# Patient Record
Sex: FEMALE | Race: BLACK OR AFRICAN AMERICAN | Employment: OTHER | URBAN - METROPOLITAN AREA
[De-identification: names, ages, dates, MRNs, and addresses within clinical notes are randomized per-mention and may not be internally consistent; named-entity substitution may affect disease eponyms.]

---

## 2017-06-30 ENCOUNTER — APPOINTMENT (OUTPATIENT)
Dept: CT IMAGING | Age: 82
DRG: 064 | End: 2017-06-30
Attending: STUDENT IN AN ORGANIZED HEALTH CARE EDUCATION/TRAINING PROGRAM
Payer: MEDICARE

## 2017-06-30 ENCOUNTER — APPOINTMENT (OUTPATIENT)
Dept: GENERAL RADIOLOGY | Age: 82
DRG: 064 | End: 2017-06-30
Attending: STUDENT IN AN ORGANIZED HEALTH CARE EDUCATION/TRAINING PROGRAM
Payer: MEDICARE

## 2017-06-30 ENCOUNTER — APPOINTMENT (OUTPATIENT)
Dept: MRI IMAGING | Age: 82
DRG: 064 | End: 2017-06-30
Attending: INTERNAL MEDICINE
Payer: MEDICARE

## 2017-06-30 ENCOUNTER — HOSPITAL ENCOUNTER (INPATIENT)
Age: 82
LOS: 11 days | Discharge: NURSING, REHAB OR LONG-TERM CARE | DRG: 064 | End: 2017-07-11
Attending: STUDENT IN AN ORGANIZED HEALTH CARE EDUCATION/TRAINING PROGRAM | Admitting: INTERNAL MEDICINE
Payer: MEDICARE

## 2017-06-30 DIAGNOSIS — R29.898 RIGHT LEG WEAKNESS: ICD-10-CM

## 2017-06-30 DIAGNOSIS — I63.312 CEREBROVASCULAR ACCIDENT (CVA) DUE TO THROMBOSIS OF LEFT MIDDLE CEREBRAL ARTERY (HCC): ICD-10-CM

## 2017-06-30 DIAGNOSIS — I63.9 CEREBROVASCULAR ACCIDENT (CVA), UNSPECIFIED MECHANISM (HCC): Primary | ICD-10-CM

## 2017-06-30 LAB
ALBUMIN SERPL BCP-MCNC: 3.6 G/DL (ref 3.5–5)
ALBUMIN/GLOB SERPL: 1 {RATIO} (ref 1.1–2.2)
ALP SERPL-CCNC: 87 U/L (ref 45–117)
ALT SERPL-CCNC: 62 U/L (ref 12–78)
ANION GAP BLD CALC-SCNC: 8 MMOL/L (ref 5–15)
APPEARANCE UR: CLEAR
APTT PPP: 25.5 SEC (ref 22.1–32.5)
AST SERPL W P-5'-P-CCNC: 76 U/L (ref 15–37)
ATRIAL RATE: 293 BPM
BACTERIA URNS QL MICRO: NEGATIVE /HPF
BASOPHILS # BLD AUTO: 0 K/UL (ref 0–0.1)
BASOPHILS # BLD: 0 % (ref 0–1)
BILIRUB SERPL-MCNC: 0.7 MG/DL (ref 0.2–1)
BILIRUB UR QL: NEGATIVE
BNP SERPL-MCNC: 236 PG/ML (ref 0–100)
BUN SERPL-MCNC: 11 MG/DL (ref 6–20)
BUN/CREAT SERPL: 9 (ref 12–20)
CALCIUM SERPL-MCNC: 9.1 MG/DL (ref 8.5–10.1)
CALCULATED R AXIS, ECG10: 73 DEGREES
CALCULATED T AXIS, ECG11: -57 DEGREES
CHLORIDE SERPL-SCNC: 108 MMOL/L (ref 97–108)
CO2 SERPL-SCNC: 29 MMOL/L (ref 21–32)
COLOR UR: ABNORMAL
CREAT SERPL-MCNC: 1.16 MG/DL (ref 0.55–1.02)
DIAGNOSIS, 93000: NORMAL
DIFFERENTIAL METHOD BLD: ABNORMAL
EOSINOPHIL # BLD: 1.5 K/UL (ref 0–0.4)
EOSINOPHIL NFR BLD: 2 % (ref 0–7)
EPITH CASTS URNS QL MICRO: ABNORMAL /LPF
ERYTHROCYTE [DISTWIDTH] IN BLOOD BY AUTOMATED COUNT: 15.4 % (ref 11.5–14.5)
GLOBULIN SER CALC-MCNC: 3.6 G/DL (ref 2–4)
GLUCOSE BLD STRIP.AUTO-MCNC: 92 MG/DL (ref 65–100)
GLUCOSE SERPL-MCNC: 92 MG/DL (ref 65–100)
GLUCOSE UR STRIP.AUTO-MCNC: NEGATIVE MG/DL
HCT VFR BLD AUTO: 35.7 % (ref 35–47)
HGB BLD-MCNC: 11.4 G/DL (ref 11.5–16)
HGB UR QL STRIP: ABNORMAL
HYALINE CASTS URNS QL MICRO: ABNORMAL /LPF (ref 0–5)
INR BLD: 0.9 (ref 0.9–1.2)
INR PPP: 1.1 (ref 0.9–1.1)
KETONES UR QL STRIP.AUTO: NEGATIVE MG/DL
LACTATE SERPL-SCNC: 0.8 MMOL/L (ref 0.4–2)
LEUKOCYTE ESTERASE UR QL STRIP.AUTO: NEGATIVE
LYMPHOCYTES # BLD AUTO: 94 % (ref 12–49)
LYMPHOCYTES # BLD: 70.7 K/UL (ref 0.8–3.5)
MCH RBC QN AUTO: 29.2 PG (ref 26–34)
MCHC RBC AUTO-ENTMCNC: 31.9 G/DL (ref 30–36.5)
MCV RBC AUTO: 91.5 FL (ref 80–99)
MONOCYTES # BLD: 0.8 K/UL (ref 0–1)
MONOCYTES NFR BLD AUTO: 1 % (ref 5–13)
NEUTS SEG # BLD: 2.3 K/UL (ref 1.8–8)
NEUTS SEG NFR BLD AUTO: 3 % (ref 32–75)
NITRITE UR QL STRIP.AUTO: NEGATIVE
PATH REV BLD -IMP: ABNORMAL
PH UR STRIP: 7.5 [PH] (ref 5–8)
PLATELET # BLD AUTO: 174 K/UL (ref 150–400)
PLATELET COMMENTS,PCOM: ABNORMAL
POTASSIUM SERPL-SCNC: 3.1 MMOL/L (ref 3.5–5.1)
PROT SERPL-MCNC: 7.2 G/DL (ref 6.4–8.2)
PROT UR STRIP-MCNC: NEGATIVE MG/DL
PROTHROMBIN TIME: 10.9 SEC (ref 9–11.1)
Q-T INTERVAL, ECG07: 328 MS
QRS DURATION, ECG06: 86 MS
QTC CALCULATION (BEZET), ECG08: 489 MS
RBC # BLD AUTO: 3.9 M/UL (ref 3.8–5.2)
RBC #/AREA URNS HPF: ABNORMAL /HPF (ref 0–5)
RBC MORPH BLD: ABNORMAL
SERVICE CMNT-IMP: NORMAL
SODIUM SERPL-SCNC: 145 MMOL/L (ref 136–145)
SP GR UR REFRACTOMETRY: 1.01 (ref 1–1.03)
THERAPEUTIC RANGE,PTTT: NORMAL SECS (ref 58–77)
TROPONIN I SERPL-MCNC: <0.04 NG/ML
TSH SERPL DL<=0.05 MIU/L-ACNC: 1.35 UIU/ML (ref 0.36–3.74)
UROBILINOGEN UR QL STRIP.AUTO: 0.2 EU/DL (ref 0.2–1)
VENTRICULAR RATE, ECG03: 134 BPM
WBC # BLD AUTO: 75.3 K/UL (ref 3.6–11)
WBC MORPH BLD: ABNORMAL
WBC URNS QL MICRO: ABNORMAL /HPF (ref 0–4)

## 2017-06-30 PROCEDURE — G8997 SWALLOW GOAL STATUS: HCPCS | Performed by: PHYSICAL MEDICINE & REHABILITATION

## 2017-06-30 PROCEDURE — 74011000258 HC RX REV CODE- 258: Performed by: STUDENT IN AN ORGANIZED HEALTH CARE EDUCATION/TRAINING PROGRAM

## 2017-06-30 PROCEDURE — 74011250636 HC RX REV CODE- 250/636: Performed by: STUDENT IN AN ORGANIZED HEALTH CARE EDUCATION/TRAINING PROGRAM

## 2017-06-30 PROCEDURE — G8998 SWALLOW D/C STATUS: HCPCS | Performed by: PHYSICAL MEDICINE & REHABILITATION

## 2017-06-30 PROCEDURE — 70450 CT HEAD/BRAIN W/O DYE: CPT

## 2017-06-30 PROCEDURE — 74011636320 HC RX REV CODE- 636/320: Performed by: STUDENT IN AN ORGANIZED HEALTH CARE EDUCATION/TRAINING PROGRAM

## 2017-06-30 PROCEDURE — 85610 PROTHROMBIN TIME: CPT | Performed by: STUDENT IN AN ORGANIZED HEALTH CARE EDUCATION/TRAINING PROGRAM

## 2017-06-30 PROCEDURE — 84484 ASSAY OF TROPONIN QUANT: CPT | Performed by: STUDENT IN AN ORGANIZED HEALTH CARE EDUCATION/TRAINING PROGRAM

## 2017-06-30 PROCEDURE — 70551 MRI BRAIN STEM W/O DYE: CPT

## 2017-06-30 PROCEDURE — 93306 TTE W/DOPPLER COMPLETE: CPT

## 2017-06-30 PROCEDURE — 70496 CT ANGIOGRAPHY HEAD: CPT

## 2017-06-30 PROCEDURE — 74011250637 HC RX REV CODE- 250/637: Performed by: INTERNAL MEDICINE

## 2017-06-30 PROCEDURE — 85730 THROMBOPLASTIN TIME PARTIAL: CPT | Performed by: STUDENT IN AN ORGANIZED HEALTH CARE EDUCATION/TRAINING PROGRAM

## 2017-06-30 PROCEDURE — 84443 ASSAY THYROID STIM HORMONE: CPT | Performed by: INTERNAL MEDICINE

## 2017-06-30 PROCEDURE — 65660000000 HC RM CCU STEPDOWN

## 2017-06-30 PROCEDURE — 93005 ELECTROCARDIOGRAM TRACING: CPT

## 2017-06-30 PROCEDURE — 77030011943

## 2017-06-30 PROCEDURE — 85025 COMPLETE CBC W/AUTO DIFF WBC: CPT | Performed by: STUDENT IN AN ORGANIZED HEALTH CARE EDUCATION/TRAINING PROGRAM

## 2017-06-30 PROCEDURE — 74011250636 HC RX REV CODE- 250/636: Performed by: INTERNAL MEDICINE

## 2017-06-30 PROCEDURE — 83880 ASSAY OF NATRIURETIC PEPTIDE: CPT | Performed by: STUDENT IN AN ORGANIZED HEALTH CARE EDUCATION/TRAINING PROGRAM

## 2017-06-30 PROCEDURE — G8996 SWALLOW CURRENT STATUS: HCPCS | Performed by: PHYSICAL MEDICINE & REHABILITATION

## 2017-06-30 PROCEDURE — 83605 ASSAY OF LACTIC ACID: CPT | Performed by: STUDENT IN AN ORGANIZED HEALTH CARE EDUCATION/TRAINING PROGRAM

## 2017-06-30 PROCEDURE — 92610 EVALUATE SWALLOWING FUNCTION: CPT | Performed by: PHYSICAL MEDICINE & REHABILITATION

## 2017-06-30 PROCEDURE — 99285 EMERGENCY DEPT VISIT HI MDM: CPT

## 2017-06-30 PROCEDURE — 80053 COMPREHEN METABOLIC PANEL: CPT | Performed by: STUDENT IN AN ORGANIZED HEALTH CARE EDUCATION/TRAINING PROGRAM

## 2017-06-30 PROCEDURE — 82962 GLUCOSE BLOOD TEST: CPT

## 2017-06-30 PROCEDURE — 71010 XR CHEST PORT: CPT

## 2017-06-30 PROCEDURE — 36415 COLL VENOUS BLD VENIPUNCTURE: CPT | Performed by: STUDENT IN AN ORGANIZED HEALTH CARE EDUCATION/TRAINING PROGRAM

## 2017-06-30 PROCEDURE — 74011250637 HC RX REV CODE- 250/637: Performed by: STUDENT IN AN ORGANIZED HEALTH CARE EDUCATION/TRAINING PROGRAM

## 2017-06-30 PROCEDURE — 81001 URINALYSIS AUTO W/SCOPE: CPT | Performed by: STUDENT IN AN ORGANIZED HEALTH CARE EDUCATION/TRAINING PROGRAM

## 2017-06-30 PROCEDURE — 85610 PROTHROMBIN TIME: CPT

## 2017-06-30 RX ORDER — POTASSIUM CHLORIDE 750 MG/1
10 TABLET, FILM COATED, EXTENDED RELEASE ORAL DAILY
COMMUNITY
End: 2017-07-11

## 2017-06-30 RX ORDER — ACETAMINOPHEN 650 MG/1
650 SUPPOSITORY RECTAL
Status: DISCONTINUED | OUTPATIENT
Start: 2017-06-30 | End: 2017-07-11 | Stop reason: HOSPADM

## 2017-06-30 RX ORDER — SODIUM CHLORIDE 0.9 % (FLUSH) 0.9 %
5-10 SYRINGE (ML) INJECTION AS NEEDED
Status: DISCONTINUED | OUTPATIENT
Start: 2017-06-30 | End: 2017-07-11 | Stop reason: HOSPADM

## 2017-06-30 RX ORDER — ATORVASTATIN CALCIUM 20 MG/1
20 TABLET, FILM COATED ORAL DAILY
Status: DISCONTINUED | OUTPATIENT
Start: 2017-06-30 | End: 2017-07-11 | Stop reason: HOSPADM

## 2017-06-30 RX ORDER — DIGOXIN 0.25 MG/ML
250 INJECTION INTRAMUSCULAR; INTRAVENOUS
Status: COMPLETED | OUTPATIENT
Start: 2017-06-30 | End: 2017-06-30

## 2017-06-30 RX ORDER — ASPIRIN 81 MG/1
81 TABLET ORAL DAILY
COMMUNITY
End: 2017-07-11

## 2017-06-30 RX ORDER — SODIUM CHLORIDE 0.9 % (FLUSH) 0.9 %
5-10 SYRINGE (ML) INJECTION EVERY 8 HOURS
Status: DISCONTINUED | OUTPATIENT
Start: 2017-06-30 | End: 2017-07-11 | Stop reason: HOSPADM

## 2017-06-30 RX ORDER — METOPROLOL TARTRATE 50 MG/1
50 TABLET ORAL
Status: COMPLETED | OUTPATIENT
Start: 2017-06-30 | End: 2017-06-30

## 2017-06-30 RX ORDER — CLOPIDOGREL BISULFATE 75 MG/1
75 TABLET ORAL DAILY
Status: DISCONTINUED | OUTPATIENT
Start: 2017-07-01 | End: 2017-07-01

## 2017-06-30 RX ORDER — HEPARIN SODIUM 5000 [USP'U]/ML
5000 INJECTION, SOLUTION INTRAVENOUS; SUBCUTANEOUS EVERY 8 HOURS
Status: DISCONTINUED | OUTPATIENT
Start: 2017-06-30 | End: 2017-07-11 | Stop reason: HOSPADM

## 2017-06-30 RX ORDER — SODIUM CHLORIDE 0.9 % (FLUSH) 0.9 %
10 SYRINGE (ML) INJECTION
Status: COMPLETED | OUTPATIENT
Start: 2017-06-30 | End: 2017-06-30

## 2017-06-30 RX ORDER — HYDRALAZINE HYDROCHLORIDE 10 MG/1
10 TABLET, FILM COATED ORAL 2 TIMES DAILY
Status: DISCONTINUED | OUTPATIENT
Start: 2017-07-01 | End: 2017-07-01

## 2017-06-30 RX ORDER — DIGOXIN 125 MCG
0.12 TABLET ORAL DAILY
Status: DISCONTINUED | OUTPATIENT
Start: 2017-07-01 | End: 2017-07-03

## 2017-06-30 RX ORDER — SODIUM CHLORIDE 9 MG/ML
75 INJECTION, SOLUTION INTRAVENOUS CONTINUOUS
Status: DISPENSED | OUTPATIENT
Start: 2017-06-30 | End: 2017-07-01

## 2017-06-30 RX ORDER — ASPIRIN 325 MG
325 TABLET ORAL ONCE
Status: COMPLETED | OUTPATIENT
Start: 2017-06-30 | End: 2017-06-30

## 2017-06-30 RX ORDER — ASPIRIN 325 MG
325 TABLET ORAL DAILY
Status: DISCONTINUED | OUTPATIENT
Start: 2017-07-01 | End: 2017-06-30

## 2017-06-30 RX ORDER — ACETAMINOPHEN 325 MG/1
650 TABLET ORAL
Status: DISCONTINUED | OUTPATIENT
Start: 2017-06-30 | End: 2017-07-11 | Stop reason: HOSPADM

## 2017-06-30 RX ORDER — SODIUM CHLORIDE 9 MG/ML
500 INJECTION, SOLUTION INTRAVENOUS ONCE
Status: DISCONTINUED | OUTPATIENT
Start: 2017-06-30 | End: 2017-06-30 | Stop reason: SDUPTHER

## 2017-06-30 RX ORDER — METOPROLOL SUCCINATE 50 MG/1
50 TABLET, EXTENDED RELEASE ORAL DAILY
COMMUNITY
End: 2017-10-13

## 2017-06-30 RX ORDER — LORAZEPAM 2 MG/ML
2 INJECTION INTRAMUSCULAR ONCE
Status: COMPLETED | OUTPATIENT
Start: 2017-06-30 | End: 2017-06-30

## 2017-06-30 RX ADMIN — DIGOXIN 250 MCG: 0.25 INJECTION INTRAMUSCULAR; INTRAVENOUS at 22:43

## 2017-06-30 RX ADMIN — Medication 10 ML: at 13:07

## 2017-06-30 RX ADMIN — SODIUM CHLORIDE 100 ML: 900 INJECTION, SOLUTION INTRAVENOUS at 07:16

## 2017-06-30 RX ADMIN — METOPROLOL TARTRATE 50 MG: 50 TABLET ORAL at 08:02

## 2017-06-30 RX ADMIN — IOPAMIDOL 100 ML: 755 INJECTION, SOLUTION INTRAVENOUS at 07:16

## 2017-06-30 RX ADMIN — SODIUM CHLORIDE 500 ML: 900 INJECTION, SOLUTION INTRAVENOUS at 12:31

## 2017-06-30 RX ADMIN — HEPARIN SODIUM 5000 UNITS: 5000 INJECTION, SOLUTION INTRAVENOUS; SUBCUTANEOUS at 12:21

## 2017-06-30 RX ADMIN — HEPARIN SODIUM 5000 UNITS: 5000 INJECTION, SOLUTION INTRAVENOUS; SUBCUTANEOUS at 17:58

## 2017-06-30 RX ADMIN — ATORVASTATIN CALCIUM 20 MG: 20 TABLET, FILM COATED ORAL at 12:21

## 2017-06-30 RX ADMIN — ASPIRIN 325 MG: 325 TABLET ORAL at 08:03

## 2017-06-30 RX ADMIN — LORAZEPAM 2 MG: 2 INJECTION INTRAMUSCULAR; INTRAVENOUS at 20:48

## 2017-06-30 RX ADMIN — Medication 10 ML: at 07:16

## 2017-06-30 RX ADMIN — Medication 10 ML: at 20:48

## 2017-06-30 RX ADMIN — SODIUM CHLORIDE 75 ML/HR: 900 INJECTION, SOLUTION INTRAVENOUS at 18:18

## 2017-06-30 RX ADMIN — SODIUM CHLORIDE 500 ML: 900 INJECTION, SOLUTION INTRAVENOUS at 09:16

## 2017-06-30 RX ADMIN — ACETAMINOPHEN 650 MG: 325 TABLET, FILM COATED ORAL at 18:18

## 2017-06-30 NOTE — PROGRESS NOTES
Occupational Therapy    Order received, chart reviewed. Initially cleared by RN to see pt, she reports though pt HR will spike up to 155 bpm. Medical team is aware and pt is cleared to move as long as that rate is not sustained. Pts BP taken and found to be 150/110 and 162/105. At this time ECHO arrived to complete bedside testing. Spoke with RN who report pt does not have anything on board for BP and will notify medical team. Will defer, follow up as able and appropriate.  Irina Aguilar, OTR/L

## 2017-06-30 NOTE — H&P
[facility] [place] [address]   HISTORY AND PHYSICAL       Name:  Leilani Dejesus   MR#:  [id number]   :  10/20/1932   Account #:  [de-identified]        Date of Adm:  2017       CHIEF COMPLAINT: Right lower extremity weakness. HISTORY OF PRESENT ILLNESS: The patient is an 70-year-old   [facility] [employer] female with past medical history of hypertension,   hypercholesterolemia, chronic lymphocytic leukemia, prior history of   atrial fibrillation, currently in sinus rhythm and not on any   anticoagulation, as well as previous history of stroke, who comes to the   emergency room complaining of right lower extremity weakness x2   days. The patient reports that 2 days ago, she started having some   difficulties with walking because because her right leg felt weak. She   denies any falls. She denies leaning towards that side more than the   other. She denies any upper extremity weakness. She denies any   swelling of the lower extremities. She denies any difficulties with   swallowing or any speech difficulties. The patient's granddaughter and   niece are at the bedside and confirm that there was no obvious facial   droop, drooling, slurred speech or confusion. Patient denies any   recent hospitalization or any recent illness. No fevers. No chest pain. She has been taking all of her medications as prescribed. She used to   have a history of atrial fibrillation and was on Coumadin and followed   that with Dr. Emma Stapleton, but that has normalized to sinus rhythm and   the patient was taken off of her blood thinners about 2 years ago. The patient also complains of some minimal pain in her right lower   abdominal quadrant. She states she has been having some problems   with constipation lately and has had poor appetite, but denies any   change in her bowel habits. There are no other complaints. PAST MEDICAL HISTORY    1. Chronic lymphocytic leukemia.     2. Hypertension. 3. Hypercholesterolemia. 4. Allergic rhinitis. 5. Prior history of atrial fibrillation in 2014. MEDICATIONS ON ADMISSION    1. Metoprolol succinate 50 mg p.o. daily. 2. Aspirin 81 mg p.o. daily. 3. Potassium chloride 10 mEq p.o. daily. 4. Furosemide 40 mg p.o. daily. 5. Atorvastatin 20 mg p.o. daily. ALLERGIES     1. ACE INHIBITORS CAUSES COUGH. 2. AMLODIPINE CAUSES SWELLING. 3. ATENOLOL CAUSES NAUSEA. 4. CHLORTHALIDONE. 5. FELODIPINE. 6. HYZAAR CAUSES HEADACHE. SOCIAL HISTORY: The patient is . She is a former smoker   and used to smoke a 1/4-pack of cigarettes per day for about 50 years. She quit in 2010. She occasionally drinks alcohol, 2-4 drinks per week. She normally lives independently with family care as needed. FAMILY HISTORY: Hypertension in her sister. Daughter with breast   cancer. No family history of coronary artery disease, premature cardiac   death or diabetes. REVIEW OF SYSTEMS   All pertinent findings were noted in the HPI, otherwise a 10-point   review of systems is negative. PHYSICAL EXAMINATION   GENERAL:  The patient is a frail, elderly female in no acute respiratory   distress. VITAL SIGNS: Blood pressure 159/117, pulse 138, temperature 98.2,   respiratory rate 18, O2 saturation 95% on room air. BMI is 21. NEUROLOGIC: The patient is awake, alert and oriented x3,   cooperates with interview and physical examination. She answers all   questions appropriately. She has some trouble remembering the past   history, for instance, she did not know that she had a stroke in the   past, but her niece and granddaughter confirms. Normocephalic and   atraumatic. Extraocular movements are grossly intact. No scleral   icterus. The patient's cranial nerves 2-12 are grossly intact. There is no   facial droop. No drooling. Speech is intact. Throat and oral mucosa are   moist.   NECK: Supple. No JVD or lymphadenopathy.    LUNGS: Clear to auscultation bilaterally anteriorly and posteriorly. No   rales, rhonchi or wheezing. CARDIOVASCULAR: Reveals a regular rhythm and rate. S1, S2. Tachycardia. ABDOMEN: Soft, minimally tender localized in the right lower   quadrant. No rebound tenderness. The abdomen is nondistended. EXTREMITIES: Reveal no peripheral edema. There is a right foot drop   and strength in the right lower extremity is about 2-3/5 and weaker   than the left. Strength is 5/5 in the left lower extremity.  in both   hands is symmetrical and normal.   SKIN: Warm, dry and reveals no rashes. DIAGNOSTIC FINDINGS: CBC shows a white blood cell count of 75.3. Her last white blood cell count back in 2014 was 53.6. Hemoglobin is   11.4, hematocrit of 35.7, platelets 396 with 05% lymphocytes. CHEMISTRY: Sodium 145, potassium 3.1, chloride 108, CO2 29, BUN   11, creatinine 1.16, calcium 9.1. Liver function tests within normal   range. Troponin is less than 0.04. BNP is 236. Urinalysis shows clear yellow urine. No bacteria. INR is 1.1. Chest x-ray shows no acute cardiopulmonary abnormality, mild   cardiomegaly. No consolidation or congestion. CT scan of the head shows no acute intracranial hemorrhage,   progressive microvascular ischemic disease in the periventricular white   matter. Stable chronic posterior right frontal infarct, age indeterminate,   but likely chronic right cerebellar infarct. CTA of head and neck shows left middle cerebral artery bifurcation   atherosclerosis with at least mild stenosis of M2. No hemodynamically   significant stenosis, possible acute left posterior basal ganglia/internal   capsule lacunar infarct, bilateral neck adenopathy. EKG show atrial flutter with variable AV block. Heart rate was 134. ASSESSMENT/PLAN    1. Acute cerebrovascular accident resulting in right lower extremity   weakness. The patient will be admitted to the neurology floor.  I will   continue the aspirin, increase the dose to 325 mg. Continue statin at   current dose, considering patient's age. She is not a candidate for high-  intensity statin. I will request Physical Therapy and Occupational   Therapy consults. Permissive hypertension. Neurology consult for   additional recommendations. 2. Hypertension. At this point, the patient's blood pressure is within   acceptable range in the setting of acute cerebrovascular accident. Continue to monitor closely. 3. Tachycardia. The patient's heart rate is markedly elevated. I have   administered a bolus of 500 mL of normal saline in the emergency   room. Continue to evaluate. The patient will be admitted to telemetry. I   will request Cardiology consult by Dr. Coleen Londono. Of note, the patient   received her a.m. dose of metoprolol this morning in the emergency   room, which transiently dropped her blood pressure and transiently   worsened her neurological deficits. Caution with any rate controlling   medications, therefore, is warranted. 4. Chronic lymphocytic leukemia with a markedly elevated white blood   cell count. I reviewed the chart and this is higher than her baseline. The patient may need a Hematology/Oncology consult. In   the meantime, continue to hydrate the patient with normal saline. 5. Minimal right lower quadrant abdominal pain, most likely due to   constipation. I will place the patient on stool softeners. 6. Deep vein thrombosis prophylaxis with heparin subcutaneously. On behalf of the hospitalist team, thank you for involving us in the care   of this patient.         MD Radha Ayers / TRISTON   D:  06/30/2017   09:43   T:  06/30/2017   18:01   Job #:  [id number]

## 2017-06-30 NOTE — PROGRESS NOTES
Referral received. Reviewed chart and met with pt. Pt states she lives with her niece Rachel De Anda) who works during the day. She also stated she walks to [facility] everyday for lunch, etc. She confirmed her PCP being Dr. Felicita Mckeon and informs me she has an appointment with him on July 17th but could not remember time. With her permission, I contacted listed next of kin Isabella Hardwick [phone]). She confirmed the above information as being correct. Stated pt still drives her car as well. Discussed current situation with need for PT/OT evaluations. With previous level of functioning, it is hopeful she may meet inpt rehab criteria if needed. Reviewed inpt rehab facilities. If recommended, they would like Corey Martins to be consulted. Pt does not have Advanced Medical Directive but Ms. Criselda Iqbal feels if able it would be beneficial for pt to complete. Ms Criselda Iqbal expressed desire to be the designated appointee. Financial situation notview discussed at this interview. CRM to follow and assist with disposition plan. Kirk Qiu LCSW    Care Management Interventions  PCP Verified by CM: Yes (Dr. Javier Vaca)  Mode of Transport at Discharge: Other (see comment)  Transition of Care Consult (CM Consult):  Other (? inpt rehab)  MyChart Signup: No  Discharge Durable Medical Equipment: No  Physical Therapy Consult: Yes  Occupational Therapy Consult: Yes  Speech Therapy Consult: Yes  Current Support Network: Relative's Home (lives with es De Anda))  Confirm Follow Up Transport: Family  Plan discussed with Pt/Family/Caregiver: Yes  Freedom of Choice Offered: Yes  Discharge Location  Discharge Placement: Rehab hospital/unit acute

## 2017-06-30 NOTE — H&P
History & Physical dictated [id number]    Acute CVA  HTN  ? Atrial Flutter vs sinus tachy      Signed By: Kieran Zheng MD     June 30, 2017

## 2017-06-30 NOTE — PROGRESS NOTES
Primary Nurse Vianey Black and Carisa Polanco, DONALD performed a dual skin assessment on this patient No impairment noted  Geremias score is 15

## 2017-06-30 NOTE — ED NOTES
Gave bedside report regarding, SBAR, MAR, and plan of care to [facility]. Transfered care of patient to RN.

## 2017-06-30 NOTE — CONSULTS
NEUROLOGY  6/30/2017     Consulted by: Red Jacobo MD        Patient ID:  Hallie Pedroza  [id number]  80 y.o.  10/20/1932    Cc: Slurred speech and weakness    HPI    Annia Gunn is a 80-year-old woman who lives with her niece who fell in her bathroom early this morning. She was found on the bathroom floor unable to get up and had weakness of the right leg. She also had slurred speech. Symptoms apparently resolved by the time she got to the hospital.  While in the hospital, symptoms returned and worsened. Her last known normal was late last night. Here in the hospital, she tells me she cannot move her right leg. She denies any problem with vision or mentation. She is very tired because she has not slept. I reviewed the head CT and there is evidence to suggest a left MCA infarct. CTA with atherosclerosis more on the left. According to the patient in the medical record she has been taking aspirin 81 mg daily. Review of Systems   Eyes: Negative for double vision. Musculoskeletal: Positive for falls. Neurological: Positive for speech change and focal weakness. Negative for headaches. All other systems reviewed and are negative. Past Medical History:   Diagnosis Date    A-fib [facility]) 10/31/2014    Allergic rhinitis 1/13/2010    CLL (chronic lymphocytic leukemia) ([address]     HTN (hypertension) 1/13/2010    Hypercholesterolemia      No family history on file. Social History     Social History    Marital status:      Spouse name: N/A    Number of children: N/A    Years of education: N/A     Occupational History    Not on file.      Social History Main Topics    Smoking status: Former Smoker     Packs/day: 0.25     Years: 50.00     Types: Cigarettes     Quit date: 4/1/2010    Smokeless tobacco: Not on file    Alcohol use 1.0 - 2.0 oz/week     2 - 4 drink(s) per week      Comment: recently quit Friday gin    Drug use: No    Sexual activity: Not on file     Other Topics Concern    Not on file     Social History Narrative     Current Facility-Administered Medications   Medication Dose Route Frequency    sodium chloride (NS) flush 5-10 mL  5-10 mL IntraVENous Q8H    sodium chloride (NS) flush 5-10 mL  5-10 mL IntraVENous PRN    acetaminophen (TYLENOL) tablet 650 mg  650 mg Oral Q4H PRN    Or    acetaminophen (TYLENOL) solution 650 mg  650 mg Per NG tube Q4H PRN    Or    acetaminophen (TYLENOL) suppository 650 mg  650 mg Rectal Q4H PRN    [START ON 7/1/2017] aspirin (ASPIRIN) tablet 325 mg  325 mg Oral DAILY    heparin (porcine) injection 5,000 Units  5,000 Units SubCUTAneous Q8H    atorvastatin (LIPITOR) tablet 20 mg  20 mg Oral DAILY    0.9% sodium chloride infusion  75 mL/hr IntraVENous CONTINUOUS     Allergies   Allergen Reactions    Ace Inhibitors Cough    Amlodipine Swelling    Atenolol Nausea Only    Chlorthalidone Other (comments)     Urinary incontinence    Felodipine Swelling    Hyzaar [Losartan-Hydrochlorothiazide] Other (comments)     headache       Visit Vitals    /89 (BP 1 Location: Right arm, BP Patient Position: At rest;Supine)    Pulse 88    Temp 98.7 °F (37.1 °C)    Resp 19    Ht 5' 5\" (1.651 m)  Comment: drivers license    Wt 56.9 kg (129 lb 13.6 oz)    SpO2 100%    Breastfeeding No    BMI 21.61 kg/m2     Physical Exam   Constitutional: She appears well-developed. Very thin elderly woman   Eyes: EOM are normal. Pupils are equal, round, and reactive to light. Cardiovascular: Normal rate. Pulmonary/Chest: Effort normal.   Skin: Skin is warm and dry. Psychiatric: Her speech is slurred. Vitals reviewed. Neurologic Exam     Mental Status   Attention: normal.   Speech: slurred   Level of consciousness: alert ,  drowsy    Cranial Nerves     CN III, IV, VI   Pupils are equal, round, and reactive to light.   Extraocular motions are normal.     CN VII   Right facial weakness: central    CN VIII   Hearing: intact    CN XII   Tongue deviation: none       Right ptosis     Motor Exam   Muscle bulk: decreased  Overall muscle tone: normal  Right arm pronator drift: present       Right leg 1/5     Sensory Exam        Grossly intact to touch     Gait, Coordination, and Reflexes     Gait  Gait: (Deferredweakness)           Lab Results  Component Value Date/Time   WBC 75.3 06/30/2017 06:54 AM   HGB 11.4 06/30/2017 06:54 AM   HCT 35.7 06/30/2017 06:54 AM   PLATELET 745 10/91/0766 06:54 AM   MCV 91.5 06/30/2017 06:54 AM     Lab Results  Component Value Date/Time   Glucose 92 06/30/2017 06:54 AM   Glucose (POC) 92 06/30/2017 06:49 AM   LDL, calculated 117.2 10/25/2014 05:04 AM   Creatinine 1.16 06/30/2017 06:54 AM      Lab Results  Component Value Date/Time   Cholesterol, total 177 10/25/2014 05:04 AM   HDL Cholesterol 49 10/25/2014 05:04 AM   LDL, calculated 117.2 10/25/2014 05:04 AM   Triglyceride 54 10/25/2014 05:04 AM   CHOL/HDL Ratio 3.6 10/25/2014 05:04 AM   Lab Results  Component Value Date/Time   ALT (SGPT) 62 06/30/2017 06:54 AM   AST (SGOT) 76 06/30/2017 06:54 AM   Alk. phosphatase 87 06/30/2017 06:54 AM   Bilirubin, direct 0.1 09/30/2009 12:45 PM   Bilirubin, total 0.7 06/30/2017 06:54 AM   Albumin 3.6 06/30/2017 06:54 AM   Protein, total 7.2 06/30/2017 06:54 AM   INR 1.1 06/30/2017 06:54 AM   Prothrombin time 10.9 06/30/2017 06:54 AM   PLATELET 173 58/17/8426 06:54 AM       Lab Results  Component Value Date/Time   TSH 0.49 10/24/2014 08:33 PM                     CT Results (maximum last 3): Results from [place] encounter on 06/30/17   CTA CODE NEURO HEAD AND NECK W CONT   Narrative EXAM:  CTA CODE NEURO HEAD AND NECK W CONT  INDICATION:  R sided weakness, AMS, patient arrived at the emergency department  via emergency medical service with patient found on floor at 0 5:30 AM with  slurred speech and right arm weakness. Right facial weakness.   TECHNIQUE:   Axial spiral acquired CT angiography was performed from the aortic arch up  through the intracranial vessels. This was performed during intravenous bolus  infusion 100 mL of Isovue 370. Post-processing with  MIP reconstructions as well  as 3 D surface shaded reconstructions  from aortic arch to the skull base. CT  dose reduction was achieved through use of a standardized protocol tailored for  this examination and automatic exposure control for dose modulation. COMPARISON: CT head 6/30/17 and 10/25/14  FINDINGS:  Normal origins of the brachiocephalic arteries from the arch. The right vertebral artery is dominant. Artifact at the origin of the right  vertebral artery which does not demonstrate definitive stenosis. The left  vertebral artery is quite small/hypoplastic and there is a possibility of a  stenosis at its origin. The left vertebral artery ends in a small PICA. The right vertebral artery  supplies the basilar artery. The basilar artery is relatively small on a  developmental basis with primarily fetal origins to both posterior cerebral  arteries. The common carotid arteries are relatively unremarkable up to the bifurcations. There is dense atherosclerotic calcification at both carotid bifurcations. This  is not resulted in a hemodynamically significant stenosis. 0% stenosis by NASCET  criteria. The cervical internal carotid arteries are somewhat tortuous and otherwise  unremarkable. Carotid siphons have atherosclerotic calcification without significant stenosis. Mild irregularity of the distal supraclinoid internal carotid arteries without  significant stenosis. Right middle cerebral artery and branches are unremarkable. Left middle cerebral artery has a mild to perhaps moderate stenosis at the  origin of both anterior and posterior branches. Definite intraluminal filling  defect is not seen within the M1 or proximal into branches. Anterior cerebral arteries are relatively symmetric and unremarkable.   CT brain images demonstrate area of old injury/infarction in the right frontal  operculum and an area in the left parasagittal parietal lobe as well as evidence  of chronic small vessel type ischemic change. Possible new small lacunar infarct in the left posterior basal ganglia/internal  capsule since 2014. There are numerous lymph nodes in the neck and clavicular regions. Impression IMPRESSION:  1. Left middle cerebral artery bifurcation atherosclerosis with at least mild  stenosis of M2.  2. No hemodynamically significant stenosis or intraluminal filling defects. 3. Carotid bifurcation atherosclerotic calcification without hemodynamically  significant stenosis. 4. Possible acute left posterior basal ganglia/internal capsule lacunar infarct. 5. Bilateral neck adenopathy. Correlate clinically. 6. Chronic cervical spondylosis. CT CODE NEURO HEAD WO CONTRAST   Narrative EXAM:  CT code neuro head without contrast    INDICATION: Patient found on floor with slurred speech and right arm weakness. COMPARISON: CT head 10/25/2014. TECHNIQUE: Axial noncontrast head CT from foramen magnum to vertex. Coronal and  sagittal reformatted images were obtained. CT dose reduction was achieved  through use of a standardized protocol tailored for this examination and  automatic exposure control for dose modulation. Adaptive statistical iterative  reconstruction (ASIR) was utilized. FINDINGS:  There is diffuse age-related parenchymal volume loss. The ventricles  and sulci are age-appropriate without hydrocephalus. There is no mass effect or  midline shift. There is no intracranial hemorrhage or extra-axial fluid  collection. Areas of low attenuation in the periventricular white matter  represent progressive microvascular ischemic changes. There is a stable chronic  infarct in the posterior right frontal lobe. There is an age indeterminate but  likely chronic infarct in the right cerebellar hemisphere. The basal cisterns  are patent. The osseous structures are intact.  There is a mucus retention cyst in the left  maxillary sinus. The remaining paranasal sinuses and mastoid air cells are  clear. Impression IMPRESSION:   There is no acute intracranial hemorrhage. Progressive microvascular ischemic  disease in the periventricular white matter. Stable chronic posterior right  frontal infarct. Age indeterminate but likely chronic right cerebellar infarct. MRI Results (maximum last 3): No results found for this or any previous visit. VAS/US/Carotid Doppler Results (maximum last 3): Results from [place] encounter on 10/24/14   DUPLEX LOWER EXT VENOUS BILAT    PET Results (maximum last 3): No results found for this or any previous visit. Assessment and Plan        80year-old woman with right hemiparesis affecting mostly the leg. She has some right ptosis as well. She has atherosclerosis on imaging which is likely the source leading to thrombosis. Recommend complete the stroke workup. MRI brain noncontrast study. Do not need MRA. Change aspirin to Plavix 75 mg monotherapy. Check lipid panel to assess need for statin therapy. Echo. Rehab assessment. Any acute change in exam please obtain a stat noncontrast head CT. We will follow peripherally. Please call with any questions. During this evaluation, we also discussed stroke education to include signs and symptoms of stroke and TIA.        Anika Denis DO  NEUROLOGIST  Diplomate ABPN  6/30/2017

## 2017-06-30 NOTE — PROGRESS NOTES
TRANSFER - IN REPORT:    Verbal report received from DONALD Cagle(name) on Genesis Deng  being received from ED(unit) for routine progression of care      Report consisted of patients Situation, Background, Assessment and   Recommendations(SBAR). Information from the following report(s) SBAR, Kardex, ED Summary, Intake/Output, MAR, Recent Results and Cardiac Rhythm AFib/Flutter was reviewed with the receiving nurse. Opportunity for questions and clarification was provided. Assessment completed upon patients arrival to unit and care assumed.

## 2017-06-30 NOTE — ED PROVIDER NOTES
HPI Comments: 80 y.o. female with past medical history significant for chronic lymphocytic leukemia, a-fib, HTN, hypercholesterolemia, and allergic rhinitis who presents via EMS from home accompanied by her niece and granddaughter with chief complaint of slurred speech. Per pt's niece, pt fell between [id number] [phone] and 0630 in the bathroom where the niece found her laying on the floor. Pt's niece attempted to help the pt get up, but pt was unable to move her right leg. Pt was not speaking, except to say \"I slipped,\" which pt's niece states is abnormal. Pt was normal before going to bed last night at 2000. Pt's cardiologist discontinued her heart medications with the exception of Toprol-XL. Pt does not remember the fall, but remembers going to the bathroom. Per EMS, pt's initial symptoms of slurred speech and right-sided weakness had resolved. Pt was A&Ox4 with equal strength and no complaints on arrival. Pt's BP was 170/120, BS 94 and slightly tachycardic en route. Pt specifically denies pain. There are no other acute medical concerns at this time. Full history, physical exam, and ROS unable to be obtained due to:  History somewhat limited due to patient's condition and complaint. Social hx: former tobacco smoker; uses EtOH occasionally; denies illicit drug use  PCP: Rik Shen MD  Cardiology: Jerome Griffin MD    Note written by Olman Bloom, as dictated by Lauryn Del Valle MD 6:48 AM         The history is provided by the patient and a relative. Past Medical History:   Diagnosis Date    A-fib [facility]) 10/31/2014    Allergic rhinitis 1/13/2010    CLL (chronic lymphocytic [facility]) ([facility]     HTN (hypertension) 1/13/2010    Hypercholesterolemia        No past surgical history on file. No family history on file.     Social History     Social History    Marital status:      Spouse name: N/A    Number of children: N/A    Years of education: N/A     Occupational History    Not on file.     Social History Main Topics    Smoking status: Former Smoker     Packs/day: 0.25     Years: 50.00     Types: Cigarettes     Quit date: 4/1/2010    Smokeless tobacco: Not on file    Alcohol use 1.0 - 2.0 oz/week     2 - 4 drink(s) per week      Comment: recently quit Friday gin    Drug use: No    Sexual activity: Not on file     Other Topics Concern    Not on file     Social History Narrative         ALLERGIES: Ace inhibitors; Amlodipine; Atenolol; Chlorthalidone; Felodipine; and Hyzaar [losartan-hydrochlorothiazide]    Review of Systems   Unable to perform ROS: Acuity of condition       There were no vitals filed for this visit. Physical Exam   Constitutional: She is oriented to person, place, and time. She appears well-developed. No distress. HENT:   Head: Normocephalic and atraumatic. Eyes: Conjunctivae and EOM are normal. Pupils are equal, round, and reactive to light. Neck: Normal range of motion. Neck supple. Cardiovascular: Normal heart sounds. A regularly irregular rhythm present. Tachycardia present. No murmur heard. Pulmonary/Chest: Effort normal and breath sounds normal. No respiratory distress. Abdominal: Soft. Bowel sounds are normal. She exhibits no distension. There is no tenderness. There is no rebound. Musculoskeletal: Normal range of motion. She exhibits no edema. Neurological: She is alert and oriented to person, place, and time. No cranial nerve deficit. She exhibits normal muscle tone. Coordination normal.   Nonfocal   Skin: Skin is warm and dry. No rash noted. Psychiatric: She has a normal mood and affect. Her behavior is normal.   Nursing note and vitals reviewed.   Note written by Olman Cook, as dictated by Lamine Up MD 6:48 AM     MDM  Number of Diagnoses or Management Options  Cerebrovascular accident (CVA), unspecified mechanism [facility]): new and requires workup     Amount and/or Complexity of Data Reviewed  Clinical lab tests: ordered and reviewed  Tests in the radiology section of CPT®: ordered and reviewed  Tests in the medicine section of CPT®: ordered and reviewed  Decide to obtain previous medical records or to obtain history from someone other than the patient: yes  Obtain history from someone other than the patient: yes  Review and summarize past medical records: yes  Discuss the patient with other providers: yes  Independent visualization of images, tracings, or specimens: yes    Risk of Complications, Morbidity, and/or Mortality  Presenting problems: high  Diagnostic procedures: high  Management options: high    Critical Care  Total time providing critical care: 30-74 minutes (Total critical care time spend exclusive of procedures:  40 minutes  )    ED Course       Procedures    CONSULT NOTE:  7:02 AM Hamilton Stanley MD spoke with Dr. Anival Somers, Consult for Tele-Neurology. Discussed available diagnostic tests and clinical findings. She is in agreement with care plans as outlined. Dr. Anival Somers recommends ordering CTA of the head. ED EKG interpretation:  Rhythm: atrial flutter and variable AV block. Rate (approx.): 134; Axis: normal; ST/T wave: non-specific changes. Note written by Olman Bergman, as dictated by Hamilton Stanley MD 7:30 AM    8:12 AM  CTA head and neck: Impression:   1. Possible acute left posterior basal ganglia/internal capsule lacunar infarct. 2. Left middle cerebral artery bifurcation atherosclerosis with at least mild stenosis of M2.   3. No hemodynamically significant stenosis or intraluminal filling defects. 4. Carotid bifurcation atherosclerotic calcification without hemodynamically significant stenosis. 5. Bilateral neck adenopathy. Correlate clinically. 6. Chronic cervical spondylosis. Chest x-ray: Impression: No acute cardiopulmonary abnormality demonstrated    CT head and neck: Impression:  There is no acute intracranial hemorrhage.  Progressive microvascular ischemic disease in the periventricular white matter. Stable chronic posterior right frontal infarct. Age indeterminate but likely chronic right cerebellar infarct. CONSULT NOTE:  8:18 AM Eron Donovan MD spoke with Dr. Timi Marroquin, Consult for Tele-Neurology. Discussed available diagnostic tests and clinical findings on CTA. He is in agreement with care plans as outlined. Dr. Timi Marroquin recommends admitting the patient for further evaluation. Pt is not a TPA candidate. CONSULT NOTE:  8:37 AM Eron Donovan MD spoke with Sanjeev Benavidez MD, Consult for Hospitalist.  Discussed available diagnostic tests and clinical findings. She is in agreement with care plans as outlined. Dr. Jolynn Jay will evaluate and admit the patient. 9:03 AM  Reassessed patient. Pt complaining of feeling \"terrible\". Pt reports 3 days of right flank pain. Pt's /81. Took metoprolol and asa without complications. Unable to lift right upper or right lower extremity. Pt denies change in stools. Will consult tele-neurology. 9:05 AM  Dr. Jolynn Jay at bedside for initial evaluation. CONSULT NOTE:  9:18 AM Eron Donovan MD spoke with Dr. Timi Marroquin, Consult for Tele-Neurology. Discussed available diagnostic tests and clinical findings. He is in agreement with care plans as outlined. Dr. Timi Marroquin will evaluate the patient in the ED via tele-neuro.

## 2017-06-30 NOTE — ED NOTES
Bedside shift change report given to Cherlynn Closs, RN (oncoming nurse) by Griffin Schwarz RN (offgoing nurse). Report included the following information SBAR.

## 2017-06-30 NOTE — ROUTINE PROCESS
TRANSFER - OUT REPORT:    Verbal report given to Lion Vazquez RN(name) on Estrella  being transferred to [facility](unit) for routine progression of care       Report consisted of patients Situation, Background, Assessment and   Recommendations(SBAR). Information from the following report(s) SBAR was reviewed with the receiving nurse. Lines:   Peripheral IV 06/30/17 Left (Active)   Site Assessment Clean, dry, & intact 6/30/2017  7:16 AM   Phlebitis Assessment 0 6/30/2017  7:16 AM   Infiltration Assessment 0 6/30/2017  7:16 AM   Dressing Status Clean, dry, & intact 6/30/2017  7:16 AM       Peripheral IV 06/30/17 Right Antecubital (Active)   Site Assessment Clean, dry, & intact 6/30/2017  7:16 AM   Phlebitis Assessment 0 6/30/2017  7:16 AM   Infiltration Assessment 0 6/30/2017  7:16 AM   Dressing Status Clean, dry, & intact 6/30/2017  7:16 AM        Opportunity for questions and clarification was provided.       Patient transported with:   Monitor

## 2017-06-30 NOTE — PROGRESS NOTES
Admission Medication Reconciliation:    Information obtained from: patient    Significant PMH/Disease States:   Past Medical History:   Diagnosis Date    A-fib ([facility] 10/31/2014    Allergic rhinitis 1/13/2010    CLL (chronic lymphocytic leukemia) ([facility]     HTN (hypertension) 1/13/2010    Hypercholesterolemia        Chief Complaint for this Admission:  Fall, possible CVA    Allergies:  Ace inhibitors; Amlodipine; Atenolol; Chlorthalidone; Felodipine; and Hyzaar [losartan-hydrochlorothiazide]    Prior to Admission Medications:   Prior to Admission Medications   Prescriptions Last Dose Informant Patient Reported? Taking?   aspirin delayed-release 81 mg tablet 6/29/2017 at Unknown time  Yes Yes   Sig: Take 81 mg by mouth daily. atorvastatin (LIPITOR) 20 mg tablet 6/29/2017 at Unknown time  Yes Yes   Sig: Take 20 mg by mouth daily. furosemide (LASIX) 40 mg tablet 6/29/2017 at Unknown time  Yes Yes   Sig: Take 40 mg by mouth daily. metoprolol succinate (TOPROL-XL) 50 mg XL tablet 6/29/2017 at Unknown time  Yes Yes   Sig: Take 50 mg by mouth daily. potassium chloride SR (KLOR-CON 10) 10 mEq tablet 6/29/2017 at Unknown time  Yes Yes   Sig: Take 10 mEq by mouth daily. Facility-Administered Medications: None         Comments/Recommendations: Pt brought pill bottles. Pt cannot provide reliable allergy information.     Added:   ASA DR 81 mg PO every day  KLOR-CON 10 mEq PO every day    Removed:  Amiodarone 200 mg BID  Theapeutic multi-vitamin PO every day  Warfarin 5 mg every day

## 2017-06-30 NOTE — ED NOTES
Report delayed after going in to pt's room to do neuro assessment at 0900. Status change with confusion, increased right sided weakness, aphasia, hypotension, and diaphoresis. Denies CP or SOB. Pt continues in A-Fib but rate decreased to 112 and /81. Dr. Yumiko Romo advised and Dr. Nissa Lowry, tele-neurology re-evaluated patient at 56. Symptoms had resolved by this time and patient back to baseline.

## 2017-06-30 NOTE — ED TRIAGE NOTES
5507: Arrived via ems from home with daughter reporting finding patient on the floor around 0530 with slurred speech, right sided arm weakness. BS 94 en route. Pt hypertensive 173/100 upon arrival.    0650: Blood work started, patient placed on monitor. 6331: Patient in [address].     0711: Patient back to CT room for CTA. Pt HR varying from 100-140's. 0720: Delay in CTA due to patient movement.

## 2017-06-30 NOTE — PROGRESS NOTES
NUTRITION COMPLETE ASSESSMENT    RECOMMENDATIONS:   1. Encourage PO intake at meals and use supplements to give medications   - document % meals consumed in I/O flowsheet  2. Consider trial of appetite stimulant  3. New measured weight on standing scale - weekly weights to follow     Interventions/Plan:   Food/Nutrient Delivery:   (diet liberalized to 2gm na) Commercial supplement (Ensure BID, Boost Pudding)        Nutrition Education:   encouraged supplements at meals  Assessment:   Reason for Assessment: [x]BPA/MST Referral (unsure wt loss)    Diet: Cardiac  Supplements: none  Nutritionally Significant Medications: [x] Reviewed & Includes: NS @ 75ml/hr  Meal Intake: No data found. Pre-Hospitalization:  Usual Appetite: Poor  Diet at Home: regular  Vitamins/Supplements: Yes (Boost)    Current Hospitalization:   Appetite: Poor  PO Ability: Independent Average po intake:Less than 25%  Average supplements intake:        Subjective:  \"I don't know how much I weight but I haven't felt much like eating for about 6 months. \"    Objective:  Pt admitted for acute CVA. PMHx: HTN, hyperlipidemia, CHF, chronic lymphocytic leukemia. Seen by speech and cleared for regular diet but poor appetite. No lunch eaten today. Pt visited today. Temporal wasting observed. She reports decreased appetite for the past 6 months unsure of UBW recently. Minimal recent wt hx available. Wt Readings from Last 10 Encounters:   06/30/17 58.9 kg (129 lb 13.6 oz)   09/21/15 65.5 kg (144 lb 6 oz)   11/05/14 63.7 kg (140 lb 6.9 oz)   10/29/14 67.9 kg (149 lb 11.1 oz)   07/20/14 67.7 kg (149 lb 4 oz)   12/01/10 79.4 kg (175 lb 0.7 oz)   Prepares her own meals at home but vague with dietary recall. Likes: hot cocoa and sweets. Drinking Boost (chocolate) at home occassionaly, prefers over Ensure. Agreeable to Ensure BID for now and willing to try Boost Pudding (940kcal, 46g protein).  Will liberalize diet to just 2gm Na, hi lilly/hi protein to encourage PO intake. Meets Criteria for Chronic Malnutrition   [x] Severe Malnutrition, as evidenced by:   [x] Severe muscle wasting, loss of subcutaneous fat   [x] Nutritional intake of <75% of recommended intake for >1 month   [] Weight loss of  >5% in 1 month, >7.5% in 3 months, >10% in 6 months, >20% in 1 year   [] Severe edema     If intake remains low may consider appetite stimulant if consistent with pt care plan. Predict decreased in intake may be natural aging progression. Please continue to encourage PO intake at meals and use supplements to give medications to optimize intake. Estimated Nutrition Needs:   Kcals/day: 1240 Kcals/day (1240-1340kcal)  Protein:    Fluid: 1500 ml  Based On: [address] Go (x 1.2-1.3)  Weight Used: Actual wt (58.9kg)    Pt expected to meet estimated nutrient needs:  []   Yes     []  No [x] Unable to predict at this time  Nutrition Diagnosis:   1.  Malnutrition (Inadequate protein/energy intake) related to poor appetite as evidenced by decreased intake x 6 months; cachexia w/ temporal wasting    Goals:     Consumption of at least 50% meals and 1-2 supplements/day in 5-7 days; wt maintenance     Monitoring & Evaluation:    - Liquid meal replacement, Total energy intake, Protein intake   - Weight/weight change    Previous Nutrition Goals Met:   N/A  Previous Recommendations:    N/A    Education & Discharge Needs:   [] None Identified   [x] Identified and addressed    [] Participated in care plan, discharge planning, and/or interdisciplinary rounds        Cultural, Synagogue and ethnic food preferences identified: None    Skin Integrity: [x]Intact  []Other  Edema: [x]None []Other  Last BM: PTA  Food Allergies: [x]None []Other  Diet Restrictions: Cultural/Yazidism Preference(s): None     Anthropometrics:    Weight Loss Metrics 6/30/2017 9/21/2015 11/5/2014 10/29/2014 7/20/2014 12/1/2010 12/1/2010   Today's Wt 129 lb 13.6 oz 144 lb 6 oz 140 lb 6.9 oz 149 lb 11.1 oz 149 lb 4 oz 175 lb 0.7 oz 176 lb   BMI 21.61 kg/m2 24.03 kg/m2 27.57 kg/m2 29.23 kg/m2 29.15 kg/m2 30.03 kg/m2 31.18 kg/m2      Weight Source: Bed  Height: 5' 5\" (407.0 cm) (drivers license),    Body mass index is 21.61 kg/(m^2).   IBW : 56.7 kg (125 lb), % IBW (Calculated): 103.88 %   ,      Labs:    Lab Results   Component Value Date/Time    Sodium 145 06/30/2017 06:54 AM    Potassium 3.1 06/30/2017 06:54 AM    Chloride 108 06/30/2017 06:54 AM    CO2 29 06/30/2017 06:54 AM    Glucose 92 06/30/2017 06:54 AM    BUN 11 06/30/2017 06:54 AM    Creatinine 1.16 06/30/2017 06:54 AM    Calcium 9.1 06/30/2017 06:54 AM    Magnesium 2.0 11/02/2014 04:00 AM    Phosphorus 3.3 11/02/2014 04:00 AM    Albumin 3.6 06/30/2017 06:54 AM     Shaun Barbosa RD

## 2017-06-30 NOTE — PROGRESS NOTES
Speech Pathology bedside swallow evaluation/discharge  Patient: Amarilys Degroot (70 y.o. female)  Date: 6/30/2017  Primary Diagnosis: Acute CVA (cerebrovascular accident) [facility])        Precautions:        ASSESSMENT :  Based on the objective data described below, the patient presents with functional oral-pharyngeal swallow. Mastication was thorough/complete although she did take extra time. No overt s/s aspiration noted. Chart also indicated slurred speech that has since resolved. No motor speech deficits were evident at time of swallow evaluation and patient answered simple questions appropriately. Patient denies difficulty with speech. Skilled therapy provided by a speech-language pathologist is not indicated at this time. PLAN :  Recommendations:  Regular diet + thin liquids  Upright and alert for all po intake  Discharge Recommendations: None     SUBJECTIVE:   Patient stated I'm a slow eater. OBJECTIVE:     Past Medical History:   Diagnosis Date    [facility] 10/31/2014    Allergic rhinitis 1/13/2010    [facility]     HTN (hypertension) 1/13/2010    Hypercholesterolemia    No past surgical history on file. Prior Level of Function/Home Situation:      Diet prior to admission: regular  Current Diet:  regular   Cognitive and Communication Status:  Neurologic State: Alert  Orientation Level: Oriented X4  Cognition: Follows commands     Perseveration: No perseveration noted     Oral Assessment:  Oral Assessment  Labial: No impairment  Dentition: Upper & lower dentures  Oral Hygiene: moist oral mucosa  Lingual: No impairment  Velum: No impairment  Mandible: No impairment  P.O. Trials:  Patient Position: upright in bed  Vocal quality prior to P.O.: No impairment  Consistency Presented: Puree; Solid; Thin liquid  How Presented: Self-fed/presented;Spoon;Straw;Successive swallows     Bolus Acceptance: No impairment  Bolus Formation/Control: No impairment     Propulsion: No impairment  Oral Residue: None  Initiation of Swallow: No impairment  Laryngeal Elevation: Functional  Aspiration Signs/Symptoms: None  Pharyngeal Phase Characteristics: No impairment, issues, or problems   Effective Modifications: None          Oral Phase Severity: No impairment  Pharyngeal Phase Severity : No impairment  NOMS:   The NOMS functional outcome measure was used to quantify this patient's level of swallowing impairment. Based on the NOMS, the patient was determined to be at level 6 for swallow function     G Codes: In compliance with CMSs Claims Based Outcome Reporting, the following G-code set was chosen for this patient based the use of the NOMS functional outcome to quantify this patient's level of swallowing impairment. Using the NOMS, the patient was determined to be at level 6 for swallow function which correlates with the CI= 1-19% level of severity. Based on the objective assessment provided within this note, the current, goal, and discharge g-codes are as follows:    Swallow  Swallowing:   Swallow Current Status CI= 1-19%   Swallow Goal Status CI= 1-19%   Swallow D/C Status CI= 1-19%        NOMS Swallowing Levels:  Level 1 (CN): NPO  Level 2 (CM): NPO but takes consistency in therapy  Level 3 (CL): Takes less than 50% of nutrition p.o. and continues with nonoral feedings; and/or safe with mod cues; and/or max diet restriction  Level 4 (CK): Safe swallow but needs mod cues; and/or mod diet restriction; and/or still requires some nonoral feeding/supplements  Level 5 (CJ): Safe swallow with min diet restriction; and/or needs min cues  Level 6 (CI): Independent with p.o.; rare cues; usually self cues; may need to avoid some foods or needs extra time  Level 7 ([address] [place] [address]): Independent for all p.o.  OSMIN. (2003). National Outcomes Measurement System (NOMS): Adult Speech-Language Pathology User's Guide.        Pain:Pain Scale 1: Numeric (0 - 10)  Pain Intensity 1: 0  Pain Location 1: Abdomen  After treatment:   [] Patient left in no apparent distress sitting up in chair  [x] Patient left in no apparent distress in bed  [x] Call bell left within reach  [x] Nursing notified  [] Caregiver present  [] Bed alarm activated    COMMUNICATION/EDUCATION:   The patients plan of care including findings, recommendations, and recommended diet changes were discussed with: Registered Nurse. Patient was educated regarding Her deficit(s) of possible dysphagia as this relates to Her diagnosis of CVA. She demonstrated Good understanding as evidenced by verbalization. [] Posted safety precautions in patient's room. [x] Patient/family have participated as able and agree with findings and recommendations. [] Patient is unable to participate in plan of care at this time.     Thank you for this referral.  BEATRIS Medellin  Time Calculation: 12 mins

## 2017-06-30 NOTE — ED NOTES
Dr. Michail Heimlich, Hospitalist at bedside. Informed of patient's change of status. VORB for 500 cc bolus over 30 minutes.

## 2017-07-01 LAB
ANION GAP BLD CALC-SCNC: 10 MMOL/L (ref 5–15)
BASOPHILS # BLD AUTO: 0 K/UL (ref 0–0.1)
BASOPHILS # BLD: 0 % (ref 0–1)
BUN SERPL-MCNC: 12 MG/DL (ref 6–20)
BUN/CREAT SERPL: 9 (ref 12–20)
CALCIUM SERPL-MCNC: 8.8 MG/DL (ref 8.5–10.1)
CHLORIDE SERPL-SCNC: 101 MMOL/L (ref 97–108)
CHOLEST SERPL-MCNC: 145 MG/DL
CO2 SERPL-SCNC: 21 MMOL/L (ref 21–32)
CREAT SERPL-MCNC: 1.34 MG/DL (ref 0.55–1.02)
DIFFERENTIAL METHOD BLD: ABNORMAL
EOSINOPHIL # BLD: 0 K/UL (ref 0–0.4)
EOSINOPHIL NFR BLD: 0 % (ref 0–7)
ERYTHROCYTE [DISTWIDTH] IN BLOOD BY AUTOMATED COUNT: 15.2 % (ref 11.5–14.5)
GLUCOSE SERPL-MCNC: 101 MG/DL (ref 65–100)
HCT VFR BLD AUTO: 38.6 % (ref 35–47)
HDLC SERPL-MCNC: 60 MG/DL
HDLC SERPL: 2.4 {RATIO} (ref 0–5)
HGB BLD-MCNC: 12.3 G/DL (ref 11.5–16)
LDLC SERPL CALC-MCNC: 72.8 MG/DL (ref 0–100)
LIPID PROFILE,FLP: NORMAL
LYMPHOCYTES # BLD AUTO: 89 % (ref 12–49)
LYMPHOCYTES # BLD: 75.5 K/UL (ref 0.8–3.5)
MAGNESIUM SERPL-MCNC: 2 MG/DL (ref 1.6–2.4)
MCH RBC QN AUTO: 29.6 PG (ref 26–34)
MCHC RBC AUTO-ENTMCNC: 31.9 G/DL (ref 30–36.5)
MCV RBC AUTO: 92.8 FL (ref 80–99)
MONOCYTES # BLD: 1.7 K/UL (ref 0–1)
MONOCYTES NFR BLD AUTO: 2 % (ref 5–13)
NEUTS SEG # BLD: 7.6 K/UL (ref 1.8–8)
NEUTS SEG NFR BLD AUTO: 9 % (ref 32–75)
PHOSPHATE SERPL-MCNC: 3.8 MG/DL (ref 2.6–4.7)
PLATELET # BLD AUTO: 166 K/UL (ref 150–400)
POTASSIUM SERPL-SCNC: 4.8 MMOL/L (ref 3.5–5.1)
RBC # BLD AUTO: 4.16 M/UL (ref 3.8–5.2)
RBC MORPH BLD: ABNORMAL
SODIUM SERPL-SCNC: 132 MMOL/L (ref 136–145)
TRIGL SERPL-MCNC: 61 MG/DL (ref ?–150)
VLDLC SERPL CALC-MCNC: 12.2 MG/DL
WBC # BLD AUTO: 84.8 K/UL (ref 3.6–11)
WBC MORPH BLD: ABNORMAL

## 2017-07-01 PROCEDURE — 65660000000 HC RM CCU STEPDOWN

## 2017-07-01 PROCEDURE — 74011250637 HC RX REV CODE- 250/637: Performed by: INTERNAL MEDICINE

## 2017-07-01 PROCEDURE — 36415 COLL VENOUS BLD VENIPUNCTURE: CPT | Performed by: INTERNAL MEDICINE

## 2017-07-01 PROCEDURE — 83735 ASSAY OF MAGNESIUM: CPT | Performed by: INTERNAL MEDICINE

## 2017-07-01 PROCEDURE — 74011250636 HC RX REV CODE- 250/636: Performed by: INTERNAL MEDICINE

## 2017-07-01 PROCEDURE — 74011250636 HC RX REV CODE- 250/636: Performed by: NURSE PRACTITIONER

## 2017-07-01 PROCEDURE — 85025 COMPLETE CBC W/AUTO DIFF WBC: CPT | Performed by: INTERNAL MEDICINE

## 2017-07-01 PROCEDURE — 80061 LIPID PANEL: CPT | Performed by: INTERNAL MEDICINE

## 2017-07-01 PROCEDURE — 84100 ASSAY OF PHOSPHORUS: CPT | Performed by: INTERNAL MEDICINE

## 2017-07-01 PROCEDURE — 80048 BASIC METABOLIC PNL TOTAL CA: CPT | Performed by: INTERNAL MEDICINE

## 2017-07-01 RX ORDER — METOPROLOL SUCCINATE 25 MG/1
25 TABLET, EXTENDED RELEASE ORAL DAILY
Status: DISCONTINUED | OUTPATIENT
Start: 2017-07-01 | End: 2017-07-02

## 2017-07-01 RX ORDER — HYDRALAZINE HYDROCHLORIDE 20 MG/ML
10 INJECTION INTRAMUSCULAR; INTRAVENOUS
Status: DISCONTINUED | OUTPATIENT
Start: 2017-07-01 | End: 2017-07-11 | Stop reason: HOSPADM

## 2017-07-01 RX ORDER — ASPIRIN 325 MG
325 TABLET ORAL DAILY
Status: DISCONTINUED | OUTPATIENT
Start: 2017-07-02 | End: 2017-07-11 | Stop reason: HOSPADM

## 2017-07-01 RX ADMIN — ATORVASTATIN CALCIUM 20 MG: 20 TABLET, FILM COATED ORAL at 11:12

## 2017-07-01 RX ADMIN — METOPROLOL SUCCINATE 25 MG: 25 TABLET, EXTENDED RELEASE ORAL at 11:12

## 2017-07-01 RX ADMIN — CLOPIDOGREL BISULFATE 75 MG: 75 TABLET ORAL at 11:12

## 2017-07-01 RX ADMIN — HYDRALAZINE HYDROCHLORIDE 10 MG: 20 INJECTION INTRAMUSCULAR; INTRAVENOUS at 01:19

## 2017-07-01 RX ADMIN — Medication 10 ML: at 06:00

## 2017-07-01 RX ADMIN — DIGOXIN 0.12 MG: 125 TABLET ORAL at 11:12

## 2017-07-01 RX ADMIN — HEPARIN SODIUM 5000 UNITS: 5000 INJECTION, SOLUTION INTRAVENOUS; SUBCUTANEOUS at 18:45

## 2017-07-01 RX ADMIN — Medication 10 ML: at 15:10

## 2017-07-01 RX ADMIN — Medication 10 ML: at 22:00

## 2017-07-01 RX ADMIN — HEPARIN SODIUM 5000 UNITS: 5000 INJECTION, SOLUTION INTRAVENOUS; SUBCUTANEOUS at 02:46

## 2017-07-01 RX ADMIN — HEPARIN SODIUM 5000 UNITS: 5000 INJECTION, SOLUTION INTRAVENOUS; SUBCUTANEOUS at 11:12

## 2017-07-01 NOTE — PROGRESS NOTES
Hospitalist Progress Note  Mae Lopez MD  Office: [phone]  Cell:       Date of Service:  2017  NAME:  Anoop Padilla  :  10/20/1932  MRN:  [id number]      Admission Summary:    The patient is an 80-year-old   [facility] American female with past medical history of hypertension,   hypercholesterolemia, chronic lymphocytic leukemia, prior history of   atrial fibrillation,was not on any   anticoagulation, as well as previous history of stroke, who came to the   emergency room complaining of right lower extremity weakness x2   days. The patient reported that 2 days ago, she started having some   difficulties with walking because of right lower extremity weakness.              Interval history / Subjective:    Drowsy has no new complaints     Assessment & Plan:   1. Left MCA infarct. MRI pending. Continue current plan with Statin, ASA, Plavix. 2 Atrial flutter on Beta blocker seen by Cardiology Echo shows   Ejection  fraction was estimated in the range of 40 % to 45 %. There was severe  hypokinesis of the basal-mid anteroseptal and apical septal wall(s). There  was moderate concentric hypertrophy. She will need anticoagulation once [facility] with Neurology. 3. Hypertension: treatment per stroke guidelines SBP > 220 DBP > 120. Discussed with RN  4.  History of CLL          Care Plan discussed with: Nurse  Disposition: Home w/Family and TBD     Hospital Problems  Date Reviewed: 2017          Codes Class Noted POA    * (Principal)Cerebrovascular accident (CVA) due to thrombosis of left middle cerebral artery ([facility] ICD-10-CM: T66.402  ICD-9-CM: 434.01  2017 Yes        CLL (chronic lymphocytic leukemia) ([facility] ICD-10-CM: C91.10  ICD-9-CM: 204.10  Unknown Yes        CHF (congestive heart failure) ([facility] ICD-10-CM: I50.9  ICD-9-CM: 428.0  10/24/2014 Yes        HTN (hypertension) ICD-10-CM: I10  ICD-9-CM: 401.9  2010 Yes        Mixed hyperlipidemia ICD-10-CM: E78.2  ICD-9-CM: 272.2  1/13/2010 Yes                Review of Systems:   A comprehensive review of systems was negative except for that written in the HPI. Vital Signs:    Last 24hrs VS reviewed since prior progress note. Most recent are:  Visit Vitals    BP (!) 153/91 (BP 1 Location: Right arm, BP Patient Position: At rest)    Pulse 99    Temp 97.6 °F (36.4 °C)    Resp 20    Ht 5' 5\" (1.651 m)    Wt 58.5 kg (128 lb 15.5 oz)    SpO2 96%    Breastfeeding No    BMI 21.46 kg/m2         Intake/Output Summary (Last 24 hours) at 07/01/17 1035  Last data filed at 07/01/17 0400   Gross per 24 hour   Intake           1117.5 ml   Output                0 ml   Net           1117.5 ml        Physical Examination:             Constitutional:  No acute distress, cooperative, pleasant    ENT:  Oral mucous moist, oropharynx benign. Neck supple,    Resp:  CTA bilaterally. No wheezing/rhonchi/rales. No accessory muscle use   CV:  Regular rhythm, normal rate, no murmurs, gallops, rubs    GI:  Soft, non distended, non tender. normoactive bowel sounds, no hepatosplenomegaly     Musculoskeletal:  No edema, warm, 2+ pulses throughout    Neurologic:  Moves all extremities. AAOx3, CN II-XII reviewed     Psych:  Good insight, Not anxious nor agitated.        Data Review:    Review and/or order of clinical lab test      Labs:     Recent Labs      07/01/17   0248  06/30/17   0654   WBC  84.8*  75.3*   HGB  12.3  11.4*   HCT  38.6  35.7   PLT  166  174     Recent Labs      07/01/17   0248  06/30/17   0654   NA  132*  145   K  4.8  3.1*   CL  101  108   CO2  21  29   BUN  12  11   CREA  1.34*  1.16*   GLU  101*  92   CA  8.8  9.1   MG  2.0   --    PHOS  3.8   --      Recent Labs      06/30/17   0654   SGOT  76*   ALT  62   AP  87   TBILI  0.7   TP  7.2   ALB  3.6   GLOB  3.6     Recent Labs      06/30/17   0654  06/30/17   0651   INR  1.1  0.9   PTP  10.9   --    APTT  25.5   --       No results for input(s): FE, TIBC, PSAT, FERR in the last 72 hours. No results found for: FOL, RBCF   No results for input(s): PH, PCO2, PO2 in the last 72 hours.   Recent Labs      06/30/17   0654   TROIQ  <0.04     Lab Results   Component Value Date/Time    Cholesterol, total 145 07/01/2017 02:48 AM    HDL Cholesterol 60 07/01/2017 02:48 AM    LDL, calculated 72.8 07/01/2017 02:48 AM    Triglyceride 61 07/01/2017 02:48 AM    CHOL/HDL Ratio 2.4 07/01/2017 02:48 AM     Lab Results   Component Value Date/Time    Glucose (POC) 92 06/30/2017 06:49 AM    Glucose (POC) 90 11/02/2014 12:20 PM     Lab Results   Component Value Date/Time    Color YELLOW/STRAW 06/30/2017 08:28 AM    Appearance CLEAR 06/30/2017 08:28 AM    Specific gravity 1.014 06/30/2017 08:28 AM    pH (UA) 7.5 06/30/2017 08:28 AM    Protein NEGATIVE  06/30/2017 08:28 AM    Glucose NEGATIVE  06/30/2017 08:28 AM    Ketone NEGATIVE  06/30/2017 08:28 AM    Bilirubin NEGATIVE  06/30/2017 08:28 AM    Urobilinogen 0.2 06/30/2017 08:28 AM    Nitrites NEGATIVE  06/30/2017 08:28 AM    Leukocyte Esterase NEGATIVE  06/30/2017 08:28 AM    Epithelial cells FEW 06/30/2017 08:28 AM    Bacteria NEGATIVE  06/30/2017 08:28 AM    WBC 0-4 06/30/2017 08:28 AM    RBC 0-5 06/30/2017 08:28 AM         Medications Reviewed:     Current Facility-Administered Medications   Medication Dose Route Frequency    hydrALAZINE (APRESOLINE) 20 mg/mL injection 10 mg  10 mg IntraVENous Q6H PRN    metoprolol succinate (TOPROL-XL) XL tablet 25 mg  25 mg Oral DAILY    sodium chloride (NS) flush 5-10 mL  5-10 mL IntraVENous Q8H    sodium chloride (NS) flush 5-10 mL  5-10 mL IntraVENous PRN    acetaminophen (TYLENOL) tablet 650 mg  650 mg Oral Q4H PRN    Or    acetaminophen (TYLENOL) solution 650 mg  650 mg Per NG tube Q4H PRN    Or    acetaminophen (TYLENOL) suppository 650 mg  650 mg Rectal Q4H PRN    heparin (porcine) injection 5,000 Units  5,000 Units SubCUTAneous Q8H    atorvastatin (LIPITOR) tablet 20 mg  20 mg Oral DAILY    clopidogrel (PLAVIX) tablet 75 mg  75 mg Oral DAILY    digoxin (LANOXIN) tablet 0.125 mg  0.125 mg Oral DAILY    hydrALAZINE (APRESOLINE) tablet 10 mg  10 mg Oral BID     ______________________________________________________________________  EXPECTED LENGTH OF STAY: - - -  ACTUAL LENGTH OF STAY:          [address] MD

## 2017-07-01 NOTE — PROGRESS NOTES
Bedside shift change report given to DONALD Davila (oncoming nurse) by Loni Plaza (offgoing nurse). Report included the following information SBAR, Kardex, ED Summary, Intake/Output, MAR, Recent Results and Cardiac Rhythm AFib.

## 2017-07-01 NOTE — PROGRESS NOTES
David sent home with Nichole Myers her granddaughter. Bedside and Verbal shift change report given to [place]. Report included the following information SBAR.

## 2017-07-01 NOTE — PROGRESS NOTES
Neurology Progress Note    Patient ID:  Elena Mcbride  [id number]  80 y.o.  10/20/1932    Subjective:      Patient w/o acute complaints. MRI Brain completed and c/w L>R hemispheric punctate infarcts likely embolic. Current Facility-Administered Medications   Medication Dose Route Frequency    hydrALAZINE (APRESOLINE) 20 mg/mL injection 10 mg  10 mg IntraVENous Q6H PRN    metoprolol succinate (TOPROL-XL) XL tablet 25 mg  25 mg Oral DAILY    sodium chloride (NS) flush 5-10 mL  5-10 mL IntraVENous Q8H    sodium chloride (NS) flush 5-10 mL  5-10 mL IntraVENous PRN    acetaminophen (TYLENOL) tablet 650 mg  650 mg Oral Q4H PRN    Or    acetaminophen (TYLENOL) solution 650 mg  650 mg Per NG tube Q4H PRN    Or    acetaminophen (TYLENOL) suppository 650 mg  650 mg Rectal Q4H PRN    heparin (porcine) injection 5,000 Units  5,000 Units SubCUTAneous Q8H    atorvastatin (LIPITOR) tablet 20 mg  20 mg Oral DAILY    clopidogrel (PLAVIX) tablet 75 mg  75 mg Oral DAILY    digoxin (LANOXIN) tablet 0.125 mg  0.125 mg Oral DAILY          Objective:     Patient Vitals for the past 8 hrs:   BP Temp Pulse Resp SpO2   07/01/17 1500 (!) 178/95 99.1 °F (37.3 °C) 99 24 97 %   07/01/17 1100 (!) 168/111 98.7 °F (37.1 °C) 96 23 95 %       07/01 0701 - 07/01 1900  In: 60 [P.O.:60]  Out: -   06/29 1901 - 07/01 0700  In: 1354.5 [P.O.:237;  I.V.:1117.5]  Out: 800 [Urine:800]    Lab Review   Recent Results (from the past 24 hour(s))   TSH 3RD GENERATION    Collection Time: 06/30/17  6:35 PM   Result Value Ref Range    TSH 1.35 0.36 - 3.74 uIU/mL   LIPID PANEL    Collection Time: 07/01/17  2:48 AM   Result Value Ref Range    LIPID PROFILE          Cholesterol, total 145 <200 MG/DL    Triglyceride 61 <150 MG/DL    HDL Cholesterol 60 MG/DL    LDL, calculated 72.8 0 - 100 MG/DL    VLDL, calculated 12.2 MG/DL    CHOL/HDL Ratio 2.4 0 - 5.0     MAGNESIUM    Collection Time: 07/01/17  2:48 AM   Result Value Ref Range    Magnesium 2.0 1.6 - 2.4 mg/dL   PHOSPHORUS    Collection Time: 07/01/17  2:48 AM   Result Value Ref Range    Phosphorus 3.8 2.6 - 4.7 MG/DL   CBC WITH AUTOMATED DIFF    Collection Time: 07/01/17  2:48 AM   Result Value Ref Range    WBC 84.8 (HH) 3.6 - 11.0 K/uL    RBC 4.16 3.80 - 5.20 M/uL    HGB 12.3 11.5 - 16.0 g/dL    HCT 38.6 35.0 - 47.0 %    MCV 92.8 80.0 - 99.0 FL    MCH 29.6 26.0 - 34.0 PG    MCHC 31.9 30.0 - 36.5 g/dL    RDW 15.2 (H) 11.5 - 14.5 %    PLATELET 993 428 - 406 K/uL    NEUTROPHILS 9 (L) 32 - 75 %    LYMPHOCYTES 89 (H) 12 - 49 %    MONOCYTES 2 (L) 5 - 13 %    EOSINOPHILS 0 0 - 7 %    BASOPHILS 0 0 - 1 %    ABS. NEUTROPHILS 7.6 1.8 - 8.0 K/UL    ABS. LYMPHOCYTES 75.5 (H) 0.8 - 3.5 K/UL    ABS. MONOCYTES 1.7 (H) 0.0 - 1.0 K/UL    ABS. EOSINOPHILS 0.0 0.0 - 0.4 K/UL    ABS. BASOPHILS 0.0 0.0 - 0.1 K/UL    DF SMEAR SCANNED      RBC COMMENTS ANISOCYTOSIS  1+        WBC COMMENTS SMUDGE CELLS SEEN     METABOLIC PANEL, BASIC    Collection Time: 07/01/17  2:48 AM   Result Value Ref Range    Sodium 132 (L) 136 - 145 mmol/L    Potassium 4.8 3.5 - 5.1 mmol/L    Chloride 101 97 - 108 mmol/L    CO2 21 21 - 32 mmol/L    Anion gap 10 5 - 15 mmol/L    Glucose 101 (H) 65 - 100 mg/dL    BUN 12 6 - 20 MG/DL    Creatinine 1.34 (H) 0.55 - 1.02 MG/DL    BUN/Creatinine ratio 9 (L) 12 - 20      GFR est AA 46 (L) >60 ml/min/1.73m2    GFR est non-AA 38 (L) >60 ml/min/1.73m2    Calcium 8.8 8.5 - 10.1 MG/DL       Assessment:     Principal Problem:    Cerebrovascular accident (CVA) due to thrombosis of left middle cerebral artery (HCC) (6/30/2017)    Active Problems:    HTN (hypertension) (1/13/2010)      Mixed hyperlipidemia (1/13/2010)      CHF (congestive heart failure) (HCC) (10/24/2014)      CLL (chronic lymphocytic leukemia) (HCC) ()    80year old AAF h/o CLL, HTN, HPL, Aflutter/Afib presenting with RLE weakness. MRI Brain completed and independently reviewed showing b/l L>R hemispheric punctate infarcts most c/w cardioembolic source.   No significant vessel stenosis or occlusion on CTA. There is also concern for possible cerebral amyloid angiopathy given multifocal remote microhemorrhages on imaging. As such, I would be hesitate to initiate [address] and would elect to continue with ASA monotherapy for stroke prevention.     Plan:   ASA 325mg daily along with Lipitor 20mg for stroke prevention  Stroke education  PT/OT/ST eval  Please arrange for Neuro F/U Dr. Marlene Fritz in 4-6 weeks      Signed:  Flores Hartman DO  7/1/2017  4:06 PM

## 2017-07-01 NOTE — CONSULTS
Consult Note    Date of  Admission: 6/30/2017  6:38 AM     Mike Ferrell is a 80 y.o. female admitted for Acute CVA (cerebrovascular accident) ([facility] Consult requested by Nicolás Mercado MD    Assessment  · Paroxysmal atrial flutter with variable AV block: last office visit with me in April 2016, she was in sinus rhythm  · Acute L MCA infarction with RLE weakness  · Hypertension, somewhat labile with transient hypotension after meds were given for rate slowing with transient worsening of her neurologic deficit in the ED  · History of Takotsubo CM in [id number] with no known recurrence  · CLL currently with very high WBC's    Recommendations:  1. Trial of digoxin for rate control which won't have an effect on her BP  2. Should be formally anticoagulated several weeks down the road when her acute CVA has healed ( at the neurologist's direction)  3. Not a candidate for cardioversion  4. Echo to assess LV size and systolic function  Thank you for this referral  Meghann Quinonez MD  Subjective:  Right leg weakness    Patient Active Problem List    Diagnosis Date Noted    Cerebrovascular accident (CVA) due to thrombosis of left middle cerebral artery ([address] 06/30/2017    CLL (chronic lymphocytic leukemia) ([facility]     A-fib ([facility] 10/31/2014    CHF (congestive heart failure) ([address] 10/24/2014    Lower GI bleed 07/18/2014    GIB (gastrointestinal bleeding) 07/18/2014    Calf pain 06/23/2010    HTN (hypertension) 01/13/2010    History of transient ischemic attack (TIA) 01/13/2010    Allergic rhinitis 01/13/2010    Mixed hyperlipidemia 01/13/2010    Smoker 01/13/2010      Past Medical History:   Diagnosis Date    A-fib ([address] 10/31/2014    Allergic rhinitis 1/13/2010    CLL (chronic lymphocytic leukemia) ([facility]     HTN (hypertension) 1/13/2010    Hypercholesterolemia       No past surgical history on file.   Allergies   Allergen Reactions    Ace Inhibitors Cough    Amlodipine Swelling    Atenolol Nausea Only  Chlorthalidone Other (comments)     Urinary incontinence    Felodipine Swelling    Hyzaar [Losartan-Hydrochlorothiazide] Other (comments)     headache             Review of Symptoms:  Constitutional: positive for weight loss  Cardiac: ; pertinent negatives - chest pain, chest pressure/discomfort, dyspnea, palpitations, irregular heart beats, orthopnea, paroxysmal nocturnal dyspnea, exertional chest pressure/discomfort, claudication, lower extremity edema, tachypnea, dyspnea on exertion, dizziness  Respiratory: negative for cough, sputum, hemoptysis, wheezing, dyspnea on exertion or stridor  Gastrointestinal: negative for dysphagia, odynophagia, dyspepsia, reflux symptoms, nausea, vomiting, melena, diarrhea and abdominal pain  Musculoskeletal:positive for muscle weakness  Neurological: positive for weakness  Other systems reviewed and negative except as above. Physical Exam    Visit Vitals    BP (!) 158/107 (BP 1 Location: Left arm, BP Patient Position: At rest;Supine)    Pulse 85    Temp 98.6 °F (37 °C)    Resp 18    Ht 5' 5\" (1.651 m)  Comment: drivers license    Wt 37.3 kg (129 lb 13.6 oz)    SpO2 99%    Breastfeeding No    BMI 21.61 kg/m2     Neck: supple, symmetrical, trachea midline, no adenopathy, thyroid: not enlarged, symmetric, no tenderness/mass/nodules, no carotid bruit and no JVD  Heart: irregularly irregular rhythm, S1, S2 normal  Lungs: clear to auscultation bilaterally, normal percussion bilaterally  Abdomen: soft, non-tender.  Bowel sounds normal. No masses,  no organomegaly  Extremities: extremities normal, atraumatic, no cyanosis or edema  Pulses: 2+ and symmetric  Neurologic: Mental status: Alert, oriented, thought content appropriate  Motor:RLE weakness    Cardiographics    Telemetry: atrial flutter  ECG: atrial flutter with a rapid ventricular response  Echocardiogram: Abnormal, and reviewed by myself: LV EF 40%, giant left atrium, moderate NR MR    Recent radiology, intake/output and wt reviewed    Lab Results  Component Value Date/Time   WBC 75.3 06/30/2017 06:54 AM   HGB 11.4 06/30/2017 06:54 AM   HCT 35.7 06/30/2017 06:54 AM   PLATELET 253 69/55/8116 06:54 AM   MCV 91.5 06/30/2017 06:54 AM   Lab Results  Component Value Date/Time   Cholesterol, total 177 10/25/2014 05:04 AM   HDL Cholesterol 49 10/25/2014 05:04 AM   LDL, calculated 117.2 10/25/2014 05:04 AM   Triglyceride 54 10/25/2014 05:04 AM   CHOL/HDL Ratio 3.6 10/25/2014 05:04 AM   Lab Results  Component Value Date/Time   ALT (SGPT) 62 06/30/2017 06:54 AM   AST (SGOT) 76 06/30/2017 06:54 AM   Alk.  phosphatase 87 06/30/2017 06:54 AM   Bilirubin, direct 0.1 09/30/2009 12:45 PM   Bilirubin, total 0.7 06/30/2017 06:54 AM   Albumin 3.6 06/30/2017 06:54 AM   Protein, total 7.2 06/30/2017 06:54 AM   INR 1.1 06/30/2017 06:54 AM   Prothrombin time 10.9 06/30/2017 06:54 AM   PLATELET 253 07/02/8620 06:54 AM       Lab Results  Component Value Date/Time   GFR est non-AA 45 06/30/2017 06:54 AM   GFR est AA 54 06/30/2017 06:54 AM   Creatinine 1.16 06/30/2017 06:54 AM   BUN 11 06/30/2017 06:54 AM   Sodium 145 06/30/2017 06:54 AM   Potassium 3.1 06/30/2017 06:54 AM   Chloride 108 06/30/2017 06:54 AM   CO2 29 06/30/2017 06:54 AM   Magnesium 2.0 11/02/2014 04:00 AM   Phosphorus 3.3 11/02/2014 04:00 AM   Lab Results  Component Value Date/Time   TSH 1.35 06/30/2017 06:35 PM

## 2017-07-01 NOTE — PROGRESS NOTES
Spiritual Care Assessment/Progress Notes    Thalia Lew [id number]  [id number]    10/20/1932  80 y.o.  female    Patient Telephone Number: [phone] (home)   Sabianist Affiliation: Myrna Crabtree   Language: English   Extended Emergency Contact Information  Primary Emergency Contact: [address] Phone: [phone]  Mobile Phone: [phone]  Relation: Other Relative  Secondary Emergency Contact: [address] Phone: [phone]  Relation: None   Patient Active Problem List    Diagnosis Date Noted    Cerebrovascular accident (CVA) due to thrombosis of left middle cerebral artery ([facility] 06/30/2017    CLL (chronic lymphocytic leukemia) ([facility]     A-fib ([facility] 10/31/2014    CHF (congestive heart failure) ([facility] 10/24/2014    Lower GI bleed 07/18/2014    GIB (gastrointestinal bleeding) 07/18/2014    Calf pain 06/23/2010    HTN (hypertension) 01/13/2010    History of transient ischemic attack (TIA) 01/13/2010    Allergic rhinitis 01/13/2010    Mixed hyperlipidemia 01/13/2010    Smoker 01/13/2010        Date: 7/1/2017       Level of Sabianist/Spiritual Activity:  []         Involved in brett tradition/spiritual practice    []         Not involved in brett tradition/spiritual practice  []         Spiritually oriented    []         Claims no spiritual orientation    []         seeking spiritual identity  []         Feels alienated from Restorationist practice/tradition  []         Feels angry about Restorationist practice/tradition  [x]         Spirituality/Restorationist tradition may be a resource for coping at this time.   []         Not able to assess due to medical condition    Services Provided Today:  []         crisis intervention    []         reading Scriptures  [x]         spiritual assessment    []         prayer  []         empathic listening/emotional support  []         rites and rituals (cite in comments)  []         life review     [] Hinduism support  []         theological development   []         advocacy  []         ethical dialog     []         blessing  []         bereavement support    [x]         support to family  []         anticipatory grief support   [x]         help with AMD  []         spiritual guidance    []         meditation      Spiritual Care Needs  []         Emotional Support  []         Spiritual/Temple Care  []         Loss/Adjustment  []         Advocacy/Referral                /Ethics  [x]         No needs expressed at               this time  []         Other: (note in               comments)  [address]  []         Follow up visits with               pt/family  []         Provide materials  []         Schedule sacraments  []         Contact Community               Clergy  [x]         Follow up as needed  []         Other: (note in               comments)     Provided support to this pt in [facility]. [address] introduced self and explained role in care team as well as purpose of this visit. Pt's granddaughter had questions about the purpose of an AMD.  [address] answered questions but informed pt, per RN assessment, pt was unable to complete AMD at this time. Advised pt of chaplains' availability to offer assistance with completing AMD when ready. [address] assured pt's granddaughter of prayers and concluded by affirming ongoing availability of support.

## 2017-07-01 NOTE — PROGRESS NOTES
2340:pt's BP high 167/100, rechecked /166, Spoke with Tsering Pemberton NP order received for hydralazine IV prn. Bedside and Verbal shift change report given to Gerard Pimentel (oncoming nurse) by Parveen Dennis (offgoing nurse). Report included the following information SBAR, Kardex, ED Summary, MAR and Cardiac Rhythm A flutter.

## 2017-07-01 NOTE — PROGRESS NOTES
Cardiology Progress Note      7/1/2017 9:55 AM    Admit Date: 6/30/2017    Admit Diagnosis: Acute CVA (cerebrovascular accident) [facility])      Subjective:     Mike Ferrell has no new complaints  Somnolent but rousable    Current Facility-Administered Medications   Medication Dose Route Frequency    hydrALAZINE (APRESOLINE) 20 mg/mL injection 10 mg  10 mg IntraVENous Q6H PRN    sodium chloride (NS) flush 5-10 mL  5-10 mL IntraVENous Q8H    sodium chloride (NS) flush 5-10 mL  5-10 mL IntraVENous PRN    acetaminophen (TYLENOL) tablet 650 mg  650 mg Oral Q4H PRN    Or    acetaminophen (TYLENOL) solution 650 mg  650 mg Per NG tube Q4H PRN    Or    acetaminophen (TYLENOL) suppository 650 mg  650 mg Rectal Q4H PRN    heparin (porcine) injection 5,000 Units  5,000 Units SubCUTAneous Q8H    atorvastatin (LIPITOR) tablet 20 mg  20 mg Oral DAILY    clopidogrel (PLAVIX) tablet 75 mg  75 mg Oral DAILY    digoxin (LANOXIN) tablet 0.125 mg  0.125 mg Oral DAILY    hydrALAZINE (APRESOLINE) tablet 10 mg  10 mg Oral BID         Objective:      Physical Exam:  Visit Vitals    BP (!) 153/91 (BP 1 Location: Right arm, BP Patient Position: At rest)    Pulse 99    Temp 97.6 °F (36.4 °C)    Resp 20    Ht 5' 5\" (1.651 m)  Comment: drivers license    Wt 53.4 kg (128 lb 15.5 oz)    SpO2 96%    Breastfeeding No    BMI 21.46 kg/m2     General Appearance:  Well developed, well nourished, somnolent but rousable, and individual in no acute distress. Ears/Nose/Mouth/Throat:   Hearing grossly normal.         Neck: Supple. Chest:   Lungs clear to auscultation bilaterally. Cardiovascular:  Irregularly irregular, S1, S2 normal, no murmur. Abdomen:   Soft, non-tender, bowel sounds are active. Extremities: No edema bilaterally. Skin: Warm and dry.                  Data Review:   Labs:  Recent Results (from the past 24 hour(s))   TSH 3RD GENERATION    Collection Time: 06/30/17  6:35 PM   Result Value Ref Range    TSH 1.35 0.36 - 3.74 uIU/mL   LIPID PANEL    Collection Time: 07/01/17  2:48 AM   Result Value Ref Range    LIPID PROFILE          Cholesterol, total 145 <200 MG/DL    Triglyceride 61 <150 MG/DL    HDL Cholesterol 60 MG/DL    LDL, calculated 72.8 0 - 100 MG/DL    VLDL, calculated 12.2 MG/DL    CHOL/HDL Ratio 2.4 0 - 5.0     MAGNESIUM    Collection Time: 07/01/17  2:48 AM   Result Value Ref Range    Magnesium 2.0 1.6 - 2.4 mg/dL   PHOSPHORUS    Collection Time: 07/01/17  2:48 AM   Result Value Ref Range    Phosphorus 3.8 2.6 - 4.7 MG/DL   CBC WITH AUTOMATED DIFF    Collection Time: 07/01/17  2:48 AM   Result Value Ref Range    WBC 84.8 (HH) 3.6 - 11.0 K/uL    RBC 4.16 3.80 - 5.20 M/uL    HGB 12.3 11.5 - 16.0 g/dL    HCT 38.6 35.0 - 47.0 %    MCV 92.8 80.0 - 99.0 FL    MCH 29.6 26.0 - 34.0 PG    MCHC 31.9 30.0 - 36.5 g/dL    RDW 15.2 (H) 11.5 - 14.5 %    PLATELET 333 346 - 874 K/uL    NEUTROPHILS 9 (L) 32 - 75 %    LYMPHOCYTES 89 (H) 12 - 49 %    MONOCYTES 2 (L) 5 - 13 %    EOSINOPHILS 0 0 - 7 %    BASOPHILS 0 0 - 1 %    ABS. NEUTROPHILS 7.6 1.8 - 8.0 K/UL    ABS. LYMPHOCYTES 75.5 (H) 0.8 - 3.5 K/UL    ABS. MONOCYTES 1.7 (H) 0.0 - 1.0 K/UL    ABS. EOSINOPHILS 0.0 0.0 - 0.4 K/UL    ABS. BASOPHILS 0.0 0.0 - 0.1 K/UL    DF SMEAR SCANNED      RBC COMMENTS ANISOCYTOSIS  1+        WBC COMMENTS SMUDGE CELLS SEEN     METABOLIC PANEL, BASIC    Collection Time: 07/01/17  2:48 AM   Result Value Ref Range    Sodium 132 (L) 136 - 145 mmol/L    Potassium 4.8 3.5 - 5.1 mmol/L    Chloride 101 97 - 108 mmol/L    CO2 21 21 - 32 mmol/L    Anion gap 10 5 - 15 mmol/L    Glucose 101 (H) 65 - 100 mg/dL    BUN 12 6 - 20 MG/DL    Creatinine 1.34 (H) 0.55 - 1.02 MG/DL    BUN/Creatinine ratio 9 (L) 12 - 20      GFR est AA 46 (L) >60 ml/min/1.73m2    GFR est non-AA 38 (L) >60 ml/min/1.73m2    Calcium 8.8 8.5 - 10.1 MG/DL       Telemetry: AFIB, rate controlled    Echo 6/30/17  SUMMARY:  Left ventricle: Systolic function was moderately reduced. Ejection  fraction was estimated in the range of 40 % to 45 %. There was severe  hypokinesis of the basal-mid anteroseptal and apical septal wall(s). There  was moderate concentric hypertrophy. Left atrium: The atrium was dilated. Mitral valve: There was mild regurgitation. Tricuspid valve: There was mild regurgitation. Assessment:     1) L MCA territory stroke    2) Typical atrial flutter    3) Cardiomyopathy    4) HTN    5) CLL    Plan: Add metoprolol 25mg daily  Continue plavix 75mg daily  Should ultimately be anticoagulated ideally, probably in 2 weeks assuming a large sized stroke once MRI results back.   Could begin anticoagulant earlier if stroke small by MRI

## 2017-07-02 PROCEDURE — 74011250637 HC RX REV CODE- 250/637: Performed by: HOSPITALIST

## 2017-07-02 PROCEDURE — 74011250636 HC RX REV CODE- 250/636: Performed by: INTERNAL MEDICINE

## 2017-07-02 PROCEDURE — 74011250637 HC RX REV CODE- 250/637: Performed by: INTERNAL MEDICINE

## 2017-07-02 PROCEDURE — 74011250637 HC RX REV CODE- 250/637: Performed by: PSYCHIATRY & NEUROLOGY

## 2017-07-02 PROCEDURE — 97530 THERAPEUTIC ACTIVITIES: CPT

## 2017-07-02 PROCEDURE — 65660000000 HC RM CCU STEPDOWN

## 2017-07-02 PROCEDURE — 74011250636 HC RX REV CODE- 250/636: Performed by: HOSPITALIST

## 2017-07-02 PROCEDURE — 97161 PT EVAL LOW COMPLEX 20 MIN: CPT

## 2017-07-02 RX ORDER — SODIUM CHLORIDE 9 MG/ML
75 INJECTION, SOLUTION INTRAVENOUS CONTINUOUS
Status: DISCONTINUED | OUTPATIENT
Start: 2017-07-02 | End: 2017-07-06

## 2017-07-02 RX ORDER — METOPROLOL SUCCINATE 50 MG/1
50 TABLET, EXTENDED RELEASE ORAL DAILY
Status: DISCONTINUED | OUTPATIENT
Start: 2017-07-02 | End: 2017-07-11 | Stop reason: HOSPADM

## 2017-07-02 RX ADMIN — DIGOXIN 0.12 MG: 125 TABLET ORAL at 09:29

## 2017-07-02 RX ADMIN — Medication 325 MG: at 09:16

## 2017-07-02 RX ADMIN — Medication 10 ML: at 22:00

## 2017-07-02 RX ADMIN — HEPARIN SODIUM 5000 UNITS: 5000 INJECTION, SOLUTION INTRAVENOUS; SUBCUTANEOUS at 17:35

## 2017-07-02 RX ADMIN — Medication 10 ML: at 15:35

## 2017-07-02 RX ADMIN — SODIUM CHLORIDE 30 ML/HR: 900 INJECTION, SOLUTION INTRAVENOUS at 16:34

## 2017-07-02 RX ADMIN — HEPARIN SODIUM 5000 UNITS: 5000 INJECTION, SOLUTION INTRAVENOUS; SUBCUTANEOUS at 02:11

## 2017-07-02 RX ADMIN — METOPROLOL SUCCINATE 50 MG: 50 TABLET, EXTENDED RELEASE ORAL at 09:16

## 2017-07-02 RX ADMIN — Medication 10 ML: at 06:00

## 2017-07-02 RX ADMIN — ATORVASTATIN CALCIUM 20 MG: 20 TABLET, FILM COATED ORAL at 09:16

## 2017-07-02 RX ADMIN — HEPARIN SODIUM 5000 UNITS: 5000 INJECTION, SOLUTION INTRAVENOUS; SUBCUTANEOUS at 09:16

## 2017-07-02 NOTE — PROGRESS NOTES
Cardiology Progress Note      7/2/2017 9:55 AM    Admit Date: 6/30/2017    Admit Diagnosis: Acute CVA (cerebrovascular accident) [facility])      Subjective:     Claudell Critchley has no new complaints  More alert today  Sitting out in chair    Current Facility-Administered Medications   Medication Dose Route Frequency    metoprolol succinate (TOPROL-XL) XL tablet 50 mg  50 mg Oral DAILY    hydrALAZINE (APRESOLINE) 20 mg/mL injection 10 mg  10 mg IntraVENous Q6H PRN    aspirin (ASPIRIN) tablet 325 mg  325 mg Oral DAILY    sodium chloride (NS) flush 5-10 mL  5-10 mL IntraVENous Q8H    sodium chloride (NS) flush 5-10 mL  5-10 mL IntraVENous PRN    acetaminophen (TYLENOL) tablet 650 mg  650 mg Oral Q4H PRN    Or    acetaminophen (TYLENOL) solution 650 mg  650 mg Per NG tube Q4H PRN    Or    acetaminophen (TYLENOL) suppository 650 mg  650 mg Rectal Q4H PRN    heparin (porcine) injection 5,000 Units  5,000 Units SubCUTAneous Q8H    atorvastatin (LIPITOR) tablet 20 mg  20 mg Oral DAILY    digoxin (LANOXIN) tablet 0.125 mg  0.125 mg Oral DAILY         Objective:      Physical Exam:  Visit Vitals    BP (!) 133/94 (BP 1 Location: Right arm, BP Patient Position: At rest)    Pulse 100    Temp 98.6 °F (37 °C)    Resp 21    Ht 5' 5\" (1.651 m)  Comment: drivers license    Wt 95.1 kg (129 lb 10.1 oz)    SpO2 100%    Breastfeeding No    BMI 21.57 kg/m2     General Appearance:  Well developed, well nourished, alert, and individual in no acute distress. Ears/Nose/Mouth/Throat:   Hearing grossly normal.         Neck: Supple. Chest:   Lungs clear to auscultation bilaterally. Cardiovascular:  Irregularly irregular, S1, S2 normal, no murmur. Abdomen:   Soft, non-tender, bowel sounds are active. Extremities: No edema bilaterally. Skin: Warm and dry. Data Review:   Labs:  No results found for this or any previous visit (from the past 24 hour(s)).     Telemetry: AFIB, rate controlled    Brain MRI  IMPRESSION:  Extensive chronic ischemic changes with small foci of acute cortical and  subcortical infarction left frontoparietal junction near the vertex and  parasagittal posterior right frontal lobe cortex.  Numerous foci of remote  microhemorrhage may indicate underlying vasculopathy such as amyloid angiopathy.       Assessment:     1) L MCA territory stroke  Small sized per MRI    2) Typical atrial flutter    3) Cardiomyopathy    4) HTN    5) CLL    Plan:     Agree with increasing metoprolol further to 50mg  Neurology recommending against anticoagulation and rather favoring single antiplatelet therapy

## 2017-07-02 NOTE — PROGRESS NOTES
Concern for pt not taking in enough water at this time and waiting on speech evaluation. TORB from Dr. Jorge Bautista for 30 ml/hr for 10 hours of normal saline. Pt made NPO except meds per dysphagia protocol until cleared by speech therapy tomorrow morning. Bedside and Verbal shift change report given to [place]. Report included the following information SBAR.

## 2017-07-02 NOTE — PROGRESS NOTES
Problem: Mobility Impaired (Adult and Pediatric)  Goal: *Acute Goals and Plan of Care (Insert Text)  Physical Therapy Goals  Initiated 7/2/2017  1. Patient will move from supine to sit and sit to supine in bed with minimal assistance within 7 day(s). 2. Patient will transfer from bed to chair and chair to bed with moderate assistance using the least restrictive device within 7 day(s). 3. Patient will perform sit to stand with minimal assistance within 7 day(s). 4. Patient will ambulate with moderate assistance for 15 feet with the least restrictive device within 7 day(s). 5. Pt will increase Rice Balance score by 4-5 points to decrease risk of falls within 7 days. PHYSICAL THERAPY EVALUATION- NEURO POPULATION     Patient: Amrit Brice (73 y.o. female)  Date: 7/2/2017  Primary Diagnosis: Acute CVA (cerebrovascular accident) [facility])        Precautions: R LE weakness> UE         ASSESSMENT :  Chart reviewed. Patient cleared to be seen by nursing staff. Pt A & O x 2, responding to commands though delayed. Pt having some word finding difficulties and minimal facial droop noted on the right. Unilateral weakness noted to RLE>RUE, particularly with DF/PF. See below for MMT results. No numbness or tingling reported at this time. No visual deficits noted, and patient tracking well bilat. Pt educated on BE FAST signs and symptoms and to be mindful of any furthur neurological episodes. Discussed principals of motor learning and promoted neuro plasticity principals, focusing on repetition and importance of continued movement during the recovery period. During evaluation, Pt demonstrating good initiation of movement and improved motor planning. Pt supine to sit EOB, mod assist. Pt needing min assist with sitting balance, tending to fall posteriorly and then over compensating with increased forward flexion. Pt sit to stand, mod assist x 1 without device. Initiated weight shifting and attempted stepping.  Pt able to bear weight through RLE but unable to clear the floor or step at this time. Pt stand pivot to bedside chair, max assist x 1. Recommend patient transfer to the strong side (left) and with assist x 2 people with nursing staff. Pt scored 3/56 on the Rice balance test, indicating high fall risk. Plan for next treatment includes forward gait training with chair follow and assist with forward motion of RLE and continued balance re-training. Pt will also benefit from LE exercise program. Call light within reach and chair alarm activated. Nursing notified. Strongly, recommend inpatient rehab at this time. Patient is working towards tolerating 3 hours of therapy each day and continues to be motivated to work with therapy every time. Pt previously ambulatory and independent out in the community. Recommend with nursing patient to complete as able in order to maintain strength, endurance and independence: OOB to chair 3x/day as appropriate with assist x 2. Thank you for your assistance. Patient will benefit from skilled intervention to address the above impairments.   Patients rehabilitation potential is considered to be Good  Factors which may influence rehabilitation potential include:   [ ]           None noted  [ ]           Mental ability/status  [X]           Medical condition  [ ]           Home/family situation and support systems  [X]           Safety awareness  [ ]           Pain tolerance/management  [ ]           Other:        PLAN :  Recommendations and Planned Interventions:  [X]             Bed Mobility Training             [ ]      Neuromuscular Re-Education  [X]             Transfer Training                   [ ]      Orthotic/Prosthetic Training  [X]             Gait Training                         [ ]      Modalities  [X]             Therapeutic Exercises           [ ]      Edema Management/Control  [X]             Therapeutic Activities            [ ]      Patient and Family Training/Education  [ ] Other (comment):  Frequency/Duration: Patient will be followed by physical therapy 6 times a week to address goals. Discharge Recommendations: Inpatient Rehab  Further Equipment Recommendations for Discharge: none       SUBJECTIVE:   Patient stated Everything is just happening at once.       OBJECTIVE DATA SUMMARY:   HISTORY:    Past Medical History:   Diagnosis Date    A-fib ([facility] 10/31/2014    Allergic rhinitis 1/13/2010    CLL (chronic lymphocytic leukemia) ([facility]      HTN (hypertension) 1/13/2010    Hypercholesterolemia     No past surgical history on file. Prior Level of Function/Home Situation: Previously patient independent with mobility without device. Pt driving and ambulating out in the community daily. Pt lives with family. Personal factors and/or comorbidities impacting plan of care:      Home Situation  Home Environment: Private residence  # Steps to Enter: 4  Rails to Enter: Yes  Hand Rails : Bilateral  Wheelchair Ramp: No  One/Two Story Residence: Two story  # of Interior Steps: 12  Interior Rails: Right  Lift Chair Available: No  Living Alone: No  Support Systems: Family member(s)  Patient Expects to be Discharged to[de-identified] Private residence  Current DME Used/Available at Home: None     EXAMINATION/PRESENTATION/DECISION MAKING:   Critical Behavior:  Neurologic State: Drowsy, Eyes open to voice, Lethargic  Orientation Level: Oriented to person, Oriented to place  Cognition: Decreased command following, Decreased attention/concentration, Recognition of people/places     Hearing:   Auditory  Auditory Impairment: None     Strength:    Strength: Grossly decreased, non-functional  RLE Strength  R Hip Flexion: 2+  R Hip ADduction: 3  R Knee Flexion: 3+  R Knee Extension: 3  R Ankle Dorsiflexion: 1  R Ankle Plantar Flexion: 0        LLE Strength  L Hip Flexion: 4  L Hip Extension: 4  L Hip ABduction: 4  L Hip ADduction: 4  L Knee Flexion: 4+  L Knee Extension: 4+  L Ankle Dorsiflexion: 4+  L Ankle Plantar Flexion: 4+  Tone & Sensation:   Tone: Normal     Sensation: Intact     Coordination:  Coordination: Grossly decreased, non-functional  Vision: WFL, good tracking bilat     Functional Mobility:  Bed Mobility:  Supine to Sit: Moderate assistance;Assist x1;Additional time  Transfers:  Sit to Stand: Moderate assistance;Assist x1;Additional time  Stand to Sit: Moderate assistance;Assist x1;Additional time  Stand Pivot Transfers: Maximum assistance     Bed to Chair: Assist x1;Maximum assistance; Additional time (recommend assist x 2 with nursing staff)  Balance:   Sitting: Impaired  Sitting - Static: Fair (occasional)  Sitting - Dynamic: Poor (constant support)  Standing: Impaired  Standing - Static: Poor  Standing - Dynamic : Poor     Functional Measure  Rice Balance Test:      Sitting to Standin  Standing Unsupported: 0  Sitting with Back Unsupported: 2  Standing to Sittin  Transfers: 1  Standing Unsupported with Eyes Closed: 0  Standing Unsupported with Feet Together: 0  Reach Forward with Outstretched Arm: 0   Object: 0  Turn to Look Over Shoulders: 0  Turn 360 Degrees: 0  Alternate Foot on Step/Stool: 0  Standing Unsupported One Foot in Front: 0  Stand on One Le  Total: 3             56=Maximum possible score;   0-20=High fall risk  21-40=Moderate fall risk   41-56=Low fall risk      Rice Balance Test and G-code impairment scale:  Percentage of Impairment CH     0%    CI     1-19% CJ     20-39% CK     40-59% CL     60-79% CM     80-99% CN      100%   Rice   Score 0-56 56 45-55 34-44 23-33 12-22 1-11 0         G codes: In compliance with CMSs Claims Based Outcome Reporting, the following G-code set was chosen for this patient based on their primary functional limitation being treated: The outcome measure chosen to determine the severity of the functional limitation was the Fitch with a score of 3/56 which was correlated with the impairment scale.       · Mobility - Walking and Moving Around:               - CURRENT STATUS:    CM - 80%-99% impaired, limited or restricted               - GOAL STATUS:           CL - 60%-79% impaired, limited or restricted               - D/C STATUS:                       ---------------To be determined---------------         Pain:  Pain Scale 1: Numeric (0 - 10)  Pain Intensity 1: 0     Activity Tolerance:   HR continues to fluctuate from 100-125, rhythm can be erratic with activity  Please refer to the flowsheet for vital signs taken during this treatment. After treatment:   [X]     Patient left in no apparent distress sitting up in chair  [ ]     Patient left in no apparent distress in bed  [X]     Call bell left within reach  [X]     Nursing notified  [ ]     Caregiver present  [X]     Bed alarm activated      COMMUNICATION/EDUCATION:   The patients plan of care was discussed with: Registered Nurse. Patient was educated regarding Her deficit(s) of R sided weakness as this relates to Her diagnosis of CVA. She demonstrated good understanding as evidenced by identifying weakness and functional limitations. Patient and/or family was verbally educated on the BE FAST acronym for signs/symptoms of CVA and TIA. BE FAST was written on patient's communication board  for visual education and reinforcement. All questions answered with patient indicating fair understanding.      [X]  Fall prevention education was provided and the patient/caregiver indicated understanding. [X]  Patient/family have participated as able in goal setting and plan of care. [X]  Patient/family agree to work toward stated goals and plan of care. [ ]  Patient understands intent and goals of therapy, but is neutral about his/her participation. [ ]  Patient is unable to participate in goal setting and plan of care.      Thank you for this referral.  Jaycee Meyer PT, DPT   Time Calculation: 24 mins

## 2017-07-02 NOTE — PROGRESS NOTES
Bedside shift change report given to [employer] (oncoming nurse) by Raj Hill rn (offgoing nurse). Report included the following information SBAR, Kardex, ED Summary, Intake/Output, Med Rec Status and Cardiac Rhythm A flutter .

## 2017-07-02 NOTE — PROGRESS NOTES
Hospitalist Progress Note  Issac Romo MD  Office: [phone]  Cell:       Date of Service:  2017  NAME:  Ana Rosa Vasquez  :  10/20/1932  MRN:  [id number]      Admission Summary:    The patient is an 80-year-old   [facility] female with past medical history of hypertension,   hypercholesterolemia, chronic lymphocytic leukemia, prior history of   atrial fibrillation,was not on any   anticoagulation, as well as previous history of stroke, who came to the   emergency room complaining of right lower extremity weakness x2   days. The patient reported that 2 days ago, she started having some   difficulties with walking because of right lower extremity weakness.              Interval history / Subjective:    Awake has no new complaints. Slightly slow to answer questions. Assessment & Plan:   1.  L > R embolic infarcts with foci of remote microhemmorhage. Continue current plan with Statin, ASA. 2 Atrial flutter on low dose Beta blocker seen by Cardiology increase dose to 50 mg as remains tachycardiac   Echo shows   Ejection  fraction was estimated in the range of 40 % to 45 %. There was severe  hypokinesis of the basal-mid anteroseptal and apical septal wall(s). There  was moderate concentric hypertrophy. She will need anticoagulation once [facility] with Neurology. 3. Hypertension: treatment per stroke guidelines SBP > 220 DBP > 120. Discussed with RN  4. History of CLL  5. Speech and PT consulted. Discussed with daughter she will like to discuss with case management about discharge planning and may need some help. Code Status : Full code but family will discuss with her today.     Care Plan discussed with: Nurse  Disposition: Home w/Family and TBD     Hospital Problems  Date Reviewed: 2017          Codes Class Noted POA    * (Principal)Cerebrovascular accident (CVA) due to thrombosis of left middle cerebral artery ([facility] ICD-10-CM: S85.390  ICD-9-CM: 434.01  6/30/2017 Yes        CLL [facility]chronic lymphocytic leukemia) [facility] ICD-10-CM: C91.10  ICD-9-CM: 204.10  Unknown Yes        CHF (congestive heart failure) (HCC) ICD-10-CM: I50.9  ICD-9-CM: 428.0  10/24/2014 Yes        HTN (hypertension) ICD-10-CM: I10  ICD-9-CM: 401.9  1/13/2010 Yes        Mixed hyperlipidemia ICD-10-CM: S65.6  ICD-9-CM: 272.2  1/13/2010 Yes                Review of Systems:   A comprehensive review of systems was negative except for that written in the HPI. Vital Signs:    Last 24hrs VS reviewed since prior progress note. Most recent are:  Visit Vitals    BP (!) 133/94 (BP 1 Location: Right arm, BP Patient Position: At rest)    Pulse 100    Temp 98.6 °F (37 °C)    Resp 21    Ht 5' 5\" (1.651 m)    Wt 58.8 kg (129 lb 10.1 oz)    SpO2 100%    Breastfeeding No    BMI 21.57 kg/m2         Intake/Output Summary (Last 24 hours) at 07/02/17 7104  Last data filed at 07/01/17 1127   Gross per 24 hour   Intake               60 ml   Output                0 ml   Net               60 ml        Physical Examination:             Constitutional:  No acute distress, cooperative, pleasant    ENT:  Oral mucous moist, oropharynx benign. Neck supple,    Resp:  CTA bilaterally. No wheezing/rhonchi/rales. No accessory muscle use   CV:  Regular rhythm, normal rate, no murmurs, gallops, rubs    GI:  Soft, non distended, non tender. normoactive bowel sounds, no hepatosplenomegaly     Musculoskeletal:  No edema, warm, 2+ pulses throughout    Neurologic:  Moves all extremities. AAOx3, CN II-XII reviewed     Psych:  Good insight, Not anxious nor agitated.        Data Review:    Review and/or order of clinical lab test      Labs:     Recent Labs      07/01/17   0248  06/30/17   0654   WBC  84.8*  75.3*   HGB  12.3  11.4*   HCT  38.6  35.7   PLT  166  174     Recent Labs      07/01/17   0248  06/30/17   0654   NA  132*  145   K  4.8  3.1*   CL  101  108   CO2  21  29   BUN  12  11   CREA  1.34* 1.16*   GLU  101*  92   CA  8.8  9.1   MG  2.0   --    PHOS  3.8   --      Recent Labs      06/30/17   0654   SGOT  76*   ALT  62   AP  87   TBILI  0.7   TP  7.2   ALB  3.6   GLOB  3.6     Recent Labs      06/30/17   0654  06/30/17   0651   INR  1.1  0.9   PTP  10.9   --    APTT  25.5   --       No results for input(s): FE, TIBC, PSAT, FERR in the last 72 hours. No results found for: FOL, RBCF   No results for input(s): PH, PCO2, PO2 in the last 72 hours.   Recent Labs      06/30/17   0654   TROIQ  <0.04     Lab Results   Component Value Date/Time    Cholesterol, total 145 07/01/2017 02:48 AM    HDL Cholesterol 60 07/01/2017 02:48 AM    LDL, calculated 72.8 07/01/2017 02:48 AM    Triglyceride 61 07/01/2017 02:48 AM    CHOL/HDL Ratio 2.4 07/01/2017 02:48 AM     Lab Results   Component Value Date/Time    Glucose (POC) 92 06/30/2017 06:49 AM    Glucose (POC) 90 11/02/2014 12:20 PM     Lab Results   Component Value Date/Time    Color YELLOW/STRAW 06/30/2017 08:28 AM    Appearance CLEAR 06/30/2017 08:28 AM    Specific gravity 1.014 06/30/2017 08:28 AM    pH (UA) 7.5 06/30/2017 08:28 AM    Protein NEGATIVE  06/30/2017 08:28 AM    Glucose NEGATIVE  06/30/2017 08:28 AM    Ketone NEGATIVE  06/30/2017 08:28 AM    Bilirubin NEGATIVE  06/30/2017 08:28 AM    Urobilinogen 0.2 06/30/2017 08:28 AM    Nitrites NEGATIVE  06/30/2017 08:28 AM    Leukocyte Esterase NEGATIVE  06/30/2017 08:28 AM    Epithelial cells FEW 06/30/2017 08:28 AM    Bacteria NEGATIVE  06/30/2017 08:28 AM    WBC 0-4 06/30/2017 08:28 AM    RBC 0-5 06/30/2017 08:28 AM         Medications Reviewed:     Current Facility-Administered Medications   Medication Dose Route Frequency    metoprolol succinate (TOPROL-XL) XL tablet 50 mg  50 mg Oral DAILY    hydrALAZINE (APRESOLINE) 20 mg/mL injection 10 mg  10 mg IntraVENous Q6H PRN    aspirin (ASPIRIN) tablet 325 mg  325 mg Oral DAILY    sodium chloride (NS) flush 5-10 mL  5-10 mL IntraVENous Q8H    sodium chloride (NS) flush 5-10 mL  5-10 mL IntraVENous PRN    acetaminophen (TYLENOL) tablet 650 mg  650 mg Oral Q4H PRN    Or    acetaminophen (TYLENOL) solution 650 mg  650 mg Per NG tube Q4H PRN    Or    acetaminophen (TYLENOL) suppository 650 mg  650 mg Rectal Q4H PRN    heparin (porcine) injection 5,000 Units  5,000 Units SubCUTAneous Q8H    atorvastatin (LIPITOR) tablet 20 mg  20 mg Oral DAILY    digoxin (LANOXIN) tablet 0.125 mg  0.125 mg Oral DAILY     ______________________________________________________________________  EXPECTED LENGTH OF STAY: - - -  ACTUAL LENGTH OF STAY:          2                 Fredy Pruitt MD

## 2017-07-03 LAB
ANION GAP BLD CALC-SCNC: 10 MMOL/L (ref 5–15)
BUN SERPL-MCNC: 36 MG/DL (ref 6–20)
BUN/CREAT SERPL: 19 (ref 12–20)
CALCIUM SERPL-MCNC: 8.6 MG/DL (ref 8.5–10.1)
CHLORIDE SERPL-SCNC: 100 MMOL/L (ref 97–108)
CO2 SERPL-SCNC: 23 MMOL/L (ref 21–32)
CREAT SERPL-MCNC: 1.94 MG/DL (ref 0.55–1.02)
ERYTHROCYTE [DISTWIDTH] IN BLOOD BY AUTOMATED COUNT: 14.8 % (ref 11.5–14.5)
GLUCOSE SERPL-MCNC: 88 MG/DL (ref 65–100)
HCT VFR BLD AUTO: 38.9 % (ref 35–47)
HGB BLD-MCNC: 12.6 G/DL (ref 11.5–16)
MCH RBC QN AUTO: 29 PG (ref 26–34)
MCHC RBC AUTO-ENTMCNC: 32.4 G/DL (ref 30–36.5)
MCV RBC AUTO: 89.6 FL (ref 80–99)
PLATELET # BLD AUTO: 177 K/UL (ref 150–400)
POTASSIUM SERPL-SCNC: 3.7 MMOL/L (ref 3.5–5.1)
RBC # BLD AUTO: 4.34 M/UL (ref 3.8–5.2)
SODIUM SERPL-SCNC: 133 MMOL/L (ref 136–145)
WBC # BLD AUTO: 94.5 K/UL (ref 3.6–11)

## 2017-07-03 PROCEDURE — 74011250637 HC RX REV CODE- 250/637: Performed by: HOSPITALIST

## 2017-07-03 PROCEDURE — 74011250637 HC RX REV CODE- 250/637: Performed by: INTERNAL MEDICINE

## 2017-07-03 PROCEDURE — G8988 SELF CARE GOAL STATUS: HCPCS

## 2017-07-03 PROCEDURE — 74011250637 HC RX REV CODE- 250/637: Performed by: PSYCHIATRY & NEUROLOGY

## 2017-07-03 PROCEDURE — G8987 SELF CARE CURRENT STATUS: HCPCS

## 2017-07-03 PROCEDURE — 97165 OT EVAL LOW COMPLEX 30 MIN: CPT

## 2017-07-03 PROCEDURE — 80048 BASIC METABOLIC PNL TOTAL CA: CPT | Performed by: HOSPITALIST

## 2017-07-03 PROCEDURE — G8997 SWALLOW GOAL STATUS: HCPCS | Performed by: SPEECH-LANGUAGE PATHOLOGIST

## 2017-07-03 PROCEDURE — 85027 COMPLETE CBC AUTOMATED: CPT | Performed by: HOSPITALIST

## 2017-07-03 PROCEDURE — 65660000000 HC RM CCU STEPDOWN

## 2017-07-03 PROCEDURE — 92526 ORAL FUNCTION THERAPY: CPT | Performed by: SPEECH-LANGUAGE PATHOLOGIST

## 2017-07-03 PROCEDURE — G8996 SWALLOW CURRENT STATUS: HCPCS | Performed by: SPEECH-LANGUAGE PATHOLOGIST

## 2017-07-03 PROCEDURE — 97530 THERAPEUTIC ACTIVITIES: CPT

## 2017-07-03 PROCEDURE — 36415 COLL VENOUS BLD VENIPUNCTURE: CPT | Performed by: HOSPITALIST

## 2017-07-03 PROCEDURE — 74011250636 HC RX REV CODE- 250/636: Performed by: INTERNAL MEDICINE

## 2017-07-03 RX ADMIN — Medication 10 ML: at 06:00

## 2017-07-03 RX ADMIN — DIGOXIN 0.12 MG: 125 TABLET ORAL at 08:47

## 2017-07-03 RX ADMIN — Medication 325 MG: at 08:47

## 2017-07-03 RX ADMIN — METOPROLOL SUCCINATE 50 MG: 50 TABLET, EXTENDED RELEASE ORAL at 08:47

## 2017-07-03 RX ADMIN — ATORVASTATIN CALCIUM 20 MG: 20 TABLET, FILM COATED ORAL at 08:47

## 2017-07-03 RX ADMIN — Medication 10 ML: at 18:39

## 2017-07-03 RX ADMIN — Medication 10 ML: at 22:14

## 2017-07-03 RX ADMIN — HEPARIN SODIUM 5000 UNITS: 5000 INJECTION, SOLUTION INTRAVENOUS; SUBCUTANEOUS at 18:39

## 2017-07-03 RX ADMIN — HEPARIN SODIUM 5000 UNITS: 5000 INJECTION, SOLUTION INTRAVENOUS; SUBCUTANEOUS at 02:52

## 2017-07-03 RX ADMIN — HEPARIN SODIUM 5000 UNITS: 5000 INJECTION, SOLUTION INTRAVENOUS; SUBCUTANEOUS at 12:15

## 2017-07-03 NOTE — PROGRESS NOTES
Problem: Ischemic Stroke: Day 2  Goal: Respiratory  Outcome: Progressing Towards Goal  Pt is 94-99% on RA lower lungs are coarse

## 2017-07-03 NOTE — PROGRESS NOTES
Care Management Interventions  PCP Verified by CM: Yes  Last Visit to PCP: 06/02/17  Palliative Care Consult (Criteria: CHF and RRAT>21): No  Reason for No Palliative Care Consult: Patient declined palliative services at this time  Mode of Transport at Discharge: S  Transition of Care Consult (CM Consult): Other (Inpatient/ Acute Rehab Facility)  MyChart Signup: No  Discharge Durable Medical Equipment: No  Physical Therapy Consult: Yes  Occupational Therapy Consult: Yes  Speech Therapy Consult: Yes  Current Support Network: Own Home  Confirm Follow Up Transport:  (Patient is expected to need ambulance transport.)  Plan discussed with Pt/Family/Caregiver: Yes  Freedom of Choice Offered: Yes  Discharge Location  Discharge Placement: Rehab hospital/unit acute     Met with patient's grand-dtr Ary Weaver - [phone]) at the bedside to discuss next steps and coordination of post acute care needs. Patient has been recommended for In-Patient Rehab r/t CVA. [place] of Choice letter signed and explained. Copy placed on the chart. Permission received to send clinical information to [address] [place] [address]. Will send referral via cube19. Patient has a Medicare Replacement Plan with [employer] and will require authorization prior to admission to [facility]. Ms. Cordelia Sharif is expected to need ambulance transport at time of d/c.    Education provided on Advanced Directives and POA designation. Will continue to assist with a seamless and safe transition to next level of care.     Anisha Huerta RN

## 2017-07-03 NOTE — PROGRESS NOTES
Problem: Self Care Deficits Care Plan (Adult)  Goal: *Acute Goals and Plan of Care (Insert Text)  Occupational Therapy Goals  Initiated 7/3/2017  1. Patient will perform upper body dressing with minimal assistance/contact guard assist within 7 day(s). 2. Patient will perform lower body dressing with moderate assistance within 7 day(s). 3. Patient will perform bathing with moderate assistance within 7 day(s). 4. Patient will perform toilet transfers with moderate assistance within 7 day(s). 5. Patient will perform all aspects of toileting with moderate assistance within 7 day(s). OCCUPATIONAL THERAPY EVALUATION  Patient: Laura Tate (24 y.o. female)  Date: 7/3/2017  Primary Diagnosis: Acute CVA (cerebrovascular accident) [facility])        Precautions: Seizure,  Fall, sbp<220, dbp<120      ASSESSMENT :  Based on the objective data described below, the patient presents with decreased strength, impaired sitting balance, impaired standing balance, decreased RUE fine motor skills, and decreased UE coordination s/p L CVA preventing her from engaging in ADLs safely and independently. Pt is Kelvin-maxA for ADLs, mod-maxA for functional transfers. Pt transferred supine>EOB mod A x2 for leg mgmt and trunk control d/t impaired balance and decreased strength; pt requiring modA while seated EOB d/t poor static sitting balance. Pt transferred EOB>chair maxA x1. Pt completed Fugl-Thayer with a score of 49/66 indicating mild UE impairment. Recommend rehab upon dc as pt is below functional baseline and will tolerate 3 hours of therapy. Next session: recommend ADLs seated in chair using RUE as gross motor assist, WBing through RUE, education on BEFAST     Patient will benefit from skilled intervention to address the above impairments.   Patients rehabilitation potential is considered to be Good  Factors which may influence rehabilitation potential include:   [ ]                None noted  [ ]                Mental ability/status  [X]                Medical condition  [ ]                Home/family situation and support systems  [ ]                Safety awareness  [ ]                Pain tolerance/management  [ ]                Other:        PLAN :  Recommendations and Planned Interventions:  [X]                  Self Care Training                  [X]           Therapeutic Activities  [X]                  Functional Mobility Training    [ ]           Cognitive Retraining  [X]                  Therapeutic Exercises           [X]           Endurance Activities  [X]                  Balance Training                   [X]           Neuromuscular Re-Education  [ ]                  Visual/Perceptual Training     [X]      Home Safety Training  [X]                  Patient Education                 [X]           Family Training/Education  [ ]                  Other (comment):     Frequency/Duration: Patient will be followed by occupational therapy 5 times a week to address goals. Discharge Recommendations: Rehab  Further Equipment Recommendations for Discharge: tbd at rehab       SUBJECTIVE:   Patient stated All I want to do is eat.       OBJECTIVE DATA SUMMARY:   HISTORY:   Past Medical History:   Diagnosis Date    [facility] 10/31/2014    Allergic rhinitis 1/13/2010    CLL (chronic lymphocytic leukemia) ([facility]      HTN (hypertension) 1/13/2010    Hypercholesterolemia     No past surgical history on file.      Prior Level of Function/Home Situation: independent at home with niece   Expanded or extensive additional review of patient history:      Home Situation  Home Environment: Private residence  # Steps to Enter: 4  Rails to Enter: Yes  Hand Rails : Bilateral  Wheelchair Ramp: No  One/Two Story Residence: Two story  # of Interior Steps: 15  Interior Rails: Right  Lift Chair Available: No  Living Alone: No  Support Systems: Family member(s)  Patient Expects to be Discharged to[de-identified] Private residence  Current DME Used/Available at Home: None  Tub or Shower Type: Tub/Shower combination  [X]  Right hand dominant             [ ]  Left hand dominant     EXAMINATION OF PERFORMANCE DEFICITS:  Cognitive/Behavioral Status:  Neurologic State: Alert  Orientation Level: Oriented X4  Cognition: Follows commands;Decreased attention/concentration;Decreased command following  Perception: Appears intact  Perseveration: No perseveration noted  Safety/Judgement: Awareness of environment     Skin: appears intact     Edema: no UE edema noted     Hearing: Auditory  Auditory Impairment: None     Vision/Perceptual:    Tracking: Able to track stimulus in all quadrants w/o difficulty                      Acuity: Within Defined Limits          Range of Motion:  AROM: Grossly decreased, non-functional ((R UE, L WFL))  PROM: Generally decreased, functional                       Strength:  Strength: Grossly decreased, non-functional (both L and R UE)                 Coordination:  Coordination: Grossly decreased, non-functional  Fine Motor Skills-Upper: Left Impaired;Right Intact    Gross Motor Skills-Upper: Left Impaired;Right Impaired     Tone & Sensation:  Tone: Normal  Sensation: Intact                       Balance:  Sitting: Impaired; With support  Sitting - Static: Poor (constant support)  Sitting - Dynamic: Poor (constant support)  Standing: Impaired; With support  Standing - Static: Poor  Standing - Dynamic : Poor     Functional Mobility and Transfers for ADLs:  Bed Mobility:  Supine to Sit: Moderate assistance;Assist x2  Sit to Supine:  (NT- remained in chair)  Scooting: Moderate assistance     Transfers:  Sit to Stand: Maximum assistance;Assist x1  Functional Transfers  Toilet Transfer : Maximum assistance; Additional time     ADL Assessment:  Feeding: Minimum assistance     Oral Facial Hygiene/Grooming: Minimum assistance     Bathing: Maximum assistance     Upper Body Dressing:  Moderate assistance     Lower Body Dressing: Maximum assistance     Toileting: Maximum assistance                 ADL Intervention and task modifications:                                            Cognitive Retraining  Safety/Judgement: Awareness of environment        Functional Measure:   Fugl-Thayer Assessment of Motor Recovery after Stroke:       Reflex Activity  Flexors/Biceps/Fingers: Can be elicited  Extensors/Triceps: Can be elicited  Reflex Subtotal: 4     Volitional Movement Within Synergies  Shoulder Retraction: Full  Shoulder Elevation: Full  Shoulder Abduction (90 degrees): Partial  Shoulder External Rotation: Full  Elbow Flexion: Full  Forearm Supination: Full  Shoulder Adduction/Internal Rotation: Full  Elbow Extension: Full  Forearm Pronation: Full  Subtotal: 17     Volitional Movement Mixing Synergies  Hand to Lumbar Spine: Partial  Shoulder Flexion (0-90 degrees): Partial  Pronation-Supination: Partial  Subtotal: 3     Volitional Movement With Little or No Synergy  Shoulder Abduction (0-90 degrees): Partial  Shoulder Flexion ( degrees): Partial  Pronation/Supination: Partial  Subtotal : 3     Normal Reflex Activity  Biceps, Triceps, Finger Flexors: Full  Subtotal : 2     Upper Extremity Total   Upper Extremity Total: 29     Wrist  Stability at 15 Degree Dorsiflexion: None  Repeated Dorsiflexion/ Volar Flexion: Partial  Stability at 15 Degree Dorsiflexion: None  Repeated Dorsiflexion/ Volar Flexion: Partial  Circumduction: Partial  Wrist Total: 3     Hand  Mass Flexion: Full  Mass Extension: Full  Grasp A: Partial  Grasp B: Full  Grasp C: Full  Grasp D: Full  Grasp E: Full  Hand Total: 13     Coordination/Speed  Tremor: None  Dysmetria: Slight  Time: 2-5s  Coordination/Speed Total : 4     Total A-D  Total A-D (Motor Function): 49/66      Percentage of impairment CH  0% CI  1-19% CJ  20-39% CK  40-59% CL  60-79% CM  80-99% CN  100%   Fugl-Thayer score: 0-66 66 53-65 39-52 26-38 13-25 1-12    0      This is a reliable/valid measure of arm function after a neurological event. It has established value to characterize functional status and for measuring spontaneous and therapy-induced recovery; tests proximal and distal motor functions. Fugl-Thayer Assessment  UE scores recorded between five and 30 days post neurologic event can be used to predict UE recovery at six months post neurologic event. Severe = 0-21 points   Moderately Severe = 22-33 points   Moderate = 34-47 points   Mild = 48-66 points  KRISTIN Schultz, AUNDREA Nugent, & MIL Zepeda (1992). Measurement of motor recovery after stroke: Outcome assessment and sample size requirements. Stroke, 23, pp. 3308-4804.   ------------------------------------------------------------------------------------------------------------------------------------------------------------------  MCID:  Stroke:   Chilango Banegas et al, 2001; n = 171; mean age 79 (5) years; assessed within 16 (12) days of stroke, Acute Stroke)  FMA Motor Scores from Admission to Discharge   · 10 point increase in FMA Upper Extremity = 1.5 change in discharge FIM  · 10 point increase in FMA Lower Extremity = 1.9 change in discharge FIM  MDC:   Stroke:   Aurora Amado et al, 2008, n = 14, mean age = 59.9 (14.6) years, assessed on average 14 (6.5) months post stroke, Chronic Stroke)   · FMA = 5.2 points for the Upper Extremity portion of the assessment      G codes: In compliance with CMSs Claims Based Outcome Reporting, the following G-code set was chosen for this patient based on their primary functional limitation being treated: The outcome measure chosen to determine the severity of the functional limitation was the Fugl-Thayer with a score of 49/66 which was correlated with the impairment scale.       · Self Care:               - CURRENT STATUS:    CJ - 20%-39% impaired, limited or restricted               - GOAL STATUS:           CI - 1%-19% impaired, limited or restricted               - D/C STATUS:                       ---------------To be determined---------------       Occupational Therapy Evaluation Charge Determination   History Examination Decision-Making   LOW Complexity : Brief history review  LOW Complexity : 1-3 performance deficits relating to physical, cognitive , or psychosocial skils that result in activity limitations and / or participation restrictions  LOW Complexity : No comorbidities that affect functional and no verbal or physical assistance needed to complete eval tasks       Based on the above components, the patient evaluation is determined to be of the following complexity level: LOW      Pain:  Pain Scale 1: Numeric (0 - 10)  Pain Intensity 1: 0              Activity Tolerance:   VSS, good tolerance  Please refer to the flowsheet for vital signs taken during this treatment. After treatment:   [X]  Patient left in no apparent distress sitting up in chair  [ ]  Patient left in no apparent distress in bed  [X]  Call bell left within reach  [X]  Nursing notified  [X]  Caregiver present  [ ]  Bed alarm activated      COMMUNICATION/EDUCATION:   The patients plan of care was discussed with: Physical Therapist and Registered Nurse. Patient was educated regarding Her deficit(s) of impaired balance, decreased strength, decreased coordination, decreased fine motor skills as this relates to Her diagnosis of L CVA. She demonstrated Good understanding as evidenced by verbalization. [X]      Home safety education was provided and the patient/caregiver indicated understanding. [X]      Patient/family have participated as able and agree with findings and recommendations. [ ]      Patient is unable to participate in plan of care at this time. This patients plan of care is appropriate for delegation to [facility]. Thank you for this referral.  Ramses Irene        Regarding student involvement in patient care:  A student participated in this treatment session.  Per CMS Medicare statements and AOTA guidelines I certify that the following was true:  1. I was present and directly observed the entire session. 2. I made all skilled judgments and clinical decisions regarding care. 3. I am the practitioner responsible for assessment, treatment, and documentation.       Vidal Bryant OT

## 2017-07-03 NOTE — PROGRESS NOTES
7/3/2017     Admit Date: 6/30/2017    Admit Diagnosis: Acute CVA (cerebrovascular accident) [facility])    Principal Problem:    Cerebrovascular accident (CVA) due to thrombosis of left middle cerebral artery ([facility] (6/30/2017)    Active Problems:    HTN (hypertension) (1/13/2010)      Mixed hyperlipidemia (1/13/2010)      [facility]est[facility] (10/24/2014)      CLL (chronic lymphocytic leukemia) ([facility] ()        Assessment/Plan:  1. Atrial flutter: has achieved good rate control  2. Recovering from embolic CVA  3. Agree with stopping digoxin  4. [address] when cleared by neurology     Subjective:  Feels ok; remains a bit aphasic and dysarthric       Genie Smith denies chest pain, chest pressure/discomfort, palpitations, orthopnea, paroxysmal nocturnal dyspnea. Objective:     Visit Vitals    /73 (BP 1 Location: Left arm, BP Patient Position: Sitting)    Pulse 88    Temp 97.1 °F (36.2 °C)    Resp 23    Ht 5' 5\" (1.651 m)  Comment: drivers license    Wt 03.2 kg (127 lb 10.3 oz)    SpO2 96%    Breastfeeding No    BMI 21.24 kg/m2        Physical Exam:  Neck: supple, symmetrical, trachea midline, no adenopathy, thyroid: not enlarged, symmetric, no tenderness/mass/nodules, no carotid bruit and no JVD  Heart: irregularly irregular rhythm, S1, S2 normal  Lungs: clear to auscultation bilaterally, normal percussion bilaterally  Abdomen: soft, non-tender. Bowel sounds normal. No masses,  no organomegaly  Extremities: extremities normal, atraumatic, no cyanosis or edema  Pulses: 2+ and symmetric  Neurologic: Mental status: Alert, oriented, thought content appropriate  Motor:RLE weakness      Labs:    No results for input(s): CPK, CKMB, TROIQ in the last 72 hours.     No lab exists for component: CKQMB, CPKMB  Recent Labs      07/03/17   0245  07/01/17   0248   NA  133*  132*   K  3.7  4.8   CL  100  101   BUN  36*  12   CREA  1.94*  1.34*   GLU  88  101*   PHOS   --   3.8   CA  8.6  8.8     Recent Labs 07/03/17   0245   WBC  94.5*   HGB  12.6   HCT  38.9   PLT  177     Recent Labs      07/01/17   0248   CHOL  145   LDLC  72.8       Telemetry: atrial flutter     Data Review: current meds, labs,recent radiology, intake/output/weight and problem list reviewed

## 2017-07-03 NOTE — PROGRESS NOTES
Bedside and Verbal shift change report given to Mohini Iqbal (oncoming nurse) by Anirudh terrazas (offgoing nurse). Report included the following information SBAR, Kardex, ED Summary, MAR and Cardiac Rhythm A flutter.

## 2017-07-03 NOTE — PROGRESS NOTES
Problem: Dysphagia (Adult)  Goal: *Acute Goals and Plan of Care (Insert Text)  Speech Pathology  Initiated 7/3/17  1. Patient will tolerate dental soft diet/thin liquids without overt s/s of aspiration within 7 days  2. Patient will utilize swallow strategies (alternate liquids/solids, tongue sweep) independently within 7 days   [address] RE-ASSESSMENT/TREATMENT  Patient: Mehdi Khan (19 y.o. female)  Date: 7/3/2017  Primary Diagnosis: Acute CVA (cerebrovascular accident) [facility])        Precautions:   Fall      ASSESSMENT :  Asked to re-assess patient's swallowing after right sided pocketing noted over the weekend and patient made NPO. Swallow eval on Friday (6/30) revealed functional swallow with recommendations for regular diet and no skilled dysphagia tx. Patient presents with mild right sided facial weakness and suspected lingual weakness leading to prolonged manipulation with purees and mastication with solids. Mild-moderate right sided pocketing noted which is most significant with solids. Patient was able to clear with tongue sweep and sips of liquid. Pharyngeal swallow suspected to be intact and functional with no overt s/s of aspiration observed. At this time, recommend dental soft diet/thin liquids with alternating liquids/solids and tongue sweep with all bites of food. Given right sided UE weakness, recommend close supervision at meals for assistance feeding. Patient will benefit from skilled intervention to address the above impairments.   Patients rehabilitation potential is considered to be Excellent  Factors which may influence rehabilitation potential include:   [ ]            None noted  [ ]            Mental ability/status  [ ]            Medical condition  [ ]            Home/family situation and support systems  [ ]            Safety awareness  [ ]            Pain tolerance/management  [ ]            Other:        PLAN :  Recommendations and Planned Interventions:  1. Dental soft/thin liquids  2. Straws OK  3. Alternate liquids/solids  4. Tongue sweep to clear oral residue  5. Safe swallowing strategies (upright for all PO, small bites/sips, slow rate)  6. Distant supervision for assistance feeding as needed     Frequency/Duration: Patient will be followed by speech-language pathology 2 times a week to address goals. Discharge Recommendations: To Be Determined       SUBJECTIVE:   Patient stated Don't say that word! \" Patient hungry and eager for swallow re-assessment. NAD. Granddaughter bedside and reports pocketing over the weekend which was concerning to nursing staff.        OBJECTIVE:       Past Medical History:   Diagnosis Date    A-fib ([facility] [address] 10/31/2014    Allergic rhinitis 1/13/2010    CLL (chronic lymphocytic leukemia) (HCC)      HTN (hypertension) 1/13/2010    Hypercholesterolemia       Prior Level of Function/Home Situation:   Home Situation  Home Environment: Private residence  # Steps to Enter: 4  Rails to Enter: Yes  Hand Rails : Bilateral  Wheelchair Ramp: No  One/Two Story Residence: Two story  # of Interior Steps: 15  Interior Rails: Right  Lift Chair Available: No  Living Alone: No  Support Systems: Family member(s)  Patient Expects to be Discharged to[de-identified] Private residence  Current DME Used/Available at Home: None  Tub or Shower Type: Shower      Diet prior to admission: Regular diet/thin liquids  Current Diet:  NPO     Cognitive and Communication Status:  Neurologic State: Alert  Orientation Level: Oriented X4  Cognition: Follows commands  Perception: Appears intact  Perseveration: No perseveration noted  Safety/Judgement: Decreased awareness of need for assistance, Decreased awareness of need for safety, Awareness of environment      Oral Assessment:  Oral Assessment  Labial: Right droop (mild)  Dentition: Upper & lower dentures  Oral Hygiene: dry oral mucosa  Lingual: Decreased rate  Velum: No impairment  Mandible: No impairment  P.O. Trials:  Patient Position: Upright in chair  Vocal quality prior to P.O.: No impairment  Consistency Presented: Puree; Solid; Thin liquid  How Presented: Self-fed/presented;Cup/sip;Spoon;Straw;Successive swallows     Bolus Acceptance: No impairment  Bolus Formation/Control: Impaired  Type of Impairment: Delayed;Mastication  Propulsion: Delayed (# of seconds)  Oral Residue: Right;Pocketing;10-50% of bolus  Initiation of Swallow: No impairment  Laryngeal Elevation: Functional  Aspiration Signs/Symptoms: None  Pharyngeal Phase Characteristics: No impairment, issues, or problems   Effective Modifications: Double swallow; Alternate liquids/solids; Other (comment) (cued tongue sweep to clear pocketed material)  Cues for Modifications: Minimal     Oral Phase Severity: Mild-moderate  Pharyngeal Phase Severity : No impairment     NOMS:   The NOMS functional outcome measure was used to quantify this patient's level of swallowing impairment. Based on the NOMS, the patient was determined to be at level 5 for swallow function      G Codes: In compliance with CMSs Claims Based Outcome Reporting, the following G-code set was chosen for this patient based the use of the NOMS functional outcome to quantify this patient's level of swallowing impairment. Using the NOMS, the patient was determined to be at level 5 for swallow function which correlates with the CL= 60-79% level of severity. Based on the objective assessment provided within this note, the current, goal, and discharge g-codes are as follows:     Swallow  Swallowing:   Swallow Current Status CL= 60-79%   Swallow Goal Status CH= 0%         NOMS Swallowing Levels:  Level 1 (CN): NPO  Level 2 (CM): NPO but takes consistency in therapy  Level 3 (CL): Takes less than 50% of nutrition p.o. and continues with nonoral feedings; and/or safe with mod cues; and/or max diet restriction  Level 4 (CK):  Safe swallow but needs mod cues; and/or mod diet restriction; and/or still requires some nonoral feeding/supplements  Level 5 (CJ): Safe swallow with min diet restriction; and/or needs min cues  Level 6 (CI): Independent with p.o.; rare cues; usually self cues; may need to avoid some foods or needs extra time  Level 7 ([address] [place] [address]): Independent for all p.o.  OSMIN. (2003). National Outcomes Measurement System (NOMS): Adult Speech-Language Pathology User's Guide. Pain:  Pain Scale 1: Numeric (0 - 10)  Pain Intensity 1: 0     After treatment:   [X]            Patient left in no apparent distress sitting up in chair  [ ]            Patient left in no apparent distress in bed  [X]            Call bell left within reach  [X]            Nursing notified  [X]            Caregiver present  [ ]            Bed alarm activated      COMMUNICATION/EDUCATION:   The patients plan of care including recommendations, planned interventions, and recommended diet changes were discussed with: Registered Nurse. Patient was educated regarding Her deficit(s) of dysphagia as this relates to Her diagnosis of CVA. She demonstrated Good understanding as evidenced by verbalization. [X]            Posted safety precautions in patient's room. [X]            Patient/family have participated as able in goal setting and plan of care. [X]            Patient/family agree to work toward stated goals and plan of care. [ ]            Patient understands intent and goals of therapy, but is neutral about his/her participation. [ ]            Patient is unable to participate in goal setting and plan of care. Thank you for this referral.  Solitario Perez.  Rock Riggins MS, CCC-SLP, BCS-S  Time Calculation: 15 mins

## 2017-07-03 NOTE — ROUTINE PROCESS
Bedside shift change report given to Laurie Carroll (oncoming nurse) by Adrienne Ching (offgoing nurse). Report included the following information SBAR, Kardex, Recent Results and Cardiac Rhythm Afib.

## 2017-07-03 NOTE — PROGRESS NOTES
Hospitalist Progress Note  Mayco Varner MD  Office: [phone]  Cell:       Date of Service:  7/3/2017  NAME:  Jhon Abraham  :  10/20/1932  MRN:  [id number]      Admission Summary:    The patient is an 80-year-old   [facility] American female with past medical history of hypertension,   hypercholesterolemia, chronic lymphocytic leukemia, prior history of   atrial fibrillation,was not on any   anticoagulation, as well as previous history of stroke, who came to the   emergency room complaining of right lower extremity weakness x2   days. The patient reported that 2 days ago, she started having some   difficulties with walking because of right lower extremity weakness.              Interval history / Subjective:    Awake has no new complaints. Answers all the questions. Assessment & Plan:   1.  L > R embolic infarcts with foci of remote microhemmorhage. Continue current plan with Statin, ASA. 2 Atrial flutter on low dose Beta blocker heart rate controlled since yesterday. Consider stopping DIG in an elderly patient with elevated creatinine if ok with Cardiology. I have ordered Dig level. Echo shows   Ejection  fraction was estimated in the range of 40 % to 45 %. There was severe  hypokinesis of the basal-mid anteroseptal and apical septal wall(s). There  was moderate concentric hypertrophy. She will need anticoagulation once Monica Jason with Neurology. 3. Hypertension: treatment per stroke guidelines SBP > 220 DBP > 120. Discussed with RN  4. History of CLL  5. Speech and PT consulted. Discussed with daughter she will like to discuss with case management about discharge planning and may need some help. 6. Speech consult pending start feeding today if cleared. 7. Slightly elevated creatinine will increase IV fluids and monitor. Code Status : Full code but family will discuss with her today.     Care Plan discussed with: Nurse  Disposition: Home w/Family and TBD     Hospital Problems  Date Reviewed: 6/30/2017          Codes Class Noted POA    * (Principal)Cerebrovascular accident (CVA) due to thrombosis of left middle cerebral artery ([facility] ICD-10-CM: C28.521  ICD-9-CM: 434.01  6/30/2017 Yes        CLL (chronic lymphocytic leukemia) ([facility] ICD-10-CM: C91.10  ICD-9-CM: 204.10  Unknown Yes        CHF (congestive heart failure) ([facility] ICD-10-CM: I50.9  ICD-9-CM: 428.0  10/24/2014 Yes        HTN (hypertension) ICD-10-CM: I10  ICD-9-CM: 401.9  1/13/2010 Yes        Mixed hyperlipidemia ICD-10-CM: L43.8  ICD-9-CM: 272.2  1/13/2010 Yes                Review of Systems:   A comprehensive review of systems was negative except for that written in the HPI. Vital Signs:    Last 24hrs VS reviewed since prior progress note. Most recent are:  Visit Vitals    /75 (BP 1 Location: Right arm, BP Patient Position: At rest)    Pulse 85    Temp 98.9 °F (37.2 °C)    Resp 20    Ht 5' 5\" (1.651 m)    Wt 57.9 kg (127 lb 10.3 oz)    SpO2 99%    Breastfeeding No    BMI 21.24 kg/m2         Intake/Output Summary (Last 24 hours) at 07/03/17 0752  Last data filed at 07/03/17 0400   Gross per 24 hour   Intake              643 ml   Output                0 ml   Net              643 ml        Physical Examination:             Constitutional:  No acute distress, cooperative, pleasant    ENT:  Oral mucous moist, oropharynx benign. Neck supple,    Resp:  CTA bilaterally. No wheezing/rhonchi/rales. No accessory muscle use   CV:  Regular rhythm, normal rate, no murmurs, gallops, rubs    GI:  Soft, non distended, non tender. normoactive bowel sounds, no hepatosplenomegaly     Musculoskeletal:  No edema, warm, 2+ pulses throughout    Neurologic:  Moves all extremities. AAOx3, CN II-XII reviewed     Psych:  Good insight, Not anxious nor agitated.        Data Review:    Review and/or order of clinical lab test      Labs:     Recent Labs      07/03/17   0245  07/01/17   0248   WBC 94.5*  84.8*   HGB  12.6  12.3   HCT  38.9  38.6   PLT  177  166     Recent Labs      07/03/17   0245  07/01/17   0248   NA  133*  132*   K  3.7  4.8   CL  100  101   CO2  23  21   BUN  36*  12   CREA  1.94*  1.34*   GLU  88  101*   CA  8.6  8.8   MG   --   2.0   PHOS   --   3.8     No results for input(s): SGOT, GPT, ALT, AP, TBIL, TBILI, TP, ALB, GLOB, GGT, AML, LPSE in the last 72 hours. No lab exists for component: AMYP, HLPSE  No results for input(s): INR, PTP, APTT in the last 72 hours. No lab exists for component: INREXT, INREXT   No results for input(s): FE, TIBC, PSAT, FERR in the last 72 hours. No results found for: FOL, RBCF   No results for input(s): PH, PCO2, PO2 in the last 72 hours. No results for input(s): CPK, CKNDX, TROIQ in the last 72 hours.     No lab exists for component: CPKMB  Lab Results   Component Value Date/Time    Cholesterol, total 145 07/01/2017 02:48 AM    HDL Cholesterol 60 07/01/2017 02:48 AM    LDL, calculated 72.8 07/01/2017 02:48 AM    Triglyceride 61 07/01/2017 02:48 AM    CHOL/HDL Ratio 2.4 07/01/2017 02:48 AM     Lab Results   Component Value Date/Time    Glucose (POC) 92 06/30/2017 06:49 AM    Glucose (POC) 90 11/02/2014 12:20 PM     Lab Results   Component Value Date/Time    Color YELLOW/STRAW 06/30/2017 08:28 AM    Appearance CLEAR 06/30/2017 08:28 AM    Specific gravity 1.014 06/30/2017 08:28 AM    pH (UA) 7.5 06/30/2017 08:28 AM    Protein NEGATIVE  06/30/2017 08:28 AM    Glucose NEGATIVE  06/30/2017 08:28 AM    Ketone NEGATIVE  06/30/2017 08:28 AM    Bilirubin NEGATIVE  06/30/2017 08:28 AM    Urobilinogen 0.2 06/30/2017 08:28 AM    Nitrites NEGATIVE  06/30/2017 08:28 AM    Leukocyte Esterase NEGATIVE  06/30/2017 08:28 AM    Epithelial cells FEW 06/30/2017 08:28 AM    Bacteria NEGATIVE  06/30/2017 08:28 AM    WBC 0-4 06/30/2017 08:28 AM    RBC 0-5 06/30/2017 08:28 AM         Medications Reviewed:     Current Facility-Administered Medications   Medication Dose Route Frequency    metoprolol succinate (TOPROL-XL) XL tablet 50 mg  50 mg Oral DAILY    0.9% sodium chloride infusion  30 mL/hr IntraVENous CONTINUOUS    hydrALAZINE (APRESOLINE) 20 mg/mL injection 10 mg  10 mg IntraVENous Q6H PRN    aspirin (ASPIRIN) tablet 325 mg  325 mg Oral DAILY    sodium chloride (NS) flush 5-10 mL  5-10 mL IntraVENous Q8H    sodium chloride (NS) flush 5-10 mL  5-10 mL IntraVENous PRN    acetaminophen (TYLENOL) tablet 650 mg  650 mg Oral Q4H PRN    Or    acetaminophen (TYLENOL) solution 650 mg  650 mg Per NG tube Q4H PRN    Or    acetaminophen (TYLENOL) suppository 650 mg  650 mg Rectal Q4H PRN    heparin (porcine) injection 5,000 Units  5,000 Units SubCUTAneous Q8H    atorvastatin (LIPITOR) tablet 20 mg  20 mg Oral DAILY    digoxin (LANOXIN) tablet 0.125 mg  0.125 mg Oral DAILY     ______________________________________________________________________  EXPECTED LENGTH OF STAY: - - -  ACTUAL LENGTH OF STAY:          3                 Arvind Silva MD

## 2017-07-03 NOTE — CDMP QUERY
Please clarify if this patient is being treated/managed for:    =>Severe Protein Calorie Malnutrition in the setting of CVA treated with nutrition consult    =>Other Explanation of clinical findings  =>Unable to Determine (no explanation of clinical findings)    The medical record reflects the following clinical findings, treatment, and risk factors:    Risk Factors: 85yo s/p CVA    Clinical Indicators: BMI: 21.24 5'5\" 127LB  6/30 NUTRITION  PN:   Meets Criteria for Chronic Malnutrition   Severe Malnutrition, as evidenced by:                         Severe muscle wasting, loss of subcutaneous fat                         Nutritional intake of <75% of recommended intake for >1 month    Treatment: Nutrition consult    Please clarify and document your clinical opinion in the progress notes and discharge summary including the definitive and/or presumptive diagnosis, (suspected or probable), related to the above clinical findings. Please include clinical findings supporting your diagnosis.     Thank Tonny Barlow [facility]  [phone]

## 2017-07-03 NOTE — PROGRESS NOTES
07/03/17 1100   Vital Signs   Temp 98 °F (36.7 °C)   Temp Source Oral   Pulse (Heart Rate) 91   Heart Rate Source Monitor   Cardiac Rhythm A Fib;A Flutter   Resp Rate 21   O2 Sat (%) 98 %   Level of Consciousness Alert   /67   MAP (Calculated) 78   BP 1 Method Automatic   BP 1 Location Right arm   BP Patient Position Sitting   MEWS Score 3     Continue to monitor BP. Patient sitting up in chair. No s/s of distress.

## 2017-07-03 NOTE — PROGRESS NOTES
Problem: Ischemic Stroke: Day 2  Goal: Nutrition/Diet  Outcome: Not Progressing Towards Goal  Pt NPO until SLP evalulation

## 2017-07-03 NOTE — PROGRESS NOTES
Problem: Mobility Impaired (Adult and Pediatric)  Goal: *Acute Goals and Plan of Care (Insert Text)  Physical Therapy Goals  Initiated 7/2/2017  1. Patient will move from supine to sit and sit to supine in bed with minimal assistance within 7 day(s). 2. Patient will transfer from bed to chair and chair to bed with moderate assistance using the least restrictive device within 7 day(s). 3. Patient will perform sit to stand with minimal assistance within 7 day(s). 4. Patient will ambulate with moderate assistance for 15 feet with the least restrictive device within 7 day(s). 5. Pt will increase Rice Balance score by 4-5 points to decrease risk of falls within 7 days. PHYSICAL THERAPY TREATMENT  Patient: Thalia Lew (35 y.o. female)  Date: 7/3/2017  Diagnosis: Acute CVA (cerebrovascular accident) [facility]) Cerebrovascular accident (CVA) due to thrombosis of left middle cerebral artery [facility])       Precautions: Fall      ASSESSMENT:  Patient cleared for mobility and was received in bed with family member present. Patient was agreeable to participate and agreed with plan to sit in chair following session. SPT observed fair AROM in BUE and LLE, however patient demonstrated no-minimal AROM in RLE. BP initially in supine was 80s/60s however following brief LE exercises BP increased slowly to 90s/70s and then 110s/70s with more activity. Patient transferred from supine to sitting at EOB with Mod A and maximal verbal cues. When sitting at EOB, patient leaned posteriorly ~75% on the total time, requiring Max A to maintain balance. When instructed to sit upright, patient was able to self correct however fatigued quickly and began leaning posteriorly again. Patient stood with Max A x1 and additional time. SPT noted RLE buckling in standing as well as attempted side steps to [facility]. Due to patient's weakness and brief elevated heart rate with minimal activity, SPT deferred further OOB mobility and instructed patient to sit. In sitting, patient scooted laterally to [facility] with Max A x1 and ended session transferring to supine with Mod-Max A. Patient ended session with all needs placed within reach. PT recommending patient OOB in chair x3/day for all meals with safety recommendation of transferring out of bed to chair on same side. Progression toward goals:  [ ]    Improving appropriately and progressing toward goals  [X]    Improving slowly and progressing toward goals  [ ]    Not making progress toward goals and plan of care will be adjusted       PLAN:  Patient continues to benefit from skilled intervention to address the above impairments. Continue treatment per established plan of care. Discharge Recommendations:  Rehab  Further Equipment Recommendations for Discharge:  TBD       SUBJECTIVE:   Patient stated Im feeling fine honey      OBJECTIVE DATA SUMMARY:   Critical Behavior:  Neurologic State: Alert  Orientation Level: Oriented X4  Cognition: Follows commands, Decreased attention/concentration, Decreased command following  Safety/Judgement: Awareness of environment  Functional Mobility Training:  Bed Mobility:     Supine to Sit: Moderate assistance;Assist x2  Sit to Supine:  (NT- remained in chair)  Scooting: Moderate assistance        Transfers:  Sit to Stand: Maximum assistance;Assist x1  Stand to Sit: Moderate assistance;Assist x1        Bed to Chair: Maximum assistance;Assist x1;Additional time                    Balance:  Sitting: Impaired; With support  Sitting - Static: Poor (constant support); Fair (occasional)  Sitting - Dynamic: Poor (constant support)  Standing: Impaired; With support  Standing - Static: Poor  Standing - Dynamic : Poor  Ambulation/Gait Training:                                                                   Stairs:                       Neuro Re-Education:     Therapeutic Exercises:    Ankle pumps and heel slides x 10 each in attempt to increase BP     Pain:  Pain Scale 1: Numeric (0 - 10)  Pain Intensity 1: 0              Activity Tolerance:   Poor-Fair, patient became mildly symptomatic with OOB mobility, patient did recover within ~ 2 minutes of rest at EOB  Please refer to the flowsheet for vital signs taken during this treatment.   After treatment:   [ ]    Patient left in no apparent distress sitting up in chair  [X]    Patient left in no apparent distress in bed  [X]    Call bell left within reach  [X]    Nursing notified  [X]    Caregiver present  [X]    Bed alarm activated      COMMUNICATION/COLLABORATION:   The patients plan of care was discussed with: Registered Nurse     Lucía Montez   Time Calculation: 18 mins

## 2017-07-04 LAB
ANION GAP BLD CALC-SCNC: 9 MMOL/L (ref 5–15)
BUN SERPL-MCNC: 41 MG/DL (ref 6–20)
BUN/CREAT SERPL: 24 (ref 12–20)
CALCIUM SERPL-MCNC: 8 MG/DL (ref 8.5–10.1)
CHLORIDE SERPL-SCNC: 104 MMOL/L (ref 97–108)
CO2 SERPL-SCNC: 23 MMOL/L (ref 21–32)
CREAT SERPL-MCNC: 1.72 MG/DL (ref 0.55–1.02)
DIGOXIN SERPL-MCNC: 1.2 NG/ML (ref 0.9–2)
ERYTHROCYTE [DISTWIDTH] IN BLOOD BY AUTOMATED COUNT: 14.8 % (ref 11.5–14.5)
GLUCOSE SERPL-MCNC: 85 MG/DL (ref 65–100)
HCT VFR BLD AUTO: 35.6 % (ref 35–47)
HGB BLD-MCNC: 11.4 G/DL (ref 11.5–16)
MCH RBC QN AUTO: 28.6 PG (ref 26–34)
MCHC RBC AUTO-ENTMCNC: 32 G/DL (ref 30–36.5)
MCV RBC AUTO: 89.4 FL (ref 80–99)
PLATELET # BLD AUTO: 193 K/UL (ref 150–400)
POTASSIUM SERPL-SCNC: 4 MMOL/L (ref 3.5–5.1)
RBC # BLD AUTO: 3.98 M/UL (ref 3.8–5.2)
SODIUM SERPL-SCNC: 136 MMOL/L (ref 136–145)
WBC # BLD AUTO: 86 K/UL (ref 3.6–11)

## 2017-07-04 PROCEDURE — 74011250636 HC RX REV CODE- 250/636: Performed by: HOSPITALIST

## 2017-07-04 PROCEDURE — 74011250637 HC RX REV CODE- 250/637: Performed by: PSYCHIATRY & NEUROLOGY

## 2017-07-04 PROCEDURE — 80162 ASSAY OF DIGOXIN TOTAL: CPT | Performed by: HOSPITALIST

## 2017-07-04 PROCEDURE — 80048 BASIC METABOLIC PNL TOTAL CA: CPT | Performed by: HOSPITALIST

## 2017-07-04 PROCEDURE — 85027 COMPLETE CBC AUTOMATED: CPT | Performed by: HOSPITALIST

## 2017-07-04 PROCEDURE — 74011250637 HC RX REV CODE- 250/637: Performed by: HOSPITALIST

## 2017-07-04 PROCEDURE — 74011250636 HC RX REV CODE- 250/636: Performed by: INTERNAL MEDICINE

## 2017-07-04 PROCEDURE — 74011250637 HC RX REV CODE- 250/637: Performed by: INTERNAL MEDICINE

## 2017-07-04 PROCEDURE — 74011250637 HC RX REV CODE- 250/637: Performed by: NURSE PRACTITIONER

## 2017-07-04 PROCEDURE — 65660000000 HC RM CCU STEPDOWN

## 2017-07-04 PROCEDURE — 36415 COLL VENOUS BLD VENIPUNCTURE: CPT | Performed by: HOSPITALIST

## 2017-07-04 RX ORDER — FACIAL-BODY WIPES
10 EACH TOPICAL DAILY PRN
Status: DISCONTINUED | OUTPATIENT
Start: 2017-07-04 | End: 2017-07-11 | Stop reason: HOSPADM

## 2017-07-04 RX ORDER — DOCUSATE SODIUM 100 MG/1
100 CAPSULE, LIQUID FILLED ORAL DAILY
Status: DISCONTINUED | OUTPATIENT
Start: 2017-07-04 | End: 2017-07-11 | Stop reason: HOSPADM

## 2017-07-04 RX ORDER — POLYETHYLENE GLYCOL 3350 17 G/17G
17 POWDER, FOR SOLUTION ORAL DAILY
Status: DISCONTINUED | OUTPATIENT
Start: 2017-07-04 | End: 2017-07-04

## 2017-07-04 RX ADMIN — Medication 10 ML: at 23:12

## 2017-07-04 RX ADMIN — ATORVASTATIN CALCIUM 20 MG: 20 TABLET, FILM COATED ORAL at 08:54

## 2017-07-04 RX ADMIN — Medication 10 ML: at 06:00

## 2017-07-04 RX ADMIN — SODIUM CHLORIDE 75 ML/HR: 900 INJECTION, SOLUTION INTRAVENOUS at 06:35

## 2017-07-04 RX ADMIN — Medication 10 ML: at 17:54

## 2017-07-04 RX ADMIN — HEPARIN SODIUM 5000 UNITS: 5000 INJECTION, SOLUTION INTRAVENOUS; SUBCUTANEOUS at 02:33

## 2017-07-04 RX ADMIN — HEPARIN SODIUM 5000 UNITS: 5000 INJECTION, SOLUTION INTRAVENOUS; SUBCUTANEOUS at 10:34

## 2017-07-04 RX ADMIN — DOCUSATE SODIUM 100 MG: 100 CAPSULE, LIQUID FILLED ORAL at 14:59

## 2017-07-04 RX ADMIN — HEPARIN SODIUM 5000 UNITS: 5000 INJECTION, SOLUTION INTRAVENOUS; SUBCUTANEOUS at 19:19

## 2017-07-04 RX ADMIN — SODIUM CHLORIDE 75 ML/HR: 900 INJECTION, SOLUTION INTRAVENOUS at 23:12

## 2017-07-04 RX ADMIN — Medication 325 MG: at 08:55

## 2017-07-04 RX ADMIN — METOPROLOL SUCCINATE 50 MG: 50 TABLET, EXTENDED RELEASE ORAL at 08:55

## 2017-07-04 NOTE — INTERDISCIPLINARY ROUNDS
IDR/SLIDR Summary          Patient: Ana Rosa Vasquez MRN: [id number]    Age: 80 y.o. YOB: 1932 Room/Bed: [facility]   Admit Diagnosis: Acute CVA (cerebrovascular accident) [facility])  Principal Diagnosis: Cerebrovascular accident (CVA) due to thrombosis of left middle cerebral artery ([address]   Goals: discharge planning  Readmission: NO  Quality Measure: Not applicable  VTE Prophylaxis: Chemical  Influenza Vaccine screening completed? YES  Pneumococcal Vaccine screening completed? YES  Mobility needs: Yes   Nutrition plan:Yes  Consults:Speech    Financial concerns:No  Escalated to CM? YES  RRAT Score: 30   Interventions:  Testing due for pt today?  NO  LOS: 4 days Expected length of stay longer than expected days  Discharge plan: [place] [address]   PCP: Padmini Young MD  Transportation needs: Yes    Days before discharge:one day until discharge   Discharge disposition: Rehab    Signed:     Randa Collado RN  7/4/2017

## 2017-07-04 NOTE — PROGRESS NOTES
Hospitalist Progress Note  Shell Pollard NP  Office: [phone]  Cell: [phone]      Date of Service:  2017  NAME:  Doron De La Fuente  :  10/20/1932  MRN:  [id number]      Admission Summary:   The patient is an 80-year-old [employer] female with past medical history of hypertension, hypercholesterolemia, CLL, a-fib and not on [address], previous cva,  who came to the emergency room complaining of right lower extremity weakness x2 days. The patient reported that 2 days pta on , she started having some difficulties with walking because of right lower extremity weakness. Interval history / Subjective:   AA&Ox3, no acute distress, some mild weakness to RLE noted. Lives with niece,pending acceptance to Rehab and insurance authorization. Monday morning obtain records from PCP to evaluate kidney function. +bm today     Assessment & Plan:     L>R hemispheric punctate infarcts (POA)  - most c/w cardioembolic source  - no significant vessel stenosis/occlusionon CTA   - per neurology: concern for possible cerebral amyloid angiopathy given multifocal remote microhemorrhages on imaging. Hesitant to initiate [address] and would elect to continue with ASA monotherapy for stroke prevention. - continue asa/statin  - evaluated by speech, dental soft, thin liquids    Atrial flutter  - ef 40-45%,  severehypokinesis of the basal-mid anteroseptal and apical septal wall(s). Moderate concentric hypertrophy  -  cardiology agrees to stop Digoxin. Continue metoprolol, rate controlled.     KEVIN (POA)  - avoid nephrotoxic agents, was on furosemide at home  - obtain pcp records for baseline cr    Hypertension  - chronic, stable    History of CLL  - follows with Dr. Lai Gonzalez q 6 months      Code Status : Full code   Care Plan discussed with: patient, family, nurse, attending MD  Disposition: Rehab, needs prior [facility] Problems  Date Reviewed: 6/30/2017          Codes Class Noted POA    * (Principal)Cerebrovascular accident (CVA) due to thrombosis of left middle cerebral artery ([facility] ICD-10-CM: X15.938  ICD-9-CM: 434.01  6/30/2017 Yes        CLL (chronic lymphocytic leukemia) ([facility] ICD-10-CM: C91.10  ICD-9-CM: 204.10  Unknown Yes        CHF (congestive heart failure) ([facility] ICD-10-CM: I50.9  ICD-9-CM: 428.0  10/24/2014 Yes        HTN (hypertension) ICD-10-CM: I10  ICD-9-CM: 401.9  1/13/2010 Yes        Mixed hyperlipidemia ICD-10-CM: N74.0  ICD-9-CM: 272.2  1/13/2010 Yes                Review of Systems:   A comprehensive review of systems was negative except for that written in the HPI. Vital Signs:    Last 24hrs VS reviewed since prior progress note. Most recent are:  Visit Vitals    /81 (BP 1 Location: Left arm, BP Patient Position: At rest;Head of bed elevated (Comment degrees))    Pulse 79    Temp 96.8 °F (36 °C)    Resp 19    Ht 5' 5\" (1.651 m)    Wt 60.5 kg (133 lb 6.1 oz)    SpO2 96%    Breastfeeding No    BMI 22.2 kg/m2       No intake or output data in the 24 hours ending 07/04/17 1745     Physical Examination:             Constitutional:  No acute distress, cooperative, pleasant    ENT:  Oral mucous moist, oropharynx benign. Neck supple   Resp:  CTA bilaterally. No wheezing/rhonchi/rales. No accessory muscle use   CV:  Irregular rhythm, normal rate, no murmurs, gallops, rubs. A-flutter on tele    GI:  Soft, non distended, non tender. normoactive bowel sounds, no hepatosplenomegaly     Musculoskeletal:  No edema, warm, 2+ pulses throughout    Neurologic:  Moves all extremities. AAOx3, CN II-XII reviewed. Mild RLE hemiparesis     Psych:  Good insight, Not anxious nor agitated.        Data Review:    Review and/or order of clinical lab test      Labs:     Recent Labs      07/04/17   0233  07/03/17   0245   WBC  86.0*  94.5*   HGB  11.4*  12.6   HCT  35.6  38.9   PLT  193  177     Recent Labs      07/04/17   0233  07/03/17   024 NA  136  133*   K  4.0  3.7   CL  104  100   CO2  23  23   BUN  41*  36*   CREA  1.72*  1.94*   GLU  85  88   CA  8.0*  8.6     No results for input(s): SGOT, GPT, ALT, AP, TBIL, TBILI, TP, ALB, GLOB, GGT, AML, LPSE in the last 72 hours. No lab exists for component: AMYP, HLPSE  No results for input(s): INR, PTP, APTT in the last 72 hours. No lab exists for component: INREXT, INREXT   No results for input(s): FE, TIBC, PSAT, FERR in the last 72 hours. No results found for: FOL, RBCF   No results for input(s): PH, PCO2, PO2 in the last 72 hours. No results for input(s): CPK, CKNDX, TROIQ in the last 72 hours.     No lab exists for component: CPKMB  Lab Results   Component Value Date/Time    Cholesterol, total 145 07/01/2017 02:48 AM    HDL Cholesterol 60 07/01/2017 02:48 AM    LDL, calculated 72.8 07/01/2017 02:48 AM    Triglyceride 61 07/01/2017 02:48 AM    CHOL/HDL Ratio 2.4 07/01/2017 02:48 AM     Lab Results   Component Value Date/Time    Glucose (POC) 92 06/30/2017 06:49 AM    Glucose (POC) 90 11/02/2014 12:20 PM     Lab Results   Component Value Date/Time    Color YELLOW/STRAW 06/30/2017 08:28 AM    Appearance CLEAR 06/30/2017 08:28 AM    Specific gravity 1.014 06/30/2017 08:28 AM    pH (UA) 7.5 06/30/2017 08:28 AM    Protein NEGATIVE  06/30/2017 08:28 AM    Glucose NEGATIVE  06/30/2017 08:28 AM    Ketone NEGATIVE  06/30/2017 08:28 AM    Bilirubin NEGATIVE  06/30/2017 08:28 AM    Urobilinogen 0.2 06/30/2017 08:28 AM    Nitrites NEGATIVE  06/30/2017 08:28 AM    Leukocyte Esterase NEGATIVE  06/30/2017 08:28 AM    Epithelial cells FEW 06/30/2017 08:28 AM    Bacteria NEGATIVE  06/30/2017 08:28 AM    WBC 0-4 06/30/2017 08:28 AM    RBC 0-5 06/30/2017 08:28 AM         Medications Reviewed:     Current Facility-Administered Medications   Medication Dose Route Frequency    docusate sodium (COLACE) capsule 100 mg  100 mg Oral DAILY    bisacodyl (DULCOLAX) suppository 10 mg  10 mg Rectal DAILY PRN    metoprolol succinate (TOPROL-XL) XL tablet 50 mg  50 mg Oral DAILY    0.9% sodium chloride infusion  75 mL/hr IntraVENous CONTINUOUS    hydrALAZINE (APRESOLINE) 20 mg/mL injection 10 mg  10 mg IntraVENous Q6H PRN    aspirin (ASPIRIN) tablet 325 mg  325 mg Oral DAILY    sodium chloride (NS) flush 5-10 mL  5-10 mL IntraVENous Q8H    sodium chloride (NS) flush 5-10 mL  5-10 mL IntraVENous PRN    acetaminophen (TYLENOL) tablet 650 mg  650 mg Oral Q4H PRN    Or    acetaminophen (TYLENOL) solution 650 mg  650 mg Per NG tube Q4H PRN    Or    acetaminophen (TYLENOL) suppository 650 mg  650 mg Rectal Q4H PRN    heparin (porcine) injection 5,000 Units  5,000 Units SubCUTAneous Q8H    atorvastatin (LIPITOR) tablet 20 mg  20 mg Oral DAILY     ______________________________________________________________________  EXPECTED LENGTH OF STAY: 3d 7h  ACTUAL LENGTH OF STAY:          4                 Milady Garza V NP

## 2017-07-04 NOTE — PROGRESS NOTES
NUTRITION brief    Recommendations:   1. Add bowel regimen - no BM since admit (5 days)  2. Continue to encourage fluids and PO intake at meals and with supplements  3. Consider trial of appetite stimulant  4. Weekly weights    Interventions:   - diet liberalized to soft solids, 2gm Na  - resume Ensure BID, Boost Pudding     Diet: dental soft, cardiac  Supplements: none  Meds: NS @ 75ml/hr    Seen by SLP and cleared for dental soft diet. No PO intake on 7/2 d/t NPO status. IVF increased with poor fluid intake noted by nursing. Will resume supplements discussed during previous visit: Ensure BID(strawberry) and trial Boost Pudding (940kcal, 46g protein). Poor appetite x 5 months noted, consider trial of appetite stimulant. Of note: No BM since admit (5 days). Hx of issues with constipation noted. No bowel regimen rx, consider adding bowel regimen. Will continue to follow for PO intake, supplement acceptance. See previous note for goals and monitoring/evaluation. Estimated Nutrition Needs:   Kcals/day: 1240 Kcals/day (1240-1340kcal)  Protein: 65 g (65-71g (1.1-1.2g/kg))  Fluid: 1500 ml  Based On:  [place] Jeor (x 1.2-1.3)  Weight Used: Actual wt (58.9kg)    Shaun Barbosa RD

## 2017-07-04 NOTE — ROUTINE PROCESS
Bedside shift change report given to Quiana Bernardo (oncoming nurse) by Bina Sanchez (offgoing nurse). Report included the following information SBAR, Kardex and Cardiac Rhythm A Fib/ A Flutter.

## 2017-07-05 LAB
ANION GAP BLD CALC-SCNC: 8 MMOL/L (ref 5–15)
BUN SERPL-MCNC: 33 MG/DL (ref 6–20)
BUN/CREAT SERPL: 21 (ref 12–20)
CALCIUM SERPL-MCNC: 8 MG/DL (ref 8.5–10.1)
CHLORIDE SERPL-SCNC: 109 MMOL/L (ref 97–108)
CO2 SERPL-SCNC: 22 MMOL/L (ref 21–32)
CREAT SERPL-MCNC: 1.56 MG/DL (ref 0.55–1.02)
GLUCOSE SERPL-MCNC: 99 MG/DL (ref 65–100)
POTASSIUM SERPL-SCNC: 3.4 MMOL/L (ref 3.5–5.1)
SODIUM SERPL-SCNC: 139 MMOL/L (ref 136–145)

## 2017-07-05 PROCEDURE — 97116 GAIT TRAINING THERAPY: CPT

## 2017-07-05 PROCEDURE — 65660000000 HC RM CCU STEPDOWN

## 2017-07-05 PROCEDURE — 36415 COLL VENOUS BLD VENIPUNCTURE: CPT

## 2017-07-05 PROCEDURE — 74011250636 HC RX REV CODE- 250/636: Performed by: INTERNAL MEDICINE

## 2017-07-05 PROCEDURE — 97535 SELF CARE MNGMENT TRAINING: CPT

## 2017-07-05 PROCEDURE — 74011250637 HC RX REV CODE- 250/637: Performed by: PSYCHIATRY & NEUROLOGY

## 2017-07-05 PROCEDURE — 74011250637 HC RX REV CODE- 250/637: Performed by: INTERNAL MEDICINE

## 2017-07-05 PROCEDURE — 80048 BASIC METABOLIC PNL TOTAL CA: CPT

## 2017-07-05 PROCEDURE — 74011250637 HC RX REV CODE- 250/637: Performed by: HOSPITALIST

## 2017-07-05 PROCEDURE — 74011250637 HC RX REV CODE- 250/637: Performed by: NURSE PRACTITIONER

## 2017-07-05 PROCEDURE — 97530 THERAPEUTIC ACTIVITIES: CPT

## 2017-07-05 RX ORDER — POTASSIUM CHLORIDE 750 MG/1
40 TABLET, FILM COATED, EXTENDED RELEASE ORAL
Status: COMPLETED | OUTPATIENT
Start: 2017-07-05 | End: 2017-07-05

## 2017-07-05 RX ADMIN — POTASSIUM CHLORIDE 40 MEQ: 750 TABLET, FILM COATED, EXTENDED RELEASE ORAL at 08:39

## 2017-07-05 RX ADMIN — HEPARIN SODIUM 5000 UNITS: 5000 INJECTION, SOLUTION INTRAVENOUS; SUBCUTANEOUS at 18:25

## 2017-07-05 RX ADMIN — HEPARIN SODIUM 5000 UNITS: 5000 INJECTION, SOLUTION INTRAVENOUS; SUBCUTANEOUS at 02:04

## 2017-07-05 RX ADMIN — Medication 10 ML: at 22:17

## 2017-07-05 RX ADMIN — Medication 325 MG: at 08:26

## 2017-07-05 RX ADMIN — ATORVASTATIN CALCIUM 20 MG: 20 TABLET, FILM COATED ORAL at 08:26

## 2017-07-05 RX ADMIN — ACETAMINOPHEN 325 MG: 325 TABLET, FILM COATED ORAL at 22:22

## 2017-07-05 RX ADMIN — HEPARIN SODIUM 5000 UNITS: 5000 INJECTION, SOLUTION INTRAVENOUS; SUBCUTANEOUS at 09:57

## 2017-07-05 RX ADMIN — Medication 10 ML: at 18:25

## 2017-07-05 RX ADMIN — METOPROLOL SUCCINATE 50 MG: 50 TABLET, EXTENDED RELEASE ORAL at 08:26

## 2017-07-05 RX ADMIN — Medication 10 ML: at 06:31

## 2017-07-05 NOTE — INTERDISCIPLINARY ROUNDS
IDR/SLIDR Summary          Patient: Mehdi Khan MRN: [id number]    Age: 80 y.o. YOB: 1932 Room/Bed: [facility]   Admit Diagnosis: Acute CVA (cerebrovascular accident) [facility])  Principal Diagnosis: Cerebrovascular accident (CVA) due to thrombosis of left middle cerebral artery ([address]   Goals: discharge planning  Readmission: NO  Quality Measure: Not applicable  VTE Prophylaxis: Chemical  Influenza Vaccine screening completed? YES  Pneumococcal Vaccine screening completed? YES  Mobility needs: Yes   Nutrition plan:Yes  Consults:Speech    Financial concerns:No  Escalated to CM? YES  RRAT Score: 30   Interventions:  Testing due for pt today?  NO  LOS: 5 days Expected length of stay longer than expected days  Discharge plan: [address]   PCP: Camille Hill MD  Transportation needs: Yes    Days before discharge:one day until discharge   Discharge disposition: Rehab    Signed:     Gui Landin RN  7/5/2017

## 2017-07-05 NOTE — PROGRESS NOTES
Speech pathology  Chart reviewed and spoke with RN. SLP re-assessed patient on Monday 7/3 and recommended a dental soft diet/ thin liquids with use of alternating liquids/solids to reduce oral residue. RN reports excellent tolerance with diet but intake is limited. Recommend continuing on current diet. She will likely benefit from  No furthter SLP needs at this time. Will sign off. Thanks.    Karla Ward M.S. CCC-SLP

## 2017-07-05 NOTE — PROGRESS NOTES
Problem: Self Care Deficits Care Plan (Adult)  Goal: *Acute Goals and Plan of Care (Insert Text)  Occupational Therapy Goals  Initiated 7/3/2017  1. Patient will perform upper body dressing with minimal assistance/contact guard assist within 7 day(s). 2. Patient will perform lower body dressing with moderate assistance within 7 day(s). 3. Patient will perform bathing with moderate assistance within 7 day(s). 4. Patient will perform toilet transfers with moderate assistance within 7 day(s). 5. Patient will perform all aspects of toileting with moderate assistance within 7 day(s). OCCUPATIONAL THERAPY TREATMENT  Patient: Natividad Nur (61 y.o. female)  Date: 7/5/2017  Diagnosis: Acute CVA (cerebrovascular accident) [facility]) Cerebrovascular accident (CVA) due to thrombosis of left middle cerebral artery [facility])       Precautions: Fall      ASSESSMENT:  Pt was cleared for OT by RN. Pt received seated in chair and agreeable to OT. Pt oriented to self only. Reoriented pt during session with carryover only of orientation to place once but as session progressed pt again oriented x 1 only. Pt very pleasant with confusion and stated, \"They are having dinner for me\". Staff arrived to obtain pts dinner and breakfast orders and pt with zero recall of her order and any meaningful information from her order immediately after staff exited. Pt completed sit-stand-sit transfer minimum A x 2 person with repeated training required for posture/maintaining base of support (posterior lean + intermittent sway). Trained pt in LB dressing, pt able to complete modified Tailor sit to access LLE but difficulty completing with RLE due to weakness. Recommend continued skilled OT intervention to maximize independence with ADL and related mobility. Recommend rehabilitation program at next level of care.     Progression toward goals:  [ ]       Improving appropriately and progressing toward goals  [X]       Improving slowly and progressing toward goals  [ ]       Not making progress toward goals and plan of care will be adjusted       PLAN:  Patient continues to benefit from skilled intervention to address the above impairments. Continue treatment per established plan of care. Discharge Recommendations:  Rehab  Further Equipment Recommendations for Discharge:  TBD at rehab       SUBJECTIVE:   Patient stated Come on innnnn honey!!!!!.      OBJECTIVE DATA SUMMARY:   Cognitive/Behavioral Status:  Neurologic State: Alert;Confused  Orientation Level: Oriented to person;Disoriented to place; Disoriented to situation;Disoriented to time  Cognition: Decreased attention/concentration  Perception: Cues to maintain midline in sitting;Cues to maintain midline in standing  Perseveration: No perseveration noted  Safety/Judgement: Decreased insight into deficits     Functional Mobility and Transfers for ADLs:  Bed Mobility:        Transfers:  Sit to Stand: Minimum assistance;Assist x2        Balance:  Sitting: Impaired  Sitting - Static: Fair (occasional)  Sitting - Dynamic: Fair (occasional)  Standing: Impaired  Standing - Static: Constant support; Fair  Standing - Dynamic : Poor     ADL Intervention:                                Lower Body Dressing Assistance  Socks: Minimum assistance           Cognitive Retraining  Orientation Retraining: Awareness of environment  Problem Solving: Awareness of environment  Executive Functions: Executing cognitive plans  Organizing/Sequencing: Breaking task down  Attention to Task: Multi-task  Following Commands: Awareness of environment  Safety/Judgement: Decreased insight into deficits     Neuro Re-Education:   Facilitated obtaining/maintainting base of support static standing for fall prevention. Pain:  Pain Scale 1: Numeric (0 - 10)  Pain Intensity 1: 0              Activity Tolerance:   Pt reported no dizziness/dsyspnea during session.   After treatment:   [X] Patient left in no apparent distress sitting up in chair  [ ] Patient left in no apparent distress in bed  [X] Call bell left within reach  [X] Nursing notified  [X] Caregiver present  [ ] Bed alarm activated      COMMUNICATION/COLLABORATION:   The patients plan of care was discussed with: Physical Therapy Assistant and Registered Nurse     Nick Wang  Time Calculation: 15 mins

## 2017-07-05 NOTE — PROGRESS NOTES
JOSE follow up. JOSE spoke with Jorge July, [address], who said that today patient was accepted for admission and that she will initiate request for insurance authorization.

## 2017-07-05 NOTE — PROGRESS NOTES
7/4/2017     Admit Date: 6/30/2017    Admit Diagnosis: Acute CVA (cerebrovascular accident) [facility])    Principal Problem:    Cerebrovascular accident (CVA) due to thrombosis of left middle cerebral artery ([facility] (6/30/2017)    Active Problems:    HTN (hypertension) (1/13/2010)      Mixed hyperlipidemia (1/13/2010)      CHF (congestive heart failure) ([facility] (10/24/2014)      CLL (chronic lymphocytic leukemia) ([facility]) ()        Assessment/Plan:  1. Atrial flutter with good HR control  Continue present therapy. Will see prn at your request.  Thanks       Subjective:  Feels good       Dyane Lillian denies chest pain. Objective:     Visit Vitals    /80 (BP 1 Location: Left arm, BP Patient Position: At rest;Head of bed elevated (Comment degrees))    Pulse 95    Temp 98.2 °F (36.8 °C)    Resp 20    Ht 5' 5\" (1.651 m)  Comment: drivers license    Wt 37.8 kg (133 lb 6.1 oz)    SpO2 97%    Breastfeeding No    BMI 22.2 kg/m2        Physical Exam:  Neck: supple, symmetrical, trachea midline, no adenopathy, thyroid: not enlarged, symmetric, no tenderness/mass/nodules, no carotid bruit and no JVD  Heart: irregularly irregular rhythm, S1, S2 normal  Lungs: clear to auscultation bilaterally, normal percussion bilaterally  Abdomen: soft, non-tender. Bowel sounds normal. No masses,  no organomegaly  Extremities: extremities normal, atraumatic, no cyanosis or edema  Pulses: 2+ and symmetric  Neurologic: Motor:R upper and lower extremity weakness      Labs:    No results for input(s): CPK, CKMB, TROIQ in the last 72 hours. No lab exists for component: CKQMB, CPKMB  Recent Labs      07/04/17   0233   NA  136   K  4.0   CL  104   BUN  41*   CREA  1.72*   GLU  85   CA  8.0*     Recent Labs      07/04/17   0233   WBC  86.0*   HGB  11.4*   HCT  35.6   PLT  193     No results for input(s): CHOL, LDLC in the last 72 hours.     No lab exists for component: TGL, HDLC,  HBA1C    Telemetry: atrial flutter     Data Review: current meds, labs,recent radiology, intake/output/weight and problem list reviewed

## 2017-07-05 NOTE — ROUTINE PROCESS
Bedside shift change report given to [facility] (oncoming nurse) by Alice Bailey (offgoing nurse). Report included the following information SBAR, Kardex, MAR and Cardiac Rhythm A Fib/ A Flutter.

## 2017-07-05 NOTE — PROGRESS NOTES
Hospitalist Progress Note  Antoinette Lafleur NP  Office: [phone]  Cell: [phone]      Date of Service:  2017  NAME:  Hallie Pedroza  :  10/20/1932  MRN:  [id number]      Admission Summary:   The patient is an 80-year-old [facility] [employer] female with past medical history of hypertension, hypercholesterolemia, CLL, a-fib and not on [address], previous cva,  who came to the emergency room complaining of right lower extremity weakness x2 days. The patient reported that 2 days pta on , she started having some difficulties with walking because of right lower extremity weakness. Interval history / Subjective:   Working with PT, now sitting in chair. Offers no complaints. Accepted to rehab, awaiting ins authorization. Cr improving     Assessment & Plan:     L>R hemispheric punctate infarcts (POA)  - most c/w cardioembolic source  - no significant vessel stenosis/occlusionon CTA   - per neurology: concern for possible cerebral amyloid angiopathy given multifocal remote microhemorrhages on imaging. Hesitant to initiate [address] and would elect to continue with ASA monotherapy for stroke prevention. - continue asa/statin  - evaluated by speech, dental soft, thin liquids    Atrial flutter  - ef 40-45%,  severehypokinesis of the basal-mid anteroseptal and apical septal wall(s). Moderate concentric hypertrophy  -  cardiology agrees to stop Digoxin. Continue metoprolol, rate controlled.     KEVIN on CKD III (POA)  - avoid nephrotoxic agents, was on furosemide at home  - GFR from 2017 is 52 and from 10/2016 is also 52, per PCP lab work,on chart  -  Cr improved to 1.56    Hypertension  - chronic, stable    History of CLL  - follows with Dr. Jody Gurrola q 6 months      Code Status : Full code   Care Plan discussed with: patient, family, nurse, attending MD  Disposition: Rehab, needs prior [facility] Problems  Date Reviewed: 2017 Codes Class Noted POA    * (Principal)Cerebrovascular accident (CVA) due to thrombosis of left middle cerebral artery ([facility] ICD-10-CM: X47.276  ICD-9-CM: 434.01  6/30/2017 Yes        CLL (chronic lymphocytic leukemia) ([facility] ICD-10-CM: C91.10  ICD-9-CM: 204.10  Unknown Yes        CHF (congestive heart failure) ([facility] ICD-10-CM: I50.9  ICD-9-CM: 428.0  10/24/2014 Yes        HTN (hypertension) ICD-10-CM: I10  ICD-9-CM: 401.9  1/13/2010 Yes        Mixed hyperlipidemia ICD-10-CM: J16.2  ICD-9-CM: 272.2  1/13/2010 Yes                Review of Systems:   A comprehensive review of systems was negative except for that written in the HPI. Vital Signs:    Last 24hrs VS reviewed since prior progress note. Most recent are:  Visit Vitals    /73 (BP 1 Location: Right arm, BP Patient Position: At rest;Sitting)    Pulse 80    Temp 95.8 °F (35.4 °C)    Resp 18    Ht 5' 5\" (1.651 m)    Wt 60.2 kg (132 lb 11.5 oz)    SpO2 98%    Breastfeeding No    BMI 22.09 kg/m2         Intake/Output Summary (Last 24 hours) at 07/05/17 1336  Last data filed at 07/05/17 0800   Gross per 24 hour   Intake              120 ml   Output                0 ml   Net              120 ml        Physical Examination:             Constitutional:  No acute distress, cooperative, pleasant    ENT:  Oral mucous moist, oropharynx benign. Neck supple   Resp:  CTA bilaterally. No wheezing/rhonchi/rales. No accessory muscle use   CV:  Irregular rhythm, normal rate, no murmurs, gallops, rubs. A-flutter on tele    GI:  Soft, non distended, non tender. normoactive bowel sounds, no hepatosplenomegaly. BM 7/4    Musculoskeletal:  No edema, warm, 2+ pulses throughout    Neurologic:  Moves all extremities. AAOx3, CN II-XII reviewed. Mild RLE hemiparesis     Psych:  Good insight, Not anxious nor agitated.        Data Review:    Review and/or order of clinical lab test      Labs:     Recent Labs      07/04/17   0233  07/03/17   0245   WBC  86.0*  94.5*   HGB 11.4*  12.6   HCT  35.6  38.9   PLT  193  177     Recent Labs      07/05/17   0207  07/04/17   0233  07/03/17   0245   NA  139  136  133*   K  3.4*  4.0  3.7   CL  109*  104  100   CO2  22  23  23   BUN  33*  41*  36*   CREA  1.56*  1.72*  1.94*   GLU  99  85  88   CA  8.0*  8.0*  8.6     No results for input(s): SGOT, GPT, ALT, AP, TBIL, TBILI, TP, ALB, GLOB, GGT, AML, LPSE in the last 72 hours. No lab exists for component: AMYP, HLPSE  No results for input(s): INR, PTP, APTT in the last 72 hours. No lab exists for component: INREXT, INREXT   No results for input(s): FE, TIBC, PSAT, FERR in the last 72 hours. No results found for: FOL, RBCF   No results for input(s): PH, PCO2, PO2 in the last 72 hours. No results for input(s): CPK, CKNDX, TROIQ in the last 72 hours.     No lab exists for component: CPKMB  Lab Results   Component Value Date/Time    Cholesterol, total 145 07/01/2017 02:48 AM    HDL Cholesterol 60 07/01/2017 02:48 AM    LDL, calculated 72.8 07/01/2017 02:48 AM    Triglyceride 61 07/01/2017 02:48 AM    CHOL/HDL Ratio 2.4 07/01/2017 02:48 AM     Lab Results   Component Value Date/Time    Glucose (POC) 92 06/30/2017 06:49 AM    Glucose (POC) 90 11/02/2014 12:20 PM     Lab Results   Component Value Date/Time    Color YELLOW/STRAW 06/30/2017 08:28 AM    Appearance CLEAR 06/30/2017 08:28 AM    Specific gravity 1.014 06/30/2017 08:28 AM    pH (UA) 7.5 06/30/2017 08:28 AM    Protein NEGATIVE  06/30/2017 08:28 AM    Glucose NEGATIVE  06/30/2017 08:28 AM    Ketone NEGATIVE  06/30/2017 08:28 AM    Bilirubin NEGATIVE  06/30/2017 08:28 AM    Urobilinogen 0.2 06/30/2017 08:28 AM    Nitrites NEGATIVE  06/30/2017 08:28 AM    Leukocyte Esterase NEGATIVE  06/30/2017 08:28 AM    Epithelial cells FEW 06/30/2017 08:28 AM    Bacteria NEGATIVE  06/30/2017 08:28 AM    WBC 0-4 06/30/2017 08:28 AM    RBC 0-5 06/30/2017 08:28 AM         Medications Reviewed:     Current Facility-Administered Medications   Medication Dose Route Frequency    docusate sodium (COLACE) capsule 100 mg  100 mg Oral DAILY    bisacodyl (DULCOLAX) suppository 10 mg  10 mg Rectal DAILY PRN    metoprolol succinate (TOPROL-XL) XL tablet 50 mg  50 mg Oral DAILY    0.9% sodium chloride infusion  75 mL/hr IntraVENous CONTINUOUS    hydrALAZINE (APRESOLINE) 20 mg/mL injection 10 mg  10 mg IntraVENous Q6H PRN    aspirin (ASPIRIN) tablet 325 mg  325 mg Oral DAILY    sodium chloride (NS) flush 5-10 mL  5-10 mL IntraVENous Q8H    sodium chloride (NS) flush 5-10 mL  5-10 mL IntraVENous PRN    acetaminophen (TYLENOL) tablet 650 mg  650 mg Oral Q4H PRN    Or    acetaminophen (TYLENOL) solution 650 mg  650 mg Per NG tube Q4H PRN    Or    acetaminophen (TYLENOL) suppository 650 mg  650 mg Rectal Q4H PRN    heparin (porcine) injection 5,000 Units  5,000 Units SubCUTAneous Q8H    atorvastatin (LIPITOR) tablet 20 mg  20 mg Oral DAILY     ______________________________________________________________________  EXPECTED LENGTH OF STAY: 3d 7h  ACTUAL LENGTH OF STAY:          5                 Milady WATERS NP

## 2017-07-06 LAB
ANION GAP BLD CALC-SCNC: 6 MMOL/L (ref 5–15)
BUN SERPL-MCNC: 25 MG/DL (ref 6–20)
BUN/CREAT SERPL: 17 (ref 12–20)
CALCIUM SERPL-MCNC: 8.1 MG/DL (ref 8.5–10.1)
CHLORIDE SERPL-SCNC: 115 MMOL/L (ref 97–108)
CO2 SERPL-SCNC: 23 MMOL/L (ref 21–32)
CREAT SERPL-MCNC: 1.5 MG/DL (ref 0.55–1.02)
GLUCOSE SERPL-MCNC: 88 MG/DL (ref 65–100)
POTASSIUM SERPL-SCNC: 4.4 MMOL/L (ref 3.5–5.1)
SODIUM SERPL-SCNC: 144 MMOL/L (ref 136–145)

## 2017-07-06 PROCEDURE — 74011250637 HC RX REV CODE- 250/637: Performed by: INTERNAL MEDICINE

## 2017-07-06 PROCEDURE — 97112 NEUROMUSCULAR REEDUCATION: CPT

## 2017-07-06 PROCEDURE — 80048 BASIC METABOLIC PNL TOTAL CA: CPT

## 2017-07-06 PROCEDURE — 74011250637 HC RX REV CODE- 250/637: Performed by: PSYCHIATRY & NEUROLOGY

## 2017-07-06 PROCEDURE — 65660000000 HC RM CCU STEPDOWN

## 2017-07-06 PROCEDURE — 97535 SELF CARE MNGMENT TRAINING: CPT

## 2017-07-06 PROCEDURE — 97116 GAIT TRAINING THERAPY: CPT

## 2017-07-06 PROCEDURE — 36415 COLL VENOUS BLD VENIPUNCTURE: CPT

## 2017-07-06 PROCEDURE — 74011250636 HC RX REV CODE- 250/636: Performed by: HOSPITALIST

## 2017-07-06 PROCEDURE — 74011250637 HC RX REV CODE- 250/637: Performed by: HOSPITALIST

## 2017-07-06 PROCEDURE — 74011250636 HC RX REV CODE- 250/636: Performed by: INTERNAL MEDICINE

## 2017-07-06 RX ADMIN — Medication 10 ML: at 22:00

## 2017-07-06 RX ADMIN — HEPARIN SODIUM 5000 UNITS: 5000 INJECTION, SOLUTION INTRAVENOUS; SUBCUTANEOUS at 17:35

## 2017-07-06 RX ADMIN — ATORVASTATIN CALCIUM 20 MG: 20 TABLET, FILM COATED ORAL at 09:43

## 2017-07-06 RX ADMIN — Medication 325 MG: at 09:00

## 2017-07-06 RX ADMIN — METOPROLOL SUCCINATE 50 MG: 50 TABLET, EXTENDED RELEASE ORAL at 09:00

## 2017-07-06 RX ADMIN — Medication 10 ML: at 14:35

## 2017-07-06 RX ADMIN — HEPARIN SODIUM 5000 UNITS: 5000 INJECTION, SOLUTION INTRAVENOUS; SUBCUTANEOUS at 11:45

## 2017-07-06 RX ADMIN — SODIUM CHLORIDE 75 ML/HR: 900 INJECTION, SOLUTION INTRAVENOUS at 03:45

## 2017-07-06 RX ADMIN — HEPARIN SODIUM 5000 UNITS: 5000 INJECTION, SOLUTION INTRAVENOUS; SUBCUTANEOUS at 02:27

## 2017-07-06 NOTE — INTERDISCIPLINARY ROUNDS
IDR/SLIDR Summary          Patient: Thalia Lew MRN: [id number]    Age: 80 y.o. YOB: 1932 Room/Bed: [facility]   Admit Diagnosis: Acute CVA (cerebrovascular accident) ([facility] [address]  Principal Diagnosis: Cerebrovascular accident (CVA) due to thrombosis of left middle cerebral artery (HCC)   Goals:  mobility  Readmission: YES  Quality Measure: Not applicable  VTE Prophylaxis: Chemical  Influenza Vaccine screening completed? NO  Pneumococcal Vaccine screening completed? NO  Mobility needs: Yes   Nutrition plan:Yes  Consults:P.T, O.T. and Case Management    Financial concerns:No  Escalated to CM? NO  RRAT Score: ? [facility]  Testing due for pt today?  NO  LOS: 6 days Expected length of stay 7 days  Discharge plan: Rehab   PCP: Juanpablo Mancia MD  Transportation needs: Yes    Days before discharge:one day until discharge   Discharge disposition: Rehab    Signed:     Fredrick Chappell RN  7/6/2017  11:58 AM

## 2017-07-06 NOTE — PROGRESS NOTES
Bedside and Verbal shift change report given to [address] [facility],[address] (oncoming nurse) by VADIM Randolph RN (offgoing nurse). Report given with SBAR, Kardex, Intake/Output, MAR and Recent Results.

## 2017-07-06 NOTE — PROGRESS NOTES
Problem: Ischemic Stroke: Discharge Outcomes  Goal: *Verbalize understanding of prescribed medications including anti-coagulants, anti-lipid, and/or anti-platelets(Stroke Metric)  Outcome: Progressing Towards Goal  Aspirin, lipitor uses and side effects  Goal: *Progressive mobility and function  Outcome: Progressing Towards Goal  PT/OT  Goal: *Depression screening completed(Stroke Metric)  Outcome: Progressing Towards Goal  Awaiting authorization to [facility]    Transition RN to the Bedside to assist Primary RN with patient education, medication educations, discharge instructions, and stroke core measure compliance. Discharge concerns initiated and discussed with patient, including clarification on \"who\" assists the patient at their home and instructions for when the home going patient should call their provider after discharge. Opportunity for questions and clarification was provided. Patient receptive to education: YES  Patient stated:   Barriers to Education: intermittent confusion  Diagnosis Education given:  YES    Length of stay: 6  Expected Day of Discharge: tbd  Ask if they have \"Help at Home\" & add to white board?   YES    Education Day #: 1    Medication Education Given:  YES  M in the box Medication name: aspirin, atorvastatin, lipitor      Stroke Education documented in Patient Education: YES  Core Measures Documented in Connect Care:  Risk Factors: YES  Warning signs of stroke: YES  When to Activate 911: YES  Medication Education for Risk Factors: YES  Smoking cessation if applicable: YES  Written Education Given:  YES    Discharge NIH Completed: NO  Score: tbd    BRAINS: YES    Follow Up Appointment Made: NO  Date/Time if applicable: TBD

## 2017-07-06 NOTE — PROGRESS NOTES
Hospitalist Progress Note  Bala Deng NP  Office: [phone]  Cell: [phone]      Date of Service:  2017  NAME:  Mehdi Khan  :  10/20/1932  MRN:  [id number]      Admission Summary:   The patient is an 29-year-old [facility] female with past medical history of hypertension, hypercholesterolemia, CLL, a-fib and not on [address], previous cva,  who came to the emergency room complaining of right lower extremity weakness x2 days. The patient reported that 2 days pta on , she started having some difficulties with walking because of right lower extremity weakness. Interval history / Subjective:   Pt sitting up in chair, no acute distress, very pleasant, no complaints. Cr slowing trending down  Accepted to [facility], no beds today and pending ins authorization     Assessment & Plan:     L>R hemispheric punctate infarcts (POA)  - most c/w cardioembolic source  - no significant vessel stenosis/occlusionon CTA   - per neurology: concern for possible cerebral amyloid angiopathy given multifocal remote microhemorrhages on imaging. Hesitant to initiate [address] and would elect to continue with ASA monotherapy for stroke prevention. - continue asa/statin  - evaluated by speech, dental soft, thin liquids    Atrial flutter  - ef 40-45%,  severehypokinesis of the basal-mid anteroseptal and apical septal wall(s). Moderate concentric hypertrophy  -  cardiology agrees to stop Digoxin. Continue metoprolol, rate controlled.     KEVIN on CKD III (POA)  - avoid nephrotoxic agents, was on furosemide at home  - GFR from 2017 is 52 and from 10/2016 is also 52, per PCP lab work,on chart  -  Cr improved to 1.50    Hypertension  - chronic, stable    History of CLL  - follows with Dr. Pancho Allen q 6 months      Code Status : Full code   Care Plan discussed with: patient, family, nurse, attending MD  Disposition: Rehab, needs prior [facility] Problems  Date Reviewed: 6/30/2017          Codes Class Noted POA    * (Principal)Cerebrovascular accident (CVA) due to thrombosis of left middle cerebral artery ([facility] ICD-10-CM: L66.750  ICD-9-CM: 434.01  6/30/2017 Yes        CLL (chronic lymphocytic leukemia) ([facility] ICD-10-CM: C91.10  ICD-9-CM: 204.10  Unknown Yes        CHF (congestive heart failure) ([facility] ICD-10-CM: I50.9  ICD-9-CM: 428.0  10/24/2014 Yes        HTN (hypertension) ICD-10-CM: I10  ICD-9-CM: 401.9  1/13/2010 Yes        Mixed hyperlipidemia ICD-10-CM: G82.0  ICD-9-CM: 272.2  1/13/2010 Yes                Review of Systems:   A comprehensive review of systems was negative except for that written in the HPI. Vital Signs:    Last 24hrs VS reviewed since prior progress note. Most recent are:  Visit Vitals    BP (!) 113/95 (BP 1 Location: Right arm, BP Patient Position: Sitting)    Pulse 77    Temp 98 °F (36.7 °C)    Resp 13    Ht 5' 5\" (1.651 m)    Wt 62.8 kg (138 lb 7.2 oz)    SpO2 98%    Breastfeeding No    BMI 23.04 kg/m2         Intake/Output Summary (Last 24 hours) at 07/06/17 1403  Last data filed at 07/06/17 0000   Gross per 24 hour   Intake          4863.75 ml   Output                0 ml   Net          4863.75 ml        Physical Examination:             Constitutional:  No acute distress, cooperative, pleasant    ENT:  Oral mucous moist, oropharynx benign. Neck supple   Resp:  CTA bilaterally. No wheezing/rhonchi/rales. No accessory muscle use   CV:  Irregular rhythm, normal rate, no murmurs, gallops, rubs. A-flutter on tele    GI:  Soft, non distended, non tender. normoactive bowel sounds, no hepatosplenomegaly. BM 7/5    Musculoskeletal:  No edema, warm, 2+ pulses throughout    Neurologic:  Moves all extremities. AAOx3, CN II-XII reviewed. Mild RLE hemiparesis     Psych:  Good insight, Not anxious nor agitated.        Data Review:    Review and/or order of clinical lab test      Labs:     Recent Labs      07/04/17 0233   WBC  86.0*   HGB  11.4*   HCT  35.6   PLT  193     Recent Labs      07/06/17   0232  07/05/17   0207  07/04/17   0233   NA  144  139  136   K  4.4  3.4*  4.0   CL  115*  109*  104   CO2  23  22  23   BUN  25*  33*  41*   CREA  1.50*  1.56*  1.72*   GLU  88  99  85   CA  8.1*  8.0*  8.0*     No results for input(s): SGOT, GPT, ALT, AP, TBIL, TBILI, TP, ALB, GLOB, GGT, AML, LPSE in the last 72 hours. No lab exists for component: AMYP, HLPSE  No results for input(s): INR, PTP, APTT in the last 72 hours. No lab exists for component: INREXT, INREXT   No results for input(s): FE, TIBC, PSAT, FERR in the last 72 hours. No results found for: FOL, RBCF   No results for input(s): PH, PCO2, PO2 in the last 72 hours. No results for input(s): CPK, CKNDX, TROIQ in the last 72 hours.     No lab exists for component: CPKMB  Lab Results   Component Value Date/Time    Cholesterol, total 145 07/01/2017 02:48 AM    HDL Cholesterol 60 07/01/2017 02:48 AM    LDL, calculated 72.8 07/01/2017 02:48 AM    Triglyceride 61 07/01/2017 02:48 AM    CHOL/HDL Ratio 2.4 07/01/2017 02:48 AM     Lab Results   Component Value Date/Time    Glucose (POC) 92 06/30/2017 06:49 AM    Glucose (POC) 90 11/02/2014 12:20 PM     Lab Results   Component Value Date/Time    Color YELLOW/STRAW 06/30/2017 08:28 AM    Appearance CLEAR 06/30/2017 08:28 AM    Specific gravity 1.014 06/30/2017 08:28 AM    pH (UA) 7.5 06/30/2017 08:28 AM    Protein NEGATIVE  06/30/2017 08:28 AM    Glucose NEGATIVE  06/30/2017 08:28 AM    Ketone NEGATIVE  06/30/2017 08:28 AM    Bilirubin NEGATIVE  06/30/2017 08:28 AM    Urobilinogen 0.2 06/30/2017 08:28 AM    Nitrites NEGATIVE  06/30/2017 08:28 AM    Leukocyte Esterase NEGATIVE  06/30/2017 08:28 AM    Epithelial cells FEW 06/30/2017 08:28 AM    Bacteria NEGATIVE  06/30/2017 08:28 AM    WBC 0-4 06/30/2017 08:28 AM    RBC 0-5 06/30/2017 08:28 AM         Medications Reviewed:     Current Facility-Administered Medications Medication Dose Route Frequency    docusate sodium (COLACE) capsule 100 mg  100 mg Oral DAILY    bisacodyl (DULCOLAX) suppository 10 mg  10 mg Rectal DAILY PRN    metoprolol succinate (TOPROL-XL) XL tablet 50 mg  50 mg Oral DAILY    hydrALAZINE (APRESOLINE) 20 mg/mL injection 10 mg  10 mg IntraVENous Q6H PRN    aspirin (ASPIRIN) tablet 325 mg  325 mg Oral DAILY    sodium chloride (NS) flush 5-10 mL  5-10 mL IntraVENous Q8H    sodium chloride (NS) flush 5-10 mL  5-10 mL IntraVENous PRN    acetaminophen (TYLENOL) tablet 650 mg  650 mg Oral Q4H PRN    Or    acetaminophen (TYLENOL) solution 650 mg  650 mg Per NG tube Q4H PRN    Or    acetaminophen (TYLENOL) suppository 650 mg  650 mg Rectal Q4H PRN    heparin (porcine) injection 5,000 Units  5,000 Units SubCUTAneous Q8H    atorvastatin (LIPITOR) tablet 20 mg  20 mg Oral DAILY     ______________________________________________________________________  EXPECTED LENGTH OF STAY: 3d 7h  ACTUAL LENGTH OF STAY:          6                 Milady WATERS NP

## 2017-07-06 NOTE — PROGRESS NOTES
Bedside and Verbal shift change report given to Giovanni Bhakta RN (oncoming nurse) by Maxim Lock RN (offgoing nurse). Report included the following information SBAR, Kardex, MAR, Accordion and Cardiac Rhythm Afib; A flutter.

## 2017-07-06 NOTE — PROGRESS NOTES
Problem: Self Care Deficits Care Plan (Adult)  Goal: *Acute Goals and Plan of Care (Insert Text)  Occupational Therapy Goals  Initiated 7/3/2017  1. Patient will perform upper body dressing with minimal assistance/contact guard assist within 7 day(s). 2. Patient will perform lower body dressing with moderate assistance within 7 day(s). 3. Patient will perform bathing with moderate assistance within 7 day(s). 4. Patient will perform toilet transfers with moderate assistance within 7 day(s). 5. Patient will perform all aspects of toileting with moderate assistance within 7 day(s). 6. Patient will improve her Fugl-Thayer score by 5 points within 7 day(s). OCCUPATIONAL THERAPY TREATMENT  Patient: Patricia Smith (74 y.o. female)  Date: 7/6/2017  Diagnosis: Acute CVA (cerebrovascular accident) [facility]) Cerebrovascular accident (CVA) due to thrombosis of left middle cerebral artery [facility])       Precautions: Fall      ASSESSMENT:  Pt is making steady progress with acute therapy. Pt received in bed and agreeable to OT. Pt completing bed mobility sup-modA for R leg mgmt. Pt completing sit>stand Kelvin but requesting to sit EOB d/t fear of falling and RLE weakness. While EOB, pt completing grooming activities SBA, requiring verbal and tactile cues d/t posterior lean but overall demonstrating good sitting balance and bilateral coordination. Next session, recommend standing ADLs and standing balance activities. Recommend rehab on dc as pt is below functional baseline and is not safe to return home at this time. Progression toward goals:  [X]       Improving appropriately and progressing toward goals  [ ]       Improving slowly and progressing toward goals  [ ]       Not making progress toward goals and plan of care will be adjusted       PLAN:  Patient continues to benefit from skilled intervention to address the above impairments. Continue treatment per established plan of care.   Discharge Recommendations:  Rehab  Further Equipment Recommendations for Discharge:  Tbd       SUBJECTIVE:   Patient stated My right leg is so weak, when will it get better? Jovan Mike      OBJECTIVE DATA SUMMARY:   Cognitive/Behavioral Status:  Neurologic State: Alert  Orientation Level: Oriented to person;Oriented to place;Oriented to situation;Disoriented to time  Cognition: Decreased attention/concentration;Decreased command following; Appropriate safety awareness; Appropriate decision making  Perception: Appears intact  Perseveration: No perseveration noted  Safety/Judgement: Awareness of environment     Functional Mobility and Transfers for ADLs:  Bed Mobility:  Rolling: Supervision  Supine to Sit: Minimum assistance; Additional time  Sit to Supine: Moderate assistance;Assist x1 (for leg mgmt)     Transfers:  Sit to Stand: Minimum assistance        Balance:  Sitting: Intact  Sitting - Static: Fair (occasional)  Sitting - Dynamic: Fair (occasional)  Standing: Impaired; With support  Standing - Static: Fair  Standing - Dynamic : Poor     ADL Intervention:        Grooming  Grooming Assistance: SBA  Washing Face: SBA  Brushing Teeth: SBA                                   Cognitive Retraining  Safety/Judgement: Awareness of environment           Activity Tolerance:   VSS  Please refer to the flowsheet for vital signs taken during this treatment. After treatment:   [ ] Patient left in no apparent distress sitting up in chair  [X] Patient left in no apparent distress in bed  [X] Call bell left within reach  [X] Nursing notified  [ ] Caregiver present  [X] Bed alarm activated      COMMUNICATION/COLLABORATION:   The patients plan of care was discussed with: Physical Therapy Assistant and Registered Nurse     Ramses Irene  Time Calculation: 15 mins     Regarding student involvement in patient care:  A student participated in this treatment session. Per CMS Medicare statements and AOTA guidelines I certify that the following was true:  1.  I was present and directly observed the entire session. 2. I made all skilled judgments and clinical decisions regarding care. 3. I am the practitioner responsible for assessment, treatment, and documentation.

## 2017-07-06 NOTE — PROGRESS NOTES
JOSE spoke with Antonia at [address] [place] [address] today. She stated that her MD has approved pt to come to [address] [place] [address] pending authorization and bed availability. She has no beds today, but they are working on getting the [facility].  Rachele Terrell

## 2017-07-06 NOTE — PROGRESS NOTES
Problem: Mobility Impaired (Adult and Pediatric)  Goal: *Acute Goals and Plan of Care (Insert Text)  Physical Therapy Goals  Initiated 7/2/2017  1. Patient will move from supine to sit and sit to supine in bed with minimal assistance within 7 day(s). 2. Patient will transfer from bed to chair and chair to bed with moderate assistance using the least restrictive device within 7 day(s). 3. Patient will perform sit to stand with minimal assistance within 7 day(s). 4. Patient will ambulate with moderate assistance for 15 feet with the least restrictive device within 7 day(s). 5. Pt will increase Rice Balance score by 4-5 points to decrease risk of falls within 7 days. PHYSICAL THERAPY TREATMENT  Patient: Amelia Sheehan (86 y.o. female)  Date: 7/6/2017  Diagnosis: Acute CVA (cerebrovascular accident) [facility]) Cerebrovascular accident (CVA) due to thrombosis of left middle cerebral artery [facility])       Precautions: Fall  Chart, physical therapy assessment, plan of care and goals were reviewed. ASSESSMENT:  Pt pleasant on arrival with readiness to walk. Pt expressed RLE weakness. Pt displayed improved advancement of the right LE and foot clearance. Pt was required facilitation to weightshift and v.c to improve gait quality. Pt was able to perform standing balance task but required assistance due to unsteadiness and fear of falling. Pt was independent PTA and could benefit from inpt rehab to progress mobility and independence   Progression toward goals:  [X]      Improving appropriately and progressing toward goals  [ ]      Improving slowly and progressing toward goals  [ ]      Not making progress toward goals and plan of care will be adjusted       PLAN:  Patient continues to benefit from skilled intervention to address the above impairments. Continue treatment per established plan of care.   Discharge Recommendations:  Inpatient Rehab  Further Equipment Recommendations for Discharge:  TBD       SUBJECTIVE: Patient stated This leg needs to act right.       OBJECTIVE DATA SUMMARY:   Critical Behavior:  Neurologic State: Alert  Orientation Level: Oriented to person, Oriented to place, Oriented to situation, Disoriented to time  Cognition: Appropriate decision making, Appropriate for age attention/concentration, Appropriate safety awareness, Decreased attention/concentration  Safety/Judgement: Decreased insight into deficits  Functional Mobility Training:  Bed Mobility:     Supine to Sit: Minimum assistance                          Transfers:  Sit to Stand: Moderate assistance  Stand to Sit: Moderate assistance                             Balance:  Sitting: Impaired  Sitting - Static: Fair (occasional)  Standing: Impaired  Standing - Static: Poor  Standing - Dynamic : Poor  Ambulation/Gait Training:  Distance (ft): 70 Feet (ft)  Assistive Device: Gait belt  Ambulation - Level of Assistance: Minimal assistance; Moderate assistance; Additional time;Assist x2        Gait Abnormalities: Decreased step clearance; Hemiplegic        Base of Support: Center of gravity altered;Narrowed     Speed/Soraya: Slow  Step Length: Left shortened;Right shortened                               Stairs:              Neuro Re-Education:  Standing weight shifting single HHA for task. Weight shifting with marching. Bilateral HHA pt able to clear right LE but hesitation to march with left. Therapeutic Exercises:      Pain:  Pain Scale 1: Numeric (0 - 10)  Pain Intensity 1: 0              Activity Tolerance:   Limited   Please refer to the flowsheet for vital signs taken during this treatment.   After treatment:   [X] Patient left in no apparent distress sitting up in chair  [ ] Patient left in no apparent distress in bed  [X] Call bell left within reach  [X] Nursing notified  [ ] Caregiver present  [X] Bed alarm activated      COMMUNICATION/COLLABORATION:   The patients plan of care was discussed with: Registered Nurse     Maria Elena Gary PTA Time Calculation: 25 mins

## 2017-07-07 PROCEDURE — 97535 SELF CARE MNGMENT TRAINING: CPT

## 2017-07-07 PROCEDURE — 74011250637 HC RX REV CODE- 250/637: Performed by: INTERNAL MEDICINE

## 2017-07-07 PROCEDURE — 74011250637 HC RX REV CODE- 250/637: Performed by: HOSPITALIST

## 2017-07-07 PROCEDURE — 65660000000 HC RM CCU STEPDOWN

## 2017-07-07 PROCEDURE — 74011250636 HC RX REV CODE- 250/636: Performed by: INTERNAL MEDICINE

## 2017-07-07 PROCEDURE — 97530 THERAPEUTIC ACTIVITIES: CPT

## 2017-07-07 PROCEDURE — 74011250637 HC RX REV CODE- 250/637: Performed by: PSYCHIATRY & NEUROLOGY

## 2017-07-07 PROCEDURE — 74011250637 HC RX REV CODE- 250/637: Performed by: NURSE PRACTITIONER

## 2017-07-07 RX ADMIN — HEPARIN SODIUM 5000 UNITS: 5000 INJECTION, SOLUTION INTRAVENOUS; SUBCUTANEOUS at 02:48

## 2017-07-07 RX ADMIN — Medication 10 ML: at 22:00

## 2017-07-07 RX ADMIN — HEPARIN SODIUM 5000 UNITS: 5000 INJECTION, SOLUTION INTRAVENOUS; SUBCUTANEOUS at 10:03

## 2017-07-07 RX ADMIN — Medication 10 ML: at 14:30

## 2017-07-07 RX ADMIN — HEPARIN SODIUM 5000 UNITS: 5000 INJECTION, SOLUTION INTRAVENOUS; SUBCUTANEOUS at 17:16

## 2017-07-07 RX ADMIN — DOCUSATE SODIUM 100 MG: 100 CAPSULE, LIQUID FILLED ORAL at 08:57

## 2017-07-07 RX ADMIN — Medication 10 ML: at 06:59

## 2017-07-07 RX ADMIN — ATORVASTATIN CALCIUM 20 MG: 20 TABLET, FILM COATED ORAL at 08:57

## 2017-07-07 RX ADMIN — METOPROLOL SUCCINATE 50 MG: 50 TABLET, EXTENDED RELEASE ORAL at 08:57

## 2017-07-07 RX ADMIN — Medication 325 MG: at 08:57

## 2017-07-07 NOTE — INTERDISCIPLINARY ROUNDS
IDR/SLIDR Summary          Patient: Thalia Lew MRN: [id number]    Age: 80 y.o. YOB: 1932 Room/Bed: [facility]   Admit Diagnosis: Acute CVA (cerebrovascular accident) ([facility] [address]  Principal Diagnosis: Cerebrovascular accident (CVA) due to thrombosis of left middle cerebral artery (HCC)   Goals:  mobility  Readmission: YES                Quality Measure: Not applicable  VTE Prophylaxis: Chemical  Influenza Vaccine screening completed? NO  Pneumococcal Vaccine screening completed? NO  Mobility needs: Yes                                                           Nutrition plan:Yes  Consults:P.T, O.T. and Case Management                                     Financial concerns:No                                            Escalated to CM? NO  RRAT Score: 33                                                                  Interventions:H2H  Testing due for pt today? NO  LOS: 7 days Expected length of stay 7-8 days  Discharge plan: Rehab                                                                 PCP: Juanpablo Mancia MD  Transportation needs:  Yes                                       Days before discharge:one day until discharge   Discharge disposition: Rehab    Signed:     Manish Du RN  7/7/2017  09:00 AM

## 2017-07-07 NOTE — PROGRESS NOTES
Transition RN to the Bedside to assist Primary RN with patient education, medication educations, discharge instructions, and stroke core measure compliance. Discharge concerns initiated and discussed with patient, including clarification on \"who\" assists the patient at their home and instructions for when the home going patient should call their provider after discharge. Opportunity for questions and clarification was provided. Patient receptive to education: YES  Patient stated: \"I'm going to rehab first.\"  Barriers to Education: None  Diagnosis Education given:  YES    Length of stay: 7  Expected Day of Discharge: 8  Ask if they have \"Help at Home\" & add to white board?   YES    Education Day #: 2    Medication Education Given:  YES  M in the box Medication name: Metoprolol, Aspirin      Stroke Education documented in Patient Education: YES  Core Measures Documented in Connect Care:  Risk Factors: YES  Warning signs of stroke: YES  When to Activate 911: YES  Medication Education for Risk Factors: YES  Smoking cessation if applicable: YES  Written Education Given:  YES    Discharge NIH Completed: yes  Score: 2    BRAINS: NO    Follow Up Appointment Made: NO  Date/Time if applicable: LAURA

## 2017-07-07 NOTE — PROGRESS NOTES
Problem: Mobility Impaired (Adult and Pediatric)  Goal: *Acute Goals and Plan of Care (Insert Text)  Physical Therapy Goals  Initiated 7/2/2017  1. Patient will move from supine to sit and sit to supine in bed with minimal assistance within 7 day(s). 2. Patient will transfer from bed to chair and chair to bed with moderate assistance using the least restrictive device within 7 day(s). 3. Patient will perform sit to stand with minimal assistance within 7 day(s). 4. Patient will ambulate with moderate assistance for 15 feet with the least restrictive device within 7 day(s). 5. Pt will increase Rice Balance score by 4-5 points to decrease risk of falls within 7 days. PHYSICAL THERAPY TREATMENT  Patient: Cassi January (74 y.o. female)  Date: 7/7/2017  Diagnosis: Acute CVA (cerebrovascular accident) [facility]) Cerebrovascular accident (CVA) due to thrombosis of left middle cerebral artery [facility])       Precautions: Fall      ASSESSMENT:  Pt cleared by nsg. Pt lying down and stating she was feeling very fatigued but would try. Pt was min assist for bed mobility to edge of bed. Pt having difficulty with sit to stand and required 2 attempts. Pt was mod assist sit to stand. Pt able to take several steps up to head of bed with mod assist (up leading with right leg). Pt displaying decreased weight shifting in standing. Pt stating she flet she was not able to walk more than this today. Seated edge of bed, performed seated knee extension exercises. Once supine, performed bridges, ankle pumps. SLR or RLE. Pt also assist up to head of bed. Weakness persists on right side. Pt reminded about sitting up again in chair at supper time, educated on importance of continued OOB. Pt continues to be a good rehab candidate.    Progression toward goals:  [X]       Improving appropriately and progressing toward goals  [ ]       Improving slowly and progressing toward goals  [ ]       Not making progress toward goals and plan of care will be adjusted       PLAN:  Patient continues to benefit from skilled intervention to address the above impairments. Continue treatment per established plan of care. Discharge Recommendations:  Rehab  Further Equipment Recommendations for Discharge:  tbd       SUBJECTIVE:   Patient stated I dont know Im just tired. Uday Schwab      OBJECTIVE DATA SUMMARY:   Critical Behavior:  Neurologic State: Alert, Eyes open spontaneously  Orientation Level: Oriented to person, Oriented to place, Disoriented to time, Disoriented to situation  Cognition: Follows commands  Safety/Judgement: Awareness of environment  Functional Mobility Training:  Bed Mobility:  Rolling: Supervision  Supine to Sit: Minimum assistance  Sit to Supine: Minimum assistance  Scooting: Minimum assistance        Transfers:  Sit to Stand: Assist x1; Moderate assistance  Stand to Sit: Assist x1;Minimum assistance        Bed to Chair: Minimum assistance;Assist x1                    Balance:  Sitting: Intact  Sitting - Static: Good (unsupported)  Sitting - Dynamic: Fair (occasional)  Standing: Impaired  Standing - Static: Poor  Standing - Dynamic : Poor  Ambulation/Gait Training:      took 5 steps up to head of bed (towards weaker side) with mod assist of 1         Therapeutic Exercises:   See noted above  Pain:  Pain Scale 1: Numeric (0 - 10)  Pain Intensity 1: 0      Activity Tolerance:   good  Please refer to the flowsheet for vital signs taken during this treatment. After treatment:   [ ] Patient left in no apparent distress sitting up in chair  [X] Patient left in no apparent distress in bed  [X] Call bell left within reach  [X] Nursing notified  [ ] Caregiver present  [ ] Bed alarm activated      COMMUNICATION/EDUCATION:   The patients plan of care was discussed with: Occupational Therapist and Registered Nurse. Patient was educated regarding Her deficit(s) of right sided weakness as this relates to Her diagnosis of L CVA.   She demonstrated Excellent understanding as evidenced by verbal relaying of info. Patient and/or family was verbally educated on the BE FAST acronym for signs/symptoms of CVA and TIA. BE FAST was written on patient's communication board  for visual education and reinforcement. All questions answered with patient indicating good understanding.      [X]  Fall prevention education was provided and the patient/caregiver indicated understanding. [X]  Patient/family have participated as able in goal setting and plan of care. [X]  Patient/family agree to work toward stated goals and plan of care. [ ]  Patient understands intent and goals of therapy, but is neutral about his/her participation. [ ]  Patient is unable to participate in goal setting and plan of care.      Thank you for this referral.  Padma Rouse, PT   Time Calculation: 20 mins

## 2017-07-07 NOTE — PROGRESS NOTES
Bedside shift change report given to [address] [place] [address] (oncoming nurse) by Conner Zhong (offgoing nurse). Report included the following information SBAR, Kardex, MAR, Accordion, Recent Results and Alarm Parameters .

## 2017-07-07 NOTE — PROGRESS NOTES
Bedside and Verbal shift change report given to [address] [place] [address] Karina (oncoming nurse) by Lyle Mccray (offgoing nurse). Report included the following information SBAR, Kardex and Recent Results.

## 2017-07-07 NOTE — PROGRESS NOTES
Problem: Self Care Deficits Care Plan (Adult)  Goal: *Acute Goals and Plan of Care (Insert Text)  Occupational Therapy Goals  Initiated 7/3/2017  1. Patient will perform upper body dressing with minimal assistance/contact guard assist within 7 day(s). 2. Patient will perform lower body dressing with moderate assistance within 7 day(s). 3. Patient will perform bathing with moderate assistance within 7 day(s). 4. Patient will perform toilet transfers with moderate assistance within 7 day(s). 5. Patient will perform all aspects of toileting with moderate assistance within 7 day(s). 6. Patient will improve her Fugl-Thayer score by 5 points within 7 day(s). OCCUPATIONAL THERAPY TREATMENT  Patient: Tian Whitley (89 y.o. female)  Date: 7/7/2017  Diagnosis: Acute CVA (cerebrovascular accident) [facility]) Cerebrovascular accident (CVA) due to thrombosis of left middle cerebral artery [facility])       Precautions: Fall      ASSESSMENT:  Pt making steady progress with acute therapy. Pt received in bed and agreeable to OT. Pt completing bed>chair transfer Kelvin w/ verbal cues for moving RLE. Pt completing standing grooming activities for 10 minutes, demonstrating good functional reach and fair dynamic standing balance. Pt leaning to L side and requiring physical and verbal cues to weightshift onto RLE. Pt with no s/s of fatigue during session. Recommend rehab as pt is below functional baseline and can tolerate 3 hours of therapy a day. Progression toward goals:  [ ]       Improving appropriately and progressing toward goals  [ ]       Improving slowly and progressing toward goals  [ ]       Not making progress toward goals and plan of care will be adjusted       PLAN:  Patient continues to benefit from skilled intervention to address the above impairments. Continue treatment per established plan of care.   Discharge Recommendations:  Rehab  Further Equipment Recommendations for Discharge:  tbd       SUBJECTIVE:   Patient stated I hope I can go to rehab      OBJECTIVE DATA SUMMARY:   Cognitive/Behavioral Status:    Neurologic State: Alert  Orientation Level: Oriented to person;Oriented to place;Oriented to situation;Disoriented to time  Cognition: Slightly decreased attention/concentration; Appropriate command following; Appropriate safety awareness; Appropriate decision making  Perception: Appears intact  Perseveration: No perseveration noted  Safety/Judgement: Awareness of environment                    Functional Mobility and Transfers for ADLs:  Bed Mobility:  Rolling: Supervision  Supine to Sit: Minimum assistance;Assist x1;Additional time  Sit to Supine: Minimum assistance;Assist x1;Additional time  Scooting: Supervision     Transfers:  Sit to Stand: Minimum assistance;Assist x1;Additional time        Balance:  Sitting: Intact  Sitting - Static: Good (unsupported)  Sitting - Dynamic: Fair (occasional)  Standing: Impaired; With support  Standing - Static: Fair  Standing - Dynamic : Poor     ADL Intervention:        Grooming  Grooming Assistance: Contact guard assistance  Brushing Teeth: Contact guard assistance                                         Therapeutic Exercises: In standing, pt completing functional reach activities with no LOB; however, pt leaning on left side and requiring physical and verbal cues to weightshift onto RLE     Activity Tolerance:   VSS  Please refer to the flowsheet for vital signs taken during this treatment.   After treatment:   [X] Patient left in no apparent distress sitting up in chair  [ ] Patient left in no apparent distress in bed  [X] Call bell left within reach  [X] Nursing notified  [X] Caregiver present  [ ] Bed alarm activated      COMMUNICATION/COLLABORATION:   The patients plan of care was discussed with: Physical Therapist and Registered Nurse     Edmar Nurse  Time Calculation: 27 mins     Regarding student involvement in patient care:  A student participated in this treatment session. Per CMS Medicare statements and AOTA guidelines I certify that the following was true:  1. I was present and directly observed the entire session. 2. I made all skilled judgments and clinical decisions regarding care. 3. I am the practitioner responsible for assessment, treatment, and documentation.

## 2017-07-07 NOTE — PROGRESS NOTES
Hospitalist Progress Note  Medina Hayward NP  Office: [phone]  Cell: [phone]      Date of Service:  2017  NAME:  Claudell Critchley  :  10/20/1932  MRN:  [id number]      Admission Summary:   The patient is an 80-year-old [facility] American female with past medical history of hypertension, hypercholesterolemia, CLL, a-fib and not on [address], previous cva,  who came to the emergency room complaining of right lower extremity weakness x2 days. The patient reported that 2 days pta on , she started having some difficulties with walking because of right lower extremity weakness. Interval history / Subjective:   Pt w/o complaints, still waiting for ins authorization. Accepted to [facility]:     L>R hemispheric punctate infarcts (POA)  - most c/w cardioembolic source  - no significant vessel stenosis/occlusionon CTA   - per neurology: concern for possible cerebral amyloid angiopathy given multifocal remote microhemorrhages on imaging. Hesitant to initiate [address] and would elect to continue with ASA monotherapy for stroke prevention. - continue asa/statin  - evaluated by speech, dental soft, thin liquids    Atrial flutter  - ef 40-45%,  severehypokinesis of the basal-mid anteroseptal and apical septal wall(s). Moderate concentric hypertrophy  -  cardiology agrees to stop Digoxin. Continue metoprolol, rate controlled.     KEVIN on CKD III (POA)  - avoid nephrotoxic agents, was on furosemide at home  - GFR from 2017 is 52 and from 10/2016 is also 52, per PCP lab work,on chart  -  Cr improved to 1.50    Hypertension  - chronic, stable    History of CLL  - follows with Dr. Alonzo Florentino q 6 months      Code Status : Full code   Care Plan discussed with: patient, family, nurse, attending MD  Disposition: Rehab, needs prior Community Health Problems  Date Reviewed: 2017          Codes Class Noted POA    * (Principal)Cerebrovascular accident (CVA) due to thrombosis of left middle cerebral artery ([facility] ICD-10-CM: C51.986  ICD-9-CM: 434.01  6/30/2017 Yes        CLL (chronic lymphocytic leukemia) ([facility] ICD-10-CM: C91.10  ICD-9-CM: 204.10  Unknown Yes        CHF (congestive heart failure) (HCC) ICD-10-CM: I50.9  ICD-9-CM: 428.0  10/24/2014 Yes        HTN (hypertension) ICD-10-CM: I10  ICD-9-CM: 401.9  1/13/2010 Yes        Mixed hyperlipidemia ICD-10-CM: D88.1  ICD-9-CM: 272.2  1/13/2010 Yes                Review of Systems:   A comprehensive review of systems was negative except for that written in the HPI. Vital Signs:    Last 24hrs VS reviewed since prior progress note. Most recent are:  Visit Vitals    /89 (BP 1 Location: Right arm, BP Patient Position: At rest)    Pulse 77    Temp 98 °F (36.7 °C)    Resp 15    Ht 5' 5\" (1.651 m)    Wt 62.9 kg (138 lb 10.7 oz)    SpO2 98%    Breastfeeding No    BMI 23.08 kg/m2       No intake or output data in the 24 hours ending 07/07/17 1404     Physical Examination:             Constitutional:  No acute distress, cooperative, pleasant    ENT:  Oral mucous moist, oropharynx benign. Neck supple   Resp:  CTA bilaterally. No wheezing/rhonchi/rales. No accessory muscle use   CV:  Irregular rhythm, normal rate, no murmurs, gallops, rubs. A-flutter on tele    GI:  Soft, non distended, non tender. normoactive bowel sounds, no hepatosplenomegaly. BM 7/5    Musculoskeletal:  No edema, warm, 2+ pulses throughout    Neurologic:  Moves all extremities. AAOx3, CN II-XII reviewed. Mild RLE hemiparesis     Psych:  Good insight, Not anxious nor agitated. Data Review:    Review and/or order of clinical lab test      Labs:     No results for input(s): WBC, HGB, HCT, PLT, HGBEXT, HCTEXT, PLTEXT, HGBEXT, HCTEXT, PLTEXT in the last 72 hours.   Recent Labs      07/06/17   0232  07/05/17   0207   NA  144  139   K  4.4  3.4*   CL  115*  109*   CO2  23  22   BUN  25*  33*   CREA 1.50*  1.56*   GLU  88  99   CA  8.1*  8.0*     No results for input(s): SGOT, GPT, ALT, AP, TBIL, TBILI, TP, ALB, GLOB, GGT, AML, LPSE in the last 72 hours. No lab exists for component: AMYP, HLPSE  No results for input(s): INR, PTP, APTT in the last 72 hours. No lab exists for component: INREXT, INREXT   No results for input(s): FE, TIBC, PSAT, FERR in the last 72 hours. No results found for: FOL, RBCF   No results for input(s): PH, PCO2, PO2 in the last 72 hours. No results for input(s): CPK, CKNDX, TROIQ in the last 72 hours.     No lab exists for component: CPKMB  Lab Results   Component Value Date/Time    Cholesterol, total 145 07/01/2017 02:48 AM    HDL Cholesterol 60 07/01/2017 02:48 AM    LDL, calculated 72.8 07/01/2017 02:48 AM    Triglyceride 61 07/01/2017 02:48 AM    CHOL/HDL Ratio 2.4 07/01/2017 02:48 AM     Lab Results   Component Value Date/Time    Glucose (POC) 92 06/30/2017 06:49 AM    Glucose (POC) 90 11/02/2014 12:20 PM     Lab Results   Component Value Date/Time    Color YELLOW/STRAW 06/30/2017 08:28 AM    Appearance CLEAR 06/30/2017 08:28 AM    Specific gravity 1.014 06/30/2017 08:28 AM    pH (UA) 7.5 06/30/2017 08:28 AM    Protein NEGATIVE  06/30/2017 08:28 AM    Glucose NEGATIVE  06/30/2017 08:28 AM    Ketone NEGATIVE  06/30/2017 08:28 AM    Bilirubin NEGATIVE  06/30/2017 08:28 AM    Urobilinogen 0.2 06/30/2017 08:28 AM    Nitrites NEGATIVE  06/30/2017 08:28 AM    Leukocyte Esterase NEGATIVE  06/30/2017 08:28 AM    Epithelial cells FEW 06/30/2017 08:28 AM    Bacteria NEGATIVE  06/30/2017 08:28 AM    WBC 0-4 06/30/2017 08:28 AM    RBC 0-5 06/30/2017 08:28 AM         Medications Reviewed:     Current Facility-Administered Medications   Medication Dose Route Frequency    docusate sodium (COLACE) capsule 100 mg  100 mg Oral DAILY    bisacodyl (DULCOLAX) suppository 10 mg  10 mg Rectal DAILY PRN    metoprolol succinate (TOPROL-XL) XL tablet 50 mg  50 mg Oral DAILY    hydrALAZINE (APRESOLINE) 20 mg/mL injection 10 mg  10 mg IntraVENous Q6H PRN    aspirin (ASPIRIN) tablet 325 mg  325 mg Oral DAILY    sodium chloride (NS) flush 5-10 mL  5-10 mL IntraVENous Q8H    sodium chloride (NS) flush 5-10 mL  5-10 mL IntraVENous PRN    acetaminophen (TYLENOL) tablet 650 mg  650 mg Oral Q4H PRN    Or    acetaminophen (TYLENOL) solution 650 mg  650 mg Per NG tube Q4H PRN    Or    acetaminophen (TYLENOL) suppository 650 mg  650 mg Rectal Q4H PRN    heparin (porcine) injection 5,000 Units  5,000 Units SubCUTAneous Q8H    atorvastatin (LIPITOR) tablet 20 mg  20 mg Oral DAILY     ______________________________________________________________________  EXPECTED LENGTH OF STAY: 3d 7h  ACTUAL LENGTH OF STAY:          7                 Milady Garza V NP

## 2017-07-07 NOTE — PROGRESS NOTES
NUTRITION    Registered Dietitian Follow Up     Recommendations:   1. Weekly standing weights  2. Continue Strawberry Ensure Enlive for several weeks/months after discharge  3. Encourage PO at meals, use supplement for med passes when able    Subjective:   Visited with patient, she is happy to engage in conversation with RD    Objective:   Diet: 2gm Na, dental soft    Abd: active, soft  BM: 7/5    PO Intake over last 100 hours:   Patient Vitals for the past 100 hrs:   % Diet Eaten   07/05/17 0800 50 %   7/7: lunch 25% of meal, 100% Boost pudding, 50% Ensure Enlive  7/7: b'fast 0% - pt was upset about insurance     Labs:  Lab Results   Component Value Date/Time    Sodium 144 07/06/2017 02:32 AM    Potassium 4.4 07/06/2017 02:32 AM    Chloride 115 07/06/2017 02:32 AM    CO2 23 07/06/2017 02:32 AM    Anion gap 6 07/06/2017 02:32 AM    Glucose 88 07/06/2017 02:32 AM    BUN 25 07/06/2017 02:32 AM    Creatinine 1.50 07/06/2017 02:32 AM    Calcium 8.1 07/06/2017 02:32 AM    Magnesium 2.0 07/01/2017 02:48 AM    Phosphorus 3.8 07/01/2017 02:48 AM    Albumin 3.6 06/30/2017 06:54 AM       Blood pressure 149/89, pulse 77, temperature 98 °F (36.7 °C), resp. rate 15, height 5' 5\" (1.651 m), weight 62.9 kg (138 lb 10.7 oz), SpO2 98 %, not currently breastfeeding.      Anthropometrics: IBW : 56.7 kg (125 lb), % IBW (Calculated): 103.88 %, BMI (calculated): 23.1  Wt Readings from Last 1 Encounters:   07/07/17 62.9 kg (138 lb 10.7 oz)    Ht Readings from Last 1 Encounters:   06/30/17 5' 5\" (1.651 m)     Weight Loss Metrics 6/30/2017 9/21/2015 11/5/2014 10/29/2014 7/20/2014 12/1/2010 12/1/2010   Today's Wt 129 lb 13.6 oz 144 lb 6 oz 140 lb 6.9 oz 149 lb 11.1 oz 149 lb 4 oz 175 lb 0.7 oz 176 lb   BMI 21.61 kg/m2 24.03 kg/m2 27.57 kg/m2 29.23 kg/m2 29.15 kg/m2 30.03 kg/m2 31.18 kg/m2     Estimated Nutrition Needs: 1240 Kcals/day (1240-1340kcal), Protein (g): 65 g (65-71g (1.1-1.2g/kg)) Fluid (ml): 1500 ml  Based on:   [x]  Actual admission wt (58.9 kg)    []    IBW   []   Adjusted BW      Assessment:   Meal intake is up and down, pt eats small portions, does very much like the fortified pudding and strawberry Enlive, pt has been accepted to rehab and placement is pending insurance and bed availability. Lower extremity edema is now trace, pt is improving ROM each day (per her report). Appetite stim was not tried as previously recommended likely to protect kidney function. Pt does benefit from frequent reminders to take sips. Wt appears up ~9# since admission, not edema. Education & Cultural Needs:   [x]  No cultural, Synagogue, or ethnic dietary needs identified    []  Cultural, Synagogue and ethnic food preferences identified and addressed    []  Participated in care plan, discharge planning/Interdisciplinary rounds     Nutrition Diagnoses   1. Malnutrition (Inadequate protein/energy intake) related to poor appetite as evidenced by decreased intake x 6 months; cachexia w/ temporal wasting    Nutrition Interventions:  Intervention :Food/Nutrient Delivery: Yes  Supplements: Commercial supplement (Ensure BID, Boost Pudding)    Goal:   1. Consumption of at least 50% meals and 1-2 supplements/day in 5-7 days;   2. wt maintenance    Nutrition Monitoring and Evaluation:  Previous Recs: [x]  Implemented  [] Not Implemented (RD to address) [] Not applicable     Previous Goal:  [x]  Progressing  []  Not Met (RD to address)[] Not applicable     Plan:   Added supplement recommendations to discharge instructions to continue use once at rehab.    Encourage PO, monitor progress and recheck weight      Lazarus Johns RD, MS, CDE

## 2017-07-08 LAB
ERYTHROCYTE [DISTWIDTH] IN BLOOD BY AUTOMATED COUNT: 15.2 % (ref 11.5–14.5)
GLUCOSE BLD STRIP.AUTO-MCNC: 114 MG/DL (ref 65–100)
HCT VFR BLD AUTO: 31.4 % (ref 35–47)
HGB BLD-MCNC: 10.1 G/DL (ref 11.5–16)
MCH RBC QN AUTO: 29.6 PG (ref 26–34)
MCHC RBC AUTO-ENTMCNC: 32.2 G/DL (ref 30–36.5)
MCV RBC AUTO: 92.1 FL (ref 80–99)
PLATELET # BLD AUTO: 257 K/UL (ref 150–400)
RBC # BLD AUTO: 3.41 M/UL (ref 3.8–5.2)
SERVICE CMNT-IMP: ABNORMAL
WBC # BLD AUTO: 92.8 K/UL (ref 3.6–11)

## 2017-07-08 PROCEDURE — 74011250637 HC RX REV CODE- 250/637: Performed by: INTERNAL MEDICINE

## 2017-07-08 PROCEDURE — 82962 GLUCOSE BLOOD TEST: CPT

## 2017-07-08 PROCEDURE — 74011250637 HC RX REV CODE- 250/637: Performed by: HOSPITALIST

## 2017-07-08 PROCEDURE — 85027 COMPLETE CBC AUTOMATED: CPT | Performed by: HOSPITALIST

## 2017-07-08 PROCEDURE — 65660000000 HC RM CCU STEPDOWN

## 2017-07-08 PROCEDURE — 36415 COLL VENOUS BLD VENIPUNCTURE: CPT | Performed by: HOSPITALIST

## 2017-07-08 PROCEDURE — 74011250636 HC RX REV CODE- 250/636: Performed by: INTERNAL MEDICINE

## 2017-07-08 PROCEDURE — 74011250637 HC RX REV CODE- 250/637: Performed by: PSYCHIATRY & NEUROLOGY

## 2017-07-08 PROCEDURE — 97116 GAIT TRAINING THERAPY: CPT

## 2017-07-08 RX ADMIN — HEPARIN SODIUM 5000 UNITS: 5000 INJECTION, SOLUTION INTRAVENOUS; SUBCUTANEOUS at 09:47

## 2017-07-08 RX ADMIN — ATORVASTATIN CALCIUM 20 MG: 20 TABLET, FILM COATED ORAL at 09:47

## 2017-07-08 RX ADMIN — HEPARIN SODIUM 5000 UNITS: 5000 INJECTION, SOLUTION INTRAVENOUS; SUBCUTANEOUS at 02:35

## 2017-07-08 RX ADMIN — HEPARIN SODIUM 5000 UNITS: 5000 INJECTION, SOLUTION INTRAVENOUS; SUBCUTANEOUS at 18:44

## 2017-07-08 RX ADMIN — Medication 325 MG: at 09:47

## 2017-07-08 RX ADMIN — Medication 10 ML: at 22:49

## 2017-07-08 RX ADMIN — Medication 10 ML: at 14:39

## 2017-07-08 RX ADMIN — METOPROLOL SUCCINATE 50 MG: 50 TABLET, EXTENDED RELEASE ORAL at 09:47

## 2017-07-08 RX ADMIN — Medication 10 ML: at 06:40

## 2017-07-08 NOTE — PROGRESS NOTES
Problem: Mobility Impaired (Adult and Pediatric)  Goal: *Acute Goals and Plan of Care (Insert Text)  Physical Therapy Goals  Initiated 7/2/2017  1. Patient will move from supine to sit and sit to supine in bed with minimal assistance within 7 day(s). 2. Patient will transfer from bed to chair and chair to bed with moderate assistance using the least restrictive device within 7 day(s). 3. Patient will perform sit to stand with minimal assistance within 7 day(s). 4. Patient will ambulate with moderate assistance for 15 feet with the least restrictive device within 7 day(s). 5. Pt will increase Rice Balance score by 4-5 points to decrease risk of falls within 7 days. PHYSICAL THERAPY TREATMENT  Patient: Srikanth Avilez (29 y.o. female)  Date: 7/8/2017  Diagnosis: Acute CVA (cerebrovascular accident) [facility]) Cerebrovascular accident (CVA) due to thrombosis of left middle cerebral artery [facility])       Precautions: Fall      ASSESSMENT:  Patient received supine in bed and agreeable to therapy. Patient's nurse reported she had attempted to get OOB on her own this morning. When asked patient if this was true, patient responded \"yes. \" Educated patient on the importance of calling for assistance prior to OOB for safety. Patient verablized understanding. Patient tolerated session well and making good progress towards goals. Patient completed supine to sit with min assist an increased time to complete due to decreased initiation to mobilize towards EOB. Patient performed sit<>stand with min assist x 2 and noted increased posterior trunk leaning and relied on HHA x 2 for support. Patient able to correct posture with verbal cues and tactile cues at trunk and pelvis for trunk extension. Patient ambulated 60 feet with min-mod assist x 2 and HHA. Patient with decreased heel strike on the R, fair quad control on the R during stance phase, narrow JACKIE; required increased time and multiple steps to complete turns.  Pt returned to seated position in chair and noted fair eccentric control when assuming seated position. Patient will continue to benefit from PT. Pt highly motivated to return to prior level of function. Patient remains an excellent inpatient rehab candidate to improve strength, balance and gait as well as safety awareness. Progression toward goals:  [X]       Improving appropriately and progressing toward goals  [ ]       Improving slowly and progressing toward goals  [ ]       Not making progress toward goals and plan of care will be adjusted       PLAN:  Patient continues to benefit from skilled intervention to address the above impairments. Continue treatment per established plan of care. Discharge Recommendations:  Inpatient Rehab  Further Equipment Recommendations for Discharge:  TBD at rehab       SUBJECTIVE:   Patient stated I need to get this leg stronger! Yaakov Antunez      OBJECTIVE DATA SUMMARY:   Critical Behavior:  Neurologic State: Alert, Eyes open spontaneously  Orientation Level: Oriented to person, Oriented to place, Disoriented to situation, Disoriented to time  Cognition: Follows commands  Safety/Judgement: Awareness of environment  Functional Mobility Training:  Bed Mobility:     Supine to Sit: Minimum assistance; Additional time              Transfers:  Sit to Stand: Minimum assistance;Assist x2  Stand to Sit: Minimum assistance;Assist x2                             Balance:  Sitting: Intact  Sitting - Static: Good (unsupported)  Sitting - Dynamic: Fair (occasional)  Standing: Impaired  Standing - Static: Fair;Constant support  Standing - Dynamic : Poor  Ambulation/Gait Training:  Distance (ft): 60 Feet (ft)  Assistive Device: Gait belt (HHA x 2)  Ambulation - Level of Assistance: Moderate assistance;Minimal assistance        Gait Abnormalities: Decreased step clearance; Hemiplegic        Base of Support: Narrowed     Speed/Soraya: Slow  Step Length: Right shortened;Left shortened  Swing Pattern: Right asymmetrical Pain:  Pain Scale 1: Numeric (0 - 10)  Pain Intensity 1: 0              Activity Tolerance:   Good. VSS  Please refer to the flowsheet for vital signs taken during this treatment. After treatment:   [X] Patient left in no apparent distress sitting up in chair  [ ] Patient left in no apparent distress in bed  [X] Call bell left within reach  [X] Nursing notified  [ ] Caregiver present  [X] Bed alarm activated      COMMUNICATION/EDUCATION:   The patients plan of care was discussed with: Registered Nurse.     [X]  Fall prevention education was provided and the patient/caregiver indicated understanding. [X]  Patient/family have participated as able in goal setting and plan of care. [X]  Patient/family agree to work toward stated goals and plan of care. [ ]  Patient understands intent and goals of therapy, but is neutral about his/her participation. [ ]  Patient is unable to participate in goal setting and plan of care.      Thank you for this referral.  Radha Estrada, PT , DPT   Time Calculation: 13 mins

## 2017-07-08 NOTE — PROGRESS NOTES
Bedside shift change report given to Carisa (oncoming nurse) by Amanda Cabrera (offgoing nurse). Report included the following information SBAR, Kardex, Intake/Output, MAR, Accordion, Recent Results and Alarm Parameters .

## 2017-07-08 NOTE — PROGRESS NOTES
NP and pt had discussion and requested referral be sent to [address] [place] [address]. Initial preference was for [facility] rehab however, facility has received referral as of this past Monday, 5 days ago, with no outcome on authorization from [facility]. Unclear of status of auth with [facility] at this time but requested update. 11:30am  Received response from alycia Mari from [facility] that initial information for facesheet for [employer] policy was incorrect and the corrected card information was received by [facility] on Thursday by pt's granddaughter. So Donny Chakrabortyt was initiated on Thursday and is still pending.     REGINA Henriquez

## 2017-07-08 NOTE — PROGRESS NOTES
Hospitalist Progress Note  Antoinette Lafleur NP  Office: [phone]  Cell: [phone]      Date of Service:  2017  NAME:  Hallie Pedroza  :  10/20/1932  MRN:  [id number]      Admission Summary:   The patient is an 28-year-old [facility] [employer] female with past medical history of hypertension, hypercholesterolemia, CLL, a-fib and not on [address], previous cva,  who came to the emergency room complaining of right lower extremity weakness x2 days. The patient reported that 2 days pta on , she started having some difficulties with walking because of right lower extremity weakness. Interval history / Subjective:   Pt w/o complaints, still waiting for ins authorization. Accepted to [facility] but no female beds available. Pt would like referral sent to [address] [place] [address]. [facility]. Pt w/o acute complaints     Assessment & Plan:     L>R hemispheric punctate infarcts (POA)  - most c/w cardioembolic source  - no significant vessel stenosis/occlusionon CTA   - per neurology: concern for possible cerebral amyloid angiopathy given multifocal remote microhemorrhages on imaging. Hesitant to initiate [address] and would elect to continue with ASA monotherapy for stroke prevention. - continue asa/statin  - evaluated by speech, dental soft, thin liquids    Atrial flutter  - ef 40-45%,  severehypokinesis of the basal-mid anteroseptal and apical septal wall(s). Moderate concentric hypertrophy  -  cardiology agrees to stop Digoxin. Continue metoprolol, rate controlled.     KEVIN on CKD III (POA)  - avoid nephrotoxic agents, was on furosemide at home  - GFR from 2017 is 52 and from 10/2016 is also 52, per PCP lab work,on chart  -  Cr improved to 1.50    Chronic severe protien calorie malnutrition  - severe muscle wasting, loss of subcutaneous fat, nutritional intake < 75% for > 1 month  - evaluated by nutrition    Hypertension  - chronic, stable    History of CLL  - follows with Dr. Delonte Pederson q 6 months      Code Status : Full code   Care Plan discussed with: patient, family, nurse, attending MD  Disposition: Rehab, needs prior [facility] Problems  Date Reviewed: 6/30/2017          Codes Class Noted POA    * (Principal)Cerebrovascular accident (CVA) due to thrombosis of left middle cerebral artery ([facility] ICD-10-CM: R26.263  ICD-9-CM: 434.01  6/30/2017 Yes        CLL (chronic lymphocytic leukemia) ([facility] ICD-10-CM: C91.10  ICD-9-CM: 204.10  Unknown Yes        CHF (congestive heart failure) ([facility] ICD-10-CM: I50.9  ICD-9-CM: 428.0  10/24/2014 Yes        HTN (hypertension) ICD-10-CM: I10  ICD-9-CM: 401.9  1/13/2010 Yes        Mixed hyperlipidemia ICD-10-CM: R83.8  ICD-9-CM: 272.2  1/13/2010 Yes                Review of Systems:   A comprehensive review of systems was negative except for that written in the HPI. Vital Signs:    Last 24hrs VS reviewed since prior progress note. Most recent are:  Visit Vitals    BP (!) 148/98 (BP 1 Location: Right arm, BP Patient Position: At rest)    Pulse 81    Temp 98.6 °F (37 °C)    Resp 16    Ht 5' 5\" (1.651 m)    Wt 62.8 kg (138 lb 7.2 oz)    SpO2 98%    Breastfeeding No    BMI 23.04 kg/m2       No intake or output data in the 24 hours ending 07/08/17 1006     Physical Examination:             Constitutional:  No acute distress, cooperative, pleasant    ENT:  Oral mucous moist, oropharynx benign. Neck supple   Resp:  CTA bilaterally. No wheezing/rhonchi/rales. No accessory muscle use   CV:  Irregular rhythm, normal rate, no murmurs, gallops, rubs. A-flutter on tele    GI:  Soft, non distended, non tender. normoactive bowel sounds, no hepatosplenomegaly. BM 7/5    Musculoskeletal:  No edema, warm, 2+ pulses throughout    Neurologic:  Moves all extremities. AAOx3, CN II-XII reviewed. Mild RLE hemiparesis     Psych:  Good insight, Not anxious nor agitated.        Data Review:    Review and/or order of clinical lab test      Labs:     Recent Labs      07/08/17   0234   WBC  92.8*   HGB  10.1*   HCT  31.4*   PLT  257     Recent Labs      07/06/17   0232   NA  144   K  4.4   CL  115*   CO2  23   BUN  25*   CREA  1.50*   GLU  88   CA  8.1*     No results for input(s): SGOT, GPT, ALT, AP, TBIL, TBILI, TP, ALB, GLOB, GGT, AML, LPSE in the last 72 hours. No lab exists for component: AMYP, HLPSE  No results for input(s): INR, PTP, APTT in the last 72 hours. No lab exists for component: INREXT, INREXT   No results for input(s): FE, TIBC, PSAT, FERR in the last 72 hours. No results found for: FOL, RBCF   No results for input(s): PH, PCO2, PO2 in the last 72 hours. No results for input(s): CPK, CKNDX, TROIQ in the last 72 hours.     No lab exists for component: CPKMB  Lab Results   Component Value Date/Time    Cholesterol, total 145 07/01/2017 02:48 AM    HDL Cholesterol 60 07/01/2017 02:48 AM    LDL, calculated 72.8 07/01/2017 02:48 AM    Triglyceride 61 07/01/2017 02:48 AM    CHOL/HDL Ratio 2.4 07/01/2017 02:48 AM     Lab Results   Component Value Date/Time    Glucose (POC) 92 06/30/2017 06:49 AM    Glucose (POC) 90 11/02/2014 12:20 PM     Lab Results   Component Value Date/Time    Color YELLOW/STRAW 06/30/2017 08:28 AM    Appearance CLEAR 06/30/2017 08:28 AM    Specific gravity 1.014 06/30/2017 08:28 AM    pH (UA) 7.5 06/30/2017 08:28 AM    Protein NEGATIVE  06/30/2017 08:28 AM    Glucose NEGATIVE  06/30/2017 08:28 AM    Ketone NEGATIVE  06/30/2017 08:28 AM    Bilirubin NEGATIVE  06/30/2017 08:28 AM    Urobilinogen 0.2 06/30/2017 08:28 AM    Nitrites NEGATIVE  06/30/2017 08:28 AM    Leukocyte Esterase NEGATIVE  06/30/2017 08:28 AM    Epithelial cells FEW 06/30/2017 08:28 AM    Bacteria NEGATIVE  06/30/2017 08:28 AM    WBC 0-4 06/30/2017 08:28 AM    RBC 0-5 06/30/2017 08:28 AM         Medications Reviewed:     Current Facility-Administered Medications   Medication Dose Route Frequency    docusate sodium (COLACE) capsule 100 mg  100 mg Oral DAILY    bisacodyl (DULCOLAX) suppository 10 mg  10 mg Rectal DAILY PRN    metoprolol succinate (TOPROL-XL) XL tablet 50 mg  50 mg Oral DAILY    hydrALAZINE (APRESOLINE) 20 mg/mL injection 10 mg  10 mg IntraVENous Q6H PRN    aspirin (ASPIRIN) tablet 325 mg  325 mg Oral DAILY    sodium chloride (NS) flush 5-10 mL  5-10 mL IntraVENous Q8H    sodium chloride (NS) flush 5-10 mL  5-10 mL IntraVENous PRN    acetaminophen (TYLENOL) tablet 650 mg  650 mg Oral Q4H PRN    Or    acetaminophen (TYLENOL) solution 650 mg  650 mg Per NG tube Q4H PRN    Or    acetaminophen (TYLENOL) suppository 650 mg  650 mg Rectal Q4H PRN    heparin (porcine) injection 5,000 Units  5,000 Units SubCUTAneous Q8H    atorvastatin (LIPITOR) tablet 20 mg  20 mg Oral DAILY     ______________________________________________________________________  EXPECTED LENGTH OF STAY: 3d 7h  ACTUAL LENGTH OF STAY:          8                 Milady WATERS NP

## 2017-07-09 LAB
ANION GAP BLD CALC-SCNC: 6 MMOL/L (ref 5–15)
BUN SERPL-MCNC: 17 MG/DL (ref 6–20)
BUN/CREAT SERPL: 13 (ref 12–20)
CALCIUM SERPL-MCNC: 8.5 MG/DL (ref 8.5–10.1)
CHLORIDE SERPL-SCNC: 110 MMOL/L (ref 97–108)
CO2 SERPL-SCNC: 27 MMOL/L (ref 21–32)
CREAT SERPL-MCNC: 1.34 MG/DL (ref 0.55–1.02)
GLUCOSE SERPL-MCNC: 74 MG/DL (ref 65–100)
POTASSIUM SERPL-SCNC: 5 MMOL/L (ref 3.5–5.1)
SODIUM SERPL-SCNC: 143 MMOL/L (ref 136–145)

## 2017-07-09 PROCEDURE — 74011250637 HC RX REV CODE- 250/637: Performed by: INTERNAL MEDICINE

## 2017-07-09 PROCEDURE — 74011250637 HC RX REV CODE- 250/637: Performed by: NURSE PRACTITIONER

## 2017-07-09 PROCEDURE — 74011250637 HC RX REV CODE- 250/637: Performed by: PSYCHIATRY & NEUROLOGY

## 2017-07-09 PROCEDURE — 36415 COLL VENOUS BLD VENIPUNCTURE: CPT

## 2017-07-09 PROCEDURE — 80048 BASIC METABOLIC PNL TOTAL CA: CPT

## 2017-07-09 PROCEDURE — 74011250636 HC RX REV CODE- 250/636: Performed by: INTERNAL MEDICINE

## 2017-07-09 PROCEDURE — 65660000000 HC RM CCU STEPDOWN

## 2017-07-09 PROCEDURE — 74011250637 HC RX REV CODE- 250/637: Performed by: HOSPITALIST

## 2017-07-09 RX ADMIN — Medication 325 MG: at 08:32

## 2017-07-09 RX ADMIN — METOPROLOL SUCCINATE 50 MG: 50 TABLET, EXTENDED RELEASE ORAL at 07:44

## 2017-07-09 RX ADMIN — Medication 10 ML: at 06:22

## 2017-07-09 RX ADMIN — HEPARIN SODIUM 5000 UNITS: 5000 INJECTION, SOLUTION INTRAVENOUS; SUBCUTANEOUS at 03:29

## 2017-07-09 RX ADMIN — DOCUSATE SODIUM 100 MG: 100 CAPSULE, LIQUID FILLED ORAL at 08:32

## 2017-07-09 RX ADMIN — Medication 10 ML: at 23:17

## 2017-07-09 RX ADMIN — HEPARIN SODIUM 5000 UNITS: 5000 INJECTION, SOLUTION INTRAVENOUS; SUBCUTANEOUS at 17:25

## 2017-07-09 RX ADMIN — HEPARIN SODIUM 5000 UNITS: 5000 INJECTION, SOLUTION INTRAVENOUS; SUBCUTANEOUS at 11:36

## 2017-07-09 RX ADMIN — ATORVASTATIN CALCIUM 20 MG: 20 TABLET, FILM COATED ORAL at 08:32

## 2017-07-09 NOTE — PROGRESS NOTES
Bedside and Verbal shift change report given to Danica (oncoming nurse) by [address] (offgoing nurse). Report included the following information SBAR, Kardex and Recent Results.

## 2017-07-09 NOTE — PROGRESS NOTES
0730:Spoke with Dr. Caren Nair regarding patient's /113. New order to give 9am dose of metoprolol now.

## 2017-07-09 NOTE — PROGRESS NOTES
Hospitalist Progress Note  Chandler Lundberg NP  Office: [phone]  Cell: [phone]      Date of Service:  2017  NAME:  Nick De  :  10/20/1932  MRN:  [id number]      Admission Summary:   The patient is an 80-year-old [facility] [employer] female with past medical history of hypertension, hypercholesterolemia, CLL, a-fib and not on [address], previous cva,  who came to the emergency room complaining of right lower extremity weakness x2 days. The patient reported that 2 days pta on , she started having some difficulties with walking because of right lower extremity weakness. Interval history / Subjective:   Pt in bed eating, no acute distress, offers no complaints. Aflutter on monitor. Pending ins authorization still and bed at [facility]. Delay is attributable to wrong insurance information. Assessment & Plan:     L>R hemispheric punctate infarcts (POA)  - most c/w cardioembolic source  - no significant vessel stenosis/occlusionon CTA   - per neurology: concern for possible cerebral amyloid angiopathy given multifocal remote microhemorrhages on imaging. Hesitant to initiate [address] and would elect to continue with ASA monotherapy for stroke prevention. - continue asa/statin  - evaluated by speech, dental soft, thin liquids    Atrial flutter  - ef 40-45%,  severehypokinesis of the basal-mid anteroseptal and apical septal wall(s). Moderate concentric hypertrophy  -  cardiology agrees to stop Digoxin. Continue metoprolol, rate controlled.     KEVIN on CKD III (POA)  - avoid nephrotoxic agents, was on furosemide at home  - GFR from 2017 is 52 and from 10/2016 is also 52, per PCP lab work,on chart  -  Cr improved to 1.50  -  Cr 1.34, gfr 46, close to baseline    Chronic severe protien calorie malnutrition  - severe muscle wasting, loss of subcutaneous fat, nutritional intake < 75% for > 1 month  - evaluated by nutrition    Hypertension   - chronic, stable    History of CLL  - follows with Dr. Berta Davis q 6 months      Code Status : Full code   Care Plan discussed with: patient, family, nurse, attending MD  Disposition: Rehab, needs prior [facility] Problems  Date Reviewed: 6/30/2017          Codes Class Noted POA    * (Principal)Cerebrovascular accident (CVA) due to thrombosis of left middle cerebral artery ([facility] ICD-10-CM: F25.159  ICD-9-CM: 434.01  6/30/2017 Yes        CLL (chronic lymphocytic leukemia) ([facility] ICD-10-CM: C91.10  ICD-9-CM: 204.10  Unknown Yes        [facility] failure) [facility] ICD-10-CM: I50.9  ICD-9-CM: 428.0  10/24/2014 Yes        HTN (hypertension) ICD-10-CM: I10  ICD-9-CM: 401.9  1/13/2010 Yes        Mixed hyperlipidemia ICD-10-CM: E61.8  ICD-9-CM: 272.2  1/13/2010 Yes                Review of Systems:   A comprehensive review of systems was negative except for that written in the HPI. Vital Signs:    Last 24hrs VS reviewed since prior progress note. Most recent are:  Visit Vitals    BP (!) 157/113    Pulse 77    Temp 97.9 °F (36.6 °C)    Resp 13    Ht 5' 5\" (1.651 m)    Wt 66.5 kg (146 lb 9.7 oz)    SpO2 97%    Breastfeeding No    BMI 24.4 kg/m2       No intake or output data in the 24 hours ending 07/09/17 0941     Physical Examination:             Constitutional:  No acute distress, cooperative, pleasant    ENT:  Oral mucous moist, oropharynx benign. Neck supple   Resp:  CTA bilaterally. No wheezing/rhonchi/rales. No accessory muscle use   CV:  Irregular rhythm, normal rate, no murmurs, gallops, rubs. A-flutter on tele    GI:  Soft, non distended, non tender. normoactive bowel sounds, no hepatosplenomegaly. Musculoskeletal:  No edema, warm, 2+ pulses throughout    Neurologic:  Moves all extremities. AAOx3, CN II-XII reviewed. Mild RLE hemiparesis     Psych:  Good insight, Not anxious nor agitated.        Data Review:    Review and/or order of clinical lab test      Labs:     Recent Labs      07/08/17   0234   WBC  92.8*   HGB  10.1*   HCT  31.4*   PLT  257     Recent Labs      07/09/17   0615   NA  143   K  5.0   CL  110*   CO2  27   BUN  17   CREA  1.34*   GLU  74   CA  8.5     No results for input(s): SGOT, GPT, ALT, AP, TBIL, TBILI, TP, ALB, GLOB, GGT, AML, LPSE in the last 72 hours. No lab exists for component: AMYP, HLPSE  No results for input(s): INR, PTP, APTT in the last 72 hours. No lab exists for component: INREXT, INREXT   No results for input(s): FE, TIBC, PSAT, FERR in the last 72 hours. No results found for: FOL, RBCF   No results for input(s): PH, PCO2, PO2 in the last 72 hours. No results for input(s): CPK, CKNDX, TROIQ in the last 72 hours.     No lab exists for component: CPKMB  Lab Results   Component Value Date/Time    Cholesterol, total 145 07/01/2017 02:48 AM    HDL Cholesterol 60 07/01/2017 02:48 AM    LDL, calculated 72.8 07/01/2017 02:48 AM    Triglyceride 61 07/01/2017 02:48 AM    CHOL/HDL Ratio 2.4 07/01/2017 02:48 AM     Lab Results   Component Value Date/Time    Glucose (POC) 114 07/08/2017 09:43 PM    Glucose (POC) 92 06/30/2017 06:49 AM    Glucose (POC) 90 11/02/2014 12:20 PM     Lab Results   Component Value Date/Time    Color YELLOW/STRAW 06/30/2017 08:28 AM    Appearance CLEAR 06/30/2017 08:28 AM    Specific gravity 1.014 06/30/2017 08:28 AM    pH (UA) 7.5 06/30/2017 08:28 AM    Protein NEGATIVE  06/30/2017 08:28 AM    Glucose NEGATIVE  06/30/2017 08:28 AM    Ketone NEGATIVE  06/30/2017 08:28 AM    Bilirubin NEGATIVE  06/30/2017 08:28 AM    Urobilinogen 0.2 06/30/2017 08:28 AM    Nitrites NEGATIVE  06/30/2017 08:28 AM    Leukocyte Esterase NEGATIVE  06/30/2017 08:28 AM    Epithelial cells FEW 06/30/2017 08:28 AM    Bacteria NEGATIVE  06/30/2017 08:28 AM    WBC 0-4 06/30/2017 08:28 AM    RBC 0-5 06/30/2017 08:28 AM         Medications Reviewed:     Current Facility-Administered Medications   Medication Dose Route Frequency    docusate sodium (COLACE) capsule 100 mg  100 mg Oral DAILY    bisacodyl (DULCOLAX) suppository 10 mg  10 mg Rectal DAILY PRN    metoprolol succinate (TOPROL-XL) XL tablet 50 mg  50 mg Oral DAILY    hydrALAZINE (APRESOLINE) 20 mg/mL injection 10 mg  10 mg IntraVENous Q6H PRN    aspirin (ASPIRIN) tablet 325 mg  325 mg Oral DAILY    sodium chloride (NS) flush 5-10 mL  5-10 mL IntraVENous Q8H    sodium chloride (NS) flush 5-10 mL  5-10 mL IntraVENous PRN    acetaminophen (TYLENOL) tablet 650 mg  650 mg Oral Q4H PRN    Or    acetaminophen (TYLENOL) solution 650 mg  650 mg Per NG tube Q4H PRN    Or    acetaminophen (TYLENOL) suppository 650 mg  650 mg Rectal Q4H PRN    heparin (porcine) injection 5,000 Units  5,000 Units SubCUTAneous Q8H    atorvastatin (LIPITOR) tablet 20 mg  20 mg Oral DAILY     ______________________________________________________________________  EXPECTED LENGTH OF STAY: 3d 7h  ACTUAL LENGTH OF STAY:          9                 Milady WATERS NP

## 2017-07-09 NOTE — ROUTINE PROCESS
Bedside and Verbal shift change report given to [facility] (oncoming nurse) by Hamilton Madsen (offgoing nurse). Report included the following information SBAR, Kardex, ED Summary, Intake/Output, MAR, Recent Results and Cardiac Rhythm A-fib.

## 2017-07-10 PROCEDURE — 74011250636 HC RX REV CODE- 250/636: Performed by: NURSE PRACTITIONER

## 2017-07-10 PROCEDURE — 65270000032 HC RM SEMIPRIVATE

## 2017-07-10 PROCEDURE — 74011250636 HC RX REV CODE- 250/636: Performed by: INTERNAL MEDICINE

## 2017-07-10 PROCEDURE — 74011250637 HC RX REV CODE- 250/637: Performed by: INTERNAL MEDICINE

## 2017-07-10 PROCEDURE — 74011250637 HC RX REV CODE- 250/637: Performed by: HOSPITALIST

## 2017-07-10 PROCEDURE — 74011250637 HC RX REV CODE- 250/637: Performed by: NURSE PRACTITIONER

## 2017-07-10 PROCEDURE — 74011250637 HC RX REV CODE- 250/637: Performed by: PSYCHIATRY & NEUROLOGY

## 2017-07-10 RX ORDER — ASPIRIN 325 MG
325 TABLET ORAL DAILY
Qty: 30 TAB | Refills: 0 | Status: SHIPPED
Start: 2017-07-10 | End: 2019-01-16 | Stop reason: SDUPTHER

## 2017-07-10 RX ORDER — DOCUSATE SODIUM 100 MG/1
100 CAPSULE, LIQUID FILLED ORAL DAILY
Qty: 60 CAP | Refills: 0 | Status: SHIPPED
Start: 2017-07-10 | End: 2017-10-08

## 2017-07-10 RX ADMIN — HEPARIN SODIUM 5000 UNITS: 5000 INJECTION, SOLUTION INTRAVENOUS; SUBCUTANEOUS at 03:00

## 2017-07-10 RX ADMIN — DOCUSATE SODIUM 100 MG: 100 CAPSULE, LIQUID FILLED ORAL at 09:12

## 2017-07-10 RX ADMIN — Medication 10 ML: at 13:31

## 2017-07-10 RX ADMIN — METOPROLOL SUCCINATE 50 MG: 50 TABLET, EXTENDED RELEASE ORAL at 09:13

## 2017-07-10 RX ADMIN — HEPARIN SODIUM 5000 UNITS: 5000 INJECTION, SOLUTION INTRAVENOUS; SUBCUTANEOUS at 17:26

## 2017-07-10 RX ADMIN — ATORVASTATIN CALCIUM 20 MG: 20 TABLET, FILM COATED ORAL at 09:12

## 2017-07-10 RX ADMIN — HYDRALAZINE HYDROCHLORIDE 10 MG: 20 INJECTION INTRAMUSCULAR; INTRAVENOUS at 15:29

## 2017-07-10 RX ADMIN — Medication 10 ML: at 22:57

## 2017-07-10 RX ADMIN — Medication 10 ML: at 07:08

## 2017-07-10 RX ADMIN — Medication 325 MG: at 09:12

## 2017-07-10 NOTE — PROGRESS NOTES
Hospitalist Progress Note  Daphnie Noel MD  Office: [phone]  Cell: [phone]      Date of Service:  7/10/2017  NAME:  Ana Rosa Vasquez  :  10/20/1932  MRN:  [id number]      Admission Summary:   The patient is an 80-year-old [facility] American female with past medical history of hypertension, hypercholesterolemia, CLL, a-fib and not on [address], previous cva,  who came to the emergency room complaining of right lower extremity weakness x2 days. The patient reported that 2 days pta on , she started having some difficulties with walking because of right lower extremity weakness.     Interval history / Subjective:   No acute complaint     Assessment & Plan:     Left >Right hemispheric punctate infarcts (POA)  - most consistent with cardioembolic source  - no significant vessel stenosis/occlusionon CTA   - per neurology: concern for possible cerebral amyloid angiopathy given multifocal remote microhemorrhages on imaging.  Hesitant to initiate [address] and would elect to continue with ASA monotherapy for stroke prevention.    - continue Aspirin 325 mg po q daily and Lipitor 20 mg po q daily  - evaluated by speech, continue dental soft, thin liquids   Atrial flutter, rate controlled   - EF 40-45%,  severehypokinesis of the basal-mid anteroseptal and apical septal wall(s). Moderate concentric hypertrophy  -  cardiology agrees to stop Digoxin. Continue metoprolol, rate controlled.   KEVIN on CKD III (POA)  - avoid nephrotoxic agents, was on furosemide at home  - GFR from 2017 is 52 and from 10/2016 is also 52, per PCP lab work, on chart  -  Cr improved to 1.32  -continue outpatient follow up   Chronic severe protien calorie malnutrition  - severe muscle wasting, loss of subcutaneous fat, nutritional intake < 75% for > 1 month  - evaluated by nutrition   Hypertension  - chronic, stable, continue metoprolol   History of CLL  - follows with  Matias Caldera q 6 months         Code status: Full Code  DVT prophylaxis: heparin    Care Plan discussed with: Patient/Family, Nurse and   Disposition: SNF/LTC     Hospital Problems  Date Reviewed: 6/30/2017          Codes Class Noted POA    * (Principal)Cerebrovascular accident (CVA) due to thrombosis of left middle cerebral artery ([facility] ICD-10-CM: W91.754  ICD-9-CM: 434.01  6/30/2017 Yes        CLL (chronic lymphocytic leukemia) ([facility] ICD-10-CM: C91.10  ICD-9-CM: 204.10  Unknown Yes        CHF (congestive heart failure) ([facility] ICD-10-CM: I50.9  ICD-9-CM: 428.0  10/24/2014 Yes        HTN (hypertension) ICD-10-CM: I10  ICD-9-CM: 401.9  1/13/2010 Yes        Mixed hyperlipidemia ICD-10-CM: I18.3  ICD-9-CM: 272.2  1/13/2010 Yes              Vital Signs:    Last 24hrs VS reviewed since prior progress note. Most recent are:  Visit Vitals    /82 (BP 1 Location: Left arm, BP Patient Position: At rest)    Pulse 79    Temp 97.9 °F (36.6 °C)    Resp 15    Ht 5' 5\" (1.651 m)    Wt 66.4 kg (146 lb 6.2 oz)    SpO2 97%    Breastfeeding No    BMI 24.36 kg/m2         Intake/Output Summary (Last 24 hours) at 07/10/17 1111  Last data filed at 07/09/17 1725   Gross per 24 hour   Intake              240 ml   Output                0 ml   Net              240 ml        Physical Examination:             Constitutional:  No acute distress, cooperative, pleasant    ENT:  Oral mucous moist, oropharynx benign. Neck supple,    Resp:  CTA bilaterally. No wheezing/rhonchi/rales. No accessory muscle use   CV:  Regular rhythm, normal rate, no murmurs, gallops, rubs    GI:  Soft, non distended, non tender. normoactive bowel sounds, no hepatosplenomegaly     Musculoskeletal:  No edema    Neurologic:  Moves all extremities. AAOx3, CN II-XII reviewed     Psych:  Good insight, Not anxious nor agitated.        Data Review:    Review and/or order of clinical lab test      Labs:     Recent Labs      07/08/17   0234   WBC  92.8*   HGB 10.1*   HCT  31.4*   PLT  257     Recent Labs      07/09/17   0615   NA  143   K  5.0   CL  110*   CO2  27   BUN  17   CREA  1.34*   GLU  74   CA  8.5     No results for input(s): SGOT, GPT, ALT, AP, TBIL, TBILI, TP, ALB, GLOB, GGT, AML, LPSE in the last 72 hours. No lab exists for component: AMYP, HLPSE  No results for input(s): INR, PTP, APTT in the last 72 hours. No lab exists for component: INREXT   No results for input(s): FE, TIBC, PSAT, FERR in the last 72 hours. No results found for: FOL, RBCF   No results for input(s): PH, PCO2, PO2 in the last 72 hours. No results for input(s): CPK, CKNDX, TROIQ in the last 72 hours.     No lab exists for component: CPKMB  Lab Results   Component Value Date/Time    Cholesterol, total 145 07/01/2017 02:48 AM    HDL Cholesterol 60 07/01/2017 02:48 AM    LDL, calculated 72.8 07/01/2017 02:48 AM    Triglyceride 61 07/01/2017 02:48 AM    CHOL/HDL Ratio 2.4 07/01/2017 02:48 AM     Lab Results   Component Value Date/Time    Glucose (POC) 114 07/08/2017 09:43 PM    Glucose (POC) 92 06/30/2017 06:49 AM    Glucose (POC) 90 11/02/2014 12:20 PM     Lab Results   Component Value Date/Time    Color YELLOW/STRAW 06/30/2017 08:28 AM    Appearance CLEAR 06/30/2017 08:28 AM    Specific gravity 1.014 06/30/2017 08:28 AM    pH (UA) 7.5 06/30/2017 08:28 AM    Protein NEGATIVE  06/30/2017 08:28 AM    Glucose NEGATIVE  06/30/2017 08:28 AM    Ketone NEGATIVE  06/30/2017 08:28 AM    Bilirubin NEGATIVE  06/30/2017 08:28 AM    Urobilinogen 0.2 06/30/2017 08:28 AM    Nitrites NEGATIVE  06/30/2017 08:28 AM    Leukocyte Esterase NEGATIVE  06/30/2017 08:28 AM    Epithelial cells FEW 06/30/2017 08:28 AM    Bacteria NEGATIVE  06/30/2017 08:28 AM    WBC 0-4 06/30/2017 08:28 AM    RBC 0-5 06/30/2017 08:28 AM         Medications Reviewed:     Current Facility-Administered Medications   Medication Dose Route Frequency    docusate sodium (COLACE) capsule 100 mg  100 mg Oral DAILY    bisacodyl (DULCOLAX) suppository 10 mg  10 mg Rectal DAILY PRN    metoprolol succinate (TOPROL-XL) XL tablet 50 mg  50 mg Oral DAILY    hydrALAZINE (APRESOLINE) 20 mg/mL injection 10 mg  10 mg IntraVENous Q6H PRN    aspirin (ASPIRIN) tablet 325 mg  325 mg Oral DAILY    sodium chloride (NS) flush 5-10 mL  5-10 mL IntraVENous Q8H    sodium chloride (NS) flush 5-10 mL  5-10 mL IntraVENous PRN    acetaminophen (TYLENOL) tablet 650 mg  650 mg Oral Q4H PRN    Or    acetaminophen (TYLENOL) solution 650 mg  650 mg Per NG tube Q4H PRN    Or    acetaminophen (TYLENOL) suppository 650 mg  650 mg Rectal Q4H PRN    heparin (porcine) injection 5,000 Units  5,000 Units SubCUTAneous Q8H    atorvastatin (LIPITOR) tablet 20 mg  20 mg Oral DAILY     ______________________________________________________________________  EXPECTED LENGTH OF STAY: 3d 7h  ACTUAL LENGTH OF STAY:          10                 Daphnie Noel MD

## 2017-07-10 NOTE — PROGRESS NOTES
Emailed insurance information via [employer] to the [facility] contact provided by primary RN, Rubén Zamudio. Will follow up tomorrow to ensure delivery of information.

## 2017-07-10 NOTE — DISCHARGE INSTRUCTIONS
Nutrition Recommendations for Discharge:    Continue Oral Nutrition Supplements at discharge:   Ensure Enlive or Ensure Plus Twice daily   for 30 days unless otherwise directed by your Primary Care Physician. This product can be purchased at your local grocery store or drug store and online. Likes Strawberry Flavor best    Shruthi Flanagan RD, MS, CDE   _      Nutrition Recommendations for Discharge:    Continue Oral Nutrition Supplements at discharge: Ensure or Boost Pudding Once daily   for 30 days unless otherwise directed by your Primary Care Physician. This product can be purchased at your local grocery store or drug store and online. P.O. Kenyon Mo, RD, MS, CDE             Discharge SNF/Rehab Instructions/LTAC       PATIENT ID: Amrit Brice  MRN: [id number]   YOB: 1932    DATE OF ADMISSION: 6/30/2017  6:38 AM    DATE OF DISCHARGE: 7/10/2017    PRIMARY CARE PROVIDER: Landrum Dandy, MD       ATTENDING PHYSICIAN: Fatou Arenas MD  DISCHARGING PROVIDER: Fatou Arenas MD     To contact this individual call [phone] and ask the  to page. If unavailable ask to be transferred the Adult Hospitalist Department. CONSULTATIONS: IP CONSULT TO HOSPITALIST  IP CONSULT TO NEUROLOGY  IP CONSULT TO NEUROLOGY  IP CONSULT TO CARDIOLOGY    PROCEDURES/SURGERIES: * No surgery found *    ADMITTING [address] [place] [address] COURSE:     The patient is an 80-year-old [place] American female with past medical history of hypertension, hypercholesterolemia, CLL, a-fib and not on [address] [place] [address] Road, previous cva,  who came to the emergency room complaining of right lower extremity weakness x2 days. The patient reported that 2 days pta on 6/30, she started having some difficulties with walking because of right lower extremity weakness.     Left >Right hemispheric punctate infarcts (POA)  - most consistent with cardioembolic source  - no significant vessel stenosis/occlusionon CTA   - per neurology: concern for possible cerebral amyloid angiopathy given multifocal remote microhemorrhages on imaging.  Hesitant to initiate [address] and would elect to continue with ASA monotherapy for stroke prevention.    - continue Aspirin 325 mg po q daily and Lipitor 20 mg po q daily  - evaluated by speech, continue dental soft, thin liquids   Atrial flutter, rate controlled   - EF 40-45%,  severehypokinesis of the basal-mid anteroseptal and apical septal wall(s). Moderate concentric hypertrophy  - 7/4 cardiology agrees to stop Digoxin. Continue metoprolol, rate controlled.   KEVIN on CKD III (POA)  - avoid nephrotoxic agents, was on furosemide at home  - GFR from 4/2017 is 52 and from 10/2016 is also 52, per PCP lab work, on chart  - 7/6 Cr improved to 1.32  -continue outpatient follow up   Chronic severe protien calorie malnutrition  - severe muscle wasting, loss of subcutaneous fat, nutritional intake < 75% for > 1 month  - evaluated by nutrition   Hypertension  - chronic, stable, continue metoprolol   History of CLL  - follows with Dr. Radha Alvarado q 6 months        Code Status : Full code     Randa Math / PLAN:      Left >Right hemispheric punctate infarcts (POA)    - continue Aspirin 325 mg po q daily and Lipitor 20 mg po q daily  -continue dental soft, thin liquids   Atrial flutter, rate controlled   -Continue metoprolol XL 50 mg po q daily  KEVIN on CKD III (POA)  -continue outpatient follow up   Chronic severe protien calorie malnutrition  - severe muscle wasting, loss of subcutaneous fat, nutritional intake < 75% for > 1 month  - evaluated by nutrition   Hypertension  - chronic, stable, continue metoprolol   History of CLL  - follows with Dr. Radha Alvarado q 6 months    PENDING TEST RESULTS:   At the time of discharge the following test results are still pending: none    FOLLOW UP APPOINTMENTS:    Follow-up Information     Follow up With Details Comments [address], [facility]  [phone]             ADDITIONAL CARE RECOMMENDATIONS:              DIET: Dental soft diet, Continue Oral Nutrition Supplements at discharge: Ensure Enlive or Ensure Plus Twice daily     TUBE FEEDING INSTRUCTIONS: none    OXYGEN / BiPAP SETTINGS: none    ACTIVITY: Activity as tolerated    WOUND CARE: none    EQUIPMENT needed: none      DISCHARGE MEDICATIONS:   See Medication Reconciliation Form      NOTIFY YOUR PHYSICIAN FOR ANY OF THE FOLLOWING:   Fever over 101 degrees for 24 hours. Chest pain, shortness of breath, fever, chills, nausea, vomiting, diarrhea, change in mentation, falling, weakness, bleeding. Severe pain or pain not relieved by medications. Or, any other signs or symptoms that you may have questions about.     DISPOSITION:    Home With:   OT  PT  HH  RN      x SNF/Inpatient Rehab/LTAC    Independent/assisted living    Hospice    Other:       PATIENT CONDITION AT DISCHARGE:     Functional status    Poor    x Deconditioned     Independent      Cognition    x Lucid     Forgetful     Dementia      Catheters/lines (plus indication)    Renteria     PICC     PEG    x None      Code status    x Full code     DNR      PHYSICAL EXAMINATION AT DISCHARGE:   Refer to Progress Note      CHRONIC MEDICAL DIAGNOSES:  Problem List as of 7/10/2017  Date Reviewed: 6/30/2017          Codes Class Noted - Resolved    * (Principal)Cerebrovascular accident (CVA) due to thrombosis of left middle cerebral artery ([facility] ICD-10-CM: R99.325  ICD-9-CM: 434.01  6/30/2017 - Present        CLL (chronic lymphocytic leukemia) ([facility] ICD-10-CM: C91.10  ICD-9-CM: 204.10  Unknown - Present        A-fib ([facility] ICD-10-CM: I48.91  ICD-9-CM: 427.31  10/31/2014 - Present        CHF (congestive heart failure) ([facility] ICD-10-CM: I50.9  ICD-9-CM: 428.0  10/24/2014 - Present        Lower GI bleed ICD-10-CM: K92.2  ICD-9-CM: 578.9  7/18/2014 - Present        GIB (gastrointestinal bleeding) ICD-10-CM: K92.2  ICD-9-CM: 578.9  7/18/2014 - Present        Calf pain ICD-10-CM: A40.554  ICD-9-CM: 729.5  6/23/2010 - Present        HTN (hypertension) ICD-10-CM: I10  ICD-9-CM: 401.9  1/13/2010 - Present        History of transient ischemic attack (TIA) ICD-10-CM: Z86.73  ICD-9-CM: V12.54  1/13/2010 - Present        Allergic rhinitis ICD-10-CM: J30.9  ICD-9-CM: 477.9  1/13/2010 - Present        Mixed hyperlipidemia ICD-10-CM: E78.2  ICD-9-CM: 272.2  1/13/2010 - Present        Smoker ICD-10-CM: F17.200  ICD-9-CM: 305.1  1/13/2010 - Present        RESOLVED: Hematochezia ICD-10-CM: K92.1  ICD-9-CM: 578.1  10/31/2014 - 11/5/2014        RESOLVED: ARF (acute renal failure) ([facility] ICD-10-CM: N17.9  ICD-9-CM: 584.9  10/31/2014 - 11/5/2014        RESOLVED: Hypokalemia ICD-10-CM: E87.6  ICD-9-CM: 276.8  10/31/2014 - 11/5/2014                CDMP Checked:   Yes x     PROBLEM LIST Updated:  Yes x         Signed:   Yolnada Rubin MD  7/10/2017  11:15 AM

## 2017-07-10 NOTE — PROGRESS NOTES
TN to bedside to assist with patient education. Patient BP elevated. Primary RN, Rubén Zamudio, notified.

## 2017-07-10 NOTE — PROGRESS NOTES
Cm now informed that patient has [employer] (Mamivanessa [place]). Call received from [facility] with [employer] ([phone]) stating they are not contracted with [facility] and only contracted with [address] [place] [address]. Cm met with patient to discuss this. Patient is alert and oriented x4. She stated she absolutely did not give consent to have her insurance switched to [employer]. Patient stated she has had [facility] Medicare for the past 15 years and would not have switched her insurance 'this late in the game'. Cm informed her that for now, I will send a referral to [address] [place] [address] and will reach out to her insurance for additional clarification. Message left for [place] with Mami [employer], [phone]. Jasmin DUNNE, AC    3:30 pm: Cm spoke with [place] with [facility] regarding the above. She stated that patient did give consent to have her insurance changed to Mami Wesley, and this was verbal consent given over the phone. Cm followed up with patient who provided me with a [employer] business card, stating they did meet with her but she never 'physically signed anything'. Patients' apparent correct address is [address], [facility] [facility]. This CM is not going to change this in the chart, as patient states she does not plan to live there much longer (this is the home of her niece, Ralph Heart). [facility] with [facility] stated that if [facility], that [facility] [employer] has availability. Her insurance number is: [id number], [id number] [facility]

## 2017-07-10 NOTE — ROUTINE PROCESS
TRANSFER - OUT REPORT:    Verbal report given to Daniel (name) on Chris Old  being transferred to [facility](unit) for routine progression of care       Report consisted of patients Situation, Background, Assessment and   Recommendations(SBAR). Information from the following report(s) SBAR, Kardex, Recent Results and Cardiac Rhythm Afib was reviewed with the receiving nurse. Lines:   Peripheral IV 07/08/17 Left Forearm (Active)   Site Assessment Clean, dry, & intact 7/10/2017  4:00 PM   Phlebitis Assessment 0 7/10/2017  4:00 PM   Infiltration Assessment 0 7/10/2017  4:00 PM   Dressing Status Clean, dry, & intact 7/10/2017  4:00 PM   Dressing Type Transparent;Tape 7/10/2017  4:00 PM   Hub Color/Line Status Blue;Capped;Flushed 7/10/2017  4:00 PM   Action Taken Open ports on tubing capped 7/10/2017  4:00 PM   Alcohol Cap Used Yes 7/10/2017  4:00 PM        Opportunity for questions and clarification was provided.       Patient transported with:   [facility]

## 2017-07-10 NOTE — PROGRESS NOTES
Provided current PT and OT notes to [facility] Rep to determine authorization status.

## 2017-07-10 NOTE — ROUTINE PROCESS
Bedside and Verbal shift change report given to Marisol Moran (oncoming nurse) by Carlo Laird (offgoing nurse). Report included the following information SBAR, Kardex, ED Summary, Procedure Summary, Intake/Output, MAR, Recent Results and Cardiac Rhythm A-fib,A-flutter.

## 2017-07-11 VITALS
BODY MASS INDEX: 18.91 KG/M2 | SYSTOLIC BLOOD PRESSURE: 133 MMHG | WEIGHT: 113.5 LBS | DIASTOLIC BLOOD PRESSURE: 78 MMHG | TEMPERATURE: 98.5 F | OXYGEN SATURATION: 99 % | HEART RATE: 80 BPM | RESPIRATION RATE: 21 BRPM | HEIGHT: 65 IN

## 2017-07-11 PROCEDURE — 74011250637 HC RX REV CODE- 250/637: Performed by: NURSE PRACTITIONER

## 2017-07-11 PROCEDURE — 74011250636 HC RX REV CODE- 250/636: Performed by: INTERNAL MEDICINE

## 2017-07-11 PROCEDURE — 74011250637 HC RX REV CODE- 250/637: Performed by: HOSPITALIST

## 2017-07-11 PROCEDURE — 74011250637 HC RX REV CODE- 250/637: Performed by: PSYCHIATRY & NEUROLOGY

## 2017-07-11 PROCEDURE — 97116 GAIT TRAINING THERAPY: CPT

## 2017-07-11 PROCEDURE — 74011250637 HC RX REV CODE- 250/637: Performed by: INTERNAL MEDICINE

## 2017-07-11 RX ORDER — ATORVASTATIN CALCIUM 40 MG/1
40 TABLET, FILM COATED ORAL DAILY
Qty: 30 TAB | Refills: 0 | Status: SHIPPED | OUTPATIENT
Start: 2017-07-11 | End: 2019-01-16 | Stop reason: SDUPTHER

## 2017-07-11 RX ADMIN — Medication 10 ML: at 06:24

## 2017-07-11 RX ADMIN — METOPROLOL SUCCINATE 50 MG: 50 TABLET, EXTENDED RELEASE ORAL at 08:36

## 2017-07-11 RX ADMIN — DOCUSATE SODIUM 100 MG: 100 CAPSULE, LIQUID FILLED ORAL at 08:38

## 2017-07-11 RX ADMIN — Medication 325 MG: at 08:36

## 2017-07-11 RX ADMIN — HEPARIN SODIUM 5000 UNITS: 5000 INJECTION, SOLUTION INTRAVENOUS; SUBCUTANEOUS at 03:02

## 2017-07-11 RX ADMIN — ATORVASTATIN CALCIUM 20 MG: 20 TABLET, FILM COATED ORAL at 08:38

## 2017-07-11 RX ADMIN — HEPARIN SODIUM 5000 UNITS: 5000 INJECTION, SOLUTION INTRAVENOUS; SUBCUTANEOUS at 11:43

## 2017-07-11 NOTE — PROGRESS NOTES
Spiritual Care Partner Volunteer visited patient in 2N on 7/11/17. Documented by:  Mushtaq Gallegos M.Div.    Paging Service [phone])

## 2017-07-11 NOTE — PROGRESS NOTES
Problem: Mobility Impaired (Adult and Pediatric)  Goal: *Acute Goals and Plan of Care (Insert Text)  Physical Therapy Goals  Revised 7/11/2017  1. Patient will move from supine to sit and sit to supine in bed with independence within 7 day(s). 2. Patient will transfer from bed to chair and chair to bed with supervision/set-up using the least restrictive device within 7 day(s). 3. Patient will perform sit to stand with supervision/set-up within 7 day(s). 4. Patient will ambulate with supervision/set-up for 200 feet with the least restrictive device within 7 day(s). 5. Patient will improve Rice Balance score by 7 points within 7 day(s). Initiated 7/2/2017  1. Patient will move from supine to sit and sit to supine in bed with minimal assistance within 7 day(s). 2. Patient will transfer from bed to chair and chair to bed with moderate assistance using the least restrictive device within 7 day(s). 3. Patient will perform sit to stand with minimal assistance within 7 day(s). 4. Patient will ambulate with moderate assistance for 15 feet with the least restrictive device within 7 day(s). 5. Pt will increase Rice Balance score by 4-5 points to decrease risk of falls within 7 days. PHYSICAL THERAPY TREATMENT: WEEKLY REASSESSMENT  Patient: Ana Rosa Vasquez (18 y.o. female)  Date: 7/11/2017  Diagnosis: Acute CVA (cerebrovascular accident) [facility]) Cerebrovascular accident (CVA) due to thrombosis of left middle cerebral artery [facility])       Precautions: Fall      ASSESSMENT:  Pt moving well today with improved tolerance to activity with assistive device use. Pt demonstrates narrow base of support, which she widens with cues, and a R trendelenburg hip during stance phase. Pt motivated to progress and goals have been updated to reflect progress. Recommend inpt rehab at discharge.   Patient's progression toward goals since last assessment: Goals updated to reflect progress       PLAN:  Goals have been updated based on progression since last assessment. Patient continues to benefit from skilled intervention to address the above impairments. Continue to follow the patient 5 times a week to address goals. Planned Interventions:  [X]              Bed Mobility Training             [ ]       Neuromuscular Re-Education  [X]              Transfer Training                   [ ]       Orthotic/Prosthetic Training  [X]              Gait Training                         [ ]       Modalities  [ ]              Therapeutic Exercises           [ ]       Edema Management/Control  [X]              Therapeutic Activities            [X]       Patient and Family Training/Education  [ ]              Other (comment):  Discharge Recommendations: Inpatient Rehab  Further Equipment Recommendations for Discharge: TBD after rehab       SUBJECTIVE:   Patient stated Wider feet. Wider. I can do that.       OBJECTIVE DATA SUMMARY:   Critical Behavior:  Neurologic State: Alert  Orientation Level: Disoriented to place, Disoriented to situation, Disoriented to time, Oriented to person  Cognition: Follows commands, Memory loss  Safety/Judgement: Awareness of environment     Functional Mobility Training:  Bed Mobility:  Supine to Sit: Supervision; Additional time      Transfers:  Sit to Stand: Moderate assistance (two trials, boost to clear buttocks)  Stand to Sit: Minimum assistance (cues to reach back for chair, poor descent control)         Balance:  Sitting: Intact  Standing: Impaired  Standing - Static: Fair  Standing - Dynamic : Fair  Ambulation/Gait Training:  Distance (ft): 50 Feet (ft) (distance x2)  Assistive Device: Gait belt;Walker, rolling (improved gait endurance with assistive device)  Ambulation - Level of Assistance: Minimal assistance   Gait Abnormalities: Decreased step clearance;Trendelenburg (R trend.  hip)   Base of Support: Narrowed  Stance: Right decreased  Speed/Soraya: Slow  Step Length: Left shortened;Right shortened  Swing Pattern: Right asymmetrical         Pain:  Pain Scale 1: Numeric (0 - 10)  Pain Intensity 1: 0      Activity Tolerance:   Please refer to the flowsheet for vital signs taken during this treatment.   After treatment:   [X]  Patient left in no apparent distress sitting up in chair  [ ]  Patient left in no apparent distress in bed  [X]  Call bell left within reach  [X]  Nursing notified  [X]  Caregiver present  [X]  Bed alarm activated      COMMUNICATION/COLLABORATION:   The patients plan of care was discussed with: Registered Nurse     Jacqueline Rothman PT, DPT   Time Calculation: 19 mins

## 2017-07-11 NOTE — PROGRESS NOTES
Bedside shift change report given to Han Pickard RN (oncoming nurse) by Luna Jasmine RN (offgoing nurse). Report included the following information SBAR, Kardex and MAR.

## 2017-07-11 NOTE — PROGRESS NOTES
Discharge summary/ instructions given to Jermaine Ferreira RN, at [facility] ([phone]). All questions were answered. Copies of Kardex, MAR, AVS, Discharge Summary, as well as RX x 1 & EMTALA given to chele who will be transporting patient to the facility.

## 2017-07-11 NOTE — PROGRESS NOTES
Care Management Interventions  PCP Verified by CM: Yes  Last Visit to PCP: 06/02/17  Palliative Care Consult (Criteria: CHF and RRAT>21): No  Reason for No Palliative Care Consult: Patient declined palliative services at this time  Mode of Transport at Discharge: Other (see comment) (private vehicle)  Transition of Care Consult (CM Consult): Other (Inpatient/ Acute Rehab Facility)  MyChart Signup: No  Discharge Durable Medical Equipment: No  Physical Therapy Consult: Yes  Occupational Therapy Consult: Yes  Speech Therapy Consult: Yes  Current Support Network: Own Home  Confirm Follow Up Transport: Family (Patient is expected to need ambulance transport.)  Plan discussed with Pt/Family/Caregiver: Yes  Freedom of Choice Offered: Yes  Discharge Location  Discharge Placement: Rehab hospital/unit acute ([facility])    CM reviewed chart. [facility] accepted pt and is waiting to get auth from [facility]. Fernanda rounded on patient and provided auth. CM spoek with pt and pt's granddaughter to confirm that they are in agreement with discharge plan. Family will transport patient around 12:30pm.  Envelop containing MARs, Kardex, AVS, emtalta, and prescription will be sent with pt. Nurse will call report.     Jeb Avina BSW, ACM

## 2017-07-11 NOTE — PROGRESS NOTES
Bedside shift change report given to Jamari Brown RN (oncoming nurse) by Eddie Durham RN (offgoing nurse). Report included the following information SBAR, Kardex, Intake/Output, MAR and Recent Results.

## 2017-08-02 NOTE — DISCHARGE SUMMARY
[address]   [address], [address]    [facility]       Name:  Tristin Rodriguez   MR#:  [id number]   :  10/20/1932   Account #:  [de-identified]        Date of Adm:  2017       DISCHARGE LOCATION: To the  inpatient rehab. NEW DIAGNOSES:   1. Acute cerebrovascular accident, most consistent with a   cardioembolic source. 2. Cerebral amyloid angiopathy. 3. Atrial flutter, rate controlled. 4. Systolic heart failure, compensated. 5. Acute renal failure with chronic kidney disease stage III. 6. Severe protein calorie malnutrition. 7. Anemia. CHRONIC DIAGNOSES:   1. Chronic lymphocytic leukemia. 2. Hypertension. 3. History of Takotsubo cardiomyopathy in  and . 4. Hyperlipidemia. 5. Allergic rhinitis. DISCHARGE MEDICATIONS: Include new medications:   1. Aspirin 325 mg 1 tablet p.o. daily, changed from 81 mg.   2. Colace 100 mg twice a day. CHANGED MEDICATIONS: Lipitor 40 mg p.o. daily. UNCHANGED MEDICATIONS: Toprol-XL 50 mg 1 tablet p.o. daily. STOPPED MEDICATIONS:   1. Lasix. 2. Potassium. DISCHARGE LOCATION: To the inpatient rehabilitation. ACTIVITY: As tolerated. DIET: Cardiac diet. FOLLOWUP: We will continue to follow with the patient at the inpatient   rehabilitation. Once patient discharged from the inpatient rehabilitation,   follow up with the PCP. PHYSICAL EXAMINATION ON DISCHARGE   GENERAL: The patient was awake, oriented x3 on the discharge. VITAL SIGNS: Blood pressure is 133/78, pulse was 80, respiratory rate   is 16, saturation 95% on room air. HEAD AND NECK: Pupils equal. Extraocular muscles intact. NECK: No JVD, no lymphadenopathy, no thyromegaly. LUNG AUSCULTATION: Clear bilaterally. No wheezing, no crackles. HEART AUSCULTATION: Rate and rhythm regular, no gallop, no rub. ABDOMEN: Soft, nontender, nondistended, bowel sounds present.    EXTREMITIES: Leg examination, not any swelling. NEUROLOGICAL: Cranial nerves 2 through 12 are grossly intact. Right upper extremity power is 3/5, right lower extremity power is 2/5 to   3/5. HOSPITAL COURSE:     1. Acute cerebrovascular accident with left-sided weakness, right lower   extremity weakness more than the left, it is related with left MCA   stroke. The patient's MRI was obtained, which shows extensive chronic   ischemic changes. Neurologic consultation was obtained. The patient   does have a chronic history of CLL. The patient's MRI also shows   numerous small foci of the microhemorrhage which indicated amyloid   angiopathy, so patient is not a good candidate for long-term   anticoagulation. However, the patient was on aspirin 81 mg p.o. daily. Discussed with the neurologist, changed it to the aspirin 325 mg p.o.   daily, also increase the Lipitor to 40 mg p.o. daily. The patient's blood   pressure tends to run higher during the hospitalization, started on   Toprol-XL. 2. Atrial flutter. During the hospitalization, the patient noted to have   atrial flutter. Cardiology consult was obtained, started her on the   metoprolol. With the metoprolol, blood pressure remained well   controlled. Echocardiogram was obtained too. Ejection fraction noted   to have 40% to 45% with severe hypokinesis with moderate concentric   hypertrophy. 3. Chronic systolic heart failure. The patient noted to have a Takotsubo   cardiomyopathy in 2013 and 2014, repeat echo shows ejection fraction   40% to 45%. At this time, the patient looks well compensated;   however, was high risk of the dehydration. Discontinue Lasix, continue   to monitor. Continue beta blocker. THE PATIENT IS ALLERGIC TO   ACE INHIBITORS so no ACE inhibitors and ARBs, so only is   continued on Toprol-XL. 4. Protein calorie malnutrition. Recommend high protein diet. 5. Hypertension, controlled with the Toprol XL. 6. Hyperlipidemia. Continue statin.    7. Chronic lymphocytic leukemia. Continue to monitor. The patient   does follow up with the oncologist, Dr. Shirin Christian. 8. Acute renal failure. During hospitalization, the patient's creatinine   was high, After the resuscitation it came down to 1.5. The patient does   have a chronic kidney disease. 9. Chronic kidney disease with hypertension with chronic systolic heart   failure. Treatment as above.          MD JELENA Phelps / Price Monday   D:  08/02/2017   13:55   T:  08/02/2017   14:29   Job #:  [id number]

## 2017-10-13 ENCOUNTER — APPOINTMENT (OUTPATIENT)
Dept: GENERAL RADIOLOGY | Age: 82
End: 2017-10-13
Attending: EMERGENCY MEDICINE
Payer: MEDICARE

## 2017-10-13 ENCOUNTER — HOSPITAL ENCOUNTER (EMERGENCY)
Age: 82
Discharge: HOME | End: 2017-10-13
Attending: EMERGENCY MEDICINE
Payer: MEDICARE

## 2017-10-13 VITALS
SYSTOLIC BLOOD PRESSURE: 164 MMHG | HEIGHT: 65 IN | BODY MASS INDEX: 18.83 KG/M2 | DIASTOLIC BLOOD PRESSURE: 101 MMHG | TEMPERATURE: 97.7 F | WEIGHT: 113 LBS | RESPIRATION RATE: 22 BRPM | OXYGEN SATURATION: 98 % | HEART RATE: 85 BPM

## 2017-10-13 DIAGNOSIS — C91.10 CLL (CHRONIC LYMPHOCYTIC LEUKEMIA) (HCC): ICD-10-CM

## 2017-10-13 DIAGNOSIS — N28.9 RENAL INSUFFICIENCY: ICD-10-CM

## 2017-10-13 DIAGNOSIS — I50.9 CONGESTIVE HEART FAILURE, UNSPECIFIED CONGESTIVE HEART FAILURE CHRONICITY, UNSPECIFIED CONGESTIVE HEART FAILURE TYPE: Primary | ICD-10-CM

## 2017-10-13 LAB
ALBUMIN SERPL-MCNC: 3.7 G/DL (ref 3.5–5)
ALBUMIN/GLOB SERPL: 1.2 {RATIO} (ref 1.1–2.2)
ALP SERPL-CCNC: 82 U/L (ref 45–117)
ALT SERPL-CCNC: 52 U/L (ref 12–78)
ANION GAP SERPL CALC-SCNC: 8 MMOL/L (ref 5–15)
APTT PPP: 24.4 SEC (ref 22.1–32.5)
AST SERPL-CCNC: 38 U/L (ref 15–37)
BASOPHILS # BLD: 0 K/UL (ref 0–0.1)
BASOPHILS NFR BLD: 0 % (ref 0–1)
BILIRUB SERPL-MCNC: 0.9 MG/DL (ref 0.2–1)
BNP SERPL-MCNC: 4033 PG/ML (ref 0–450)
BUN SERPL-MCNC: 19 MG/DL (ref 6–20)
BUN/CREAT SERPL: 15 (ref 12–20)
CALCIUM SERPL-MCNC: 9.2 MG/DL (ref 8.5–10.1)
CHLORIDE SERPL-SCNC: 110 MMOL/L (ref 97–108)
CK MB CFR SERPL CALC: 1.6 % (ref 0–2.5)
CK MB SERPL-MCNC: 1.3 NG/ML (ref 5–25)
CK SERPL-CCNC: 80 U/L (ref 26–192)
CO2 SERPL-SCNC: 26 MMOL/L (ref 21–32)
CREAT SERPL-MCNC: 1.27 MG/DL (ref 0.55–1.02)
DIFFERENTIAL METHOD BLD: ABNORMAL
EOSINOPHIL # BLD: 0.6 K/UL (ref 0–0.4)
EOSINOPHIL NFR BLD: 1 % (ref 0–7)
ERYTHROCYTE [DISTWIDTH] IN BLOOD BY AUTOMATED COUNT: 17.1 % (ref 11.5–14.5)
GLOBULIN SER CALC-MCNC: 3.1 G/DL (ref 2–4)
GLUCOSE SERPL-MCNC: 104 MG/DL (ref 65–100)
HCT VFR BLD AUTO: 35.3 % (ref 35–47)
HGB BLD-MCNC: 11 G/DL (ref 11.5–16)
INR PPP: 1.1 (ref 0.9–1.1)
LYMPHOCYTES # BLD: 59.4 K/UL (ref 0.8–3.5)
LYMPHOCYTES NFR BLD: 95 % (ref 12–49)
MCH RBC QN AUTO: 29.6 PG (ref 26–34)
MCHC RBC AUTO-ENTMCNC: 31.2 G/DL (ref 30–36.5)
MCV RBC AUTO: 95.1 FL (ref 80–99)
MONOCYTES # BLD: 0 K/UL (ref 0–1)
MONOCYTES NFR BLD: 0 % (ref 5–13)
NEUTS SEG # BLD: 2.5 K/UL (ref 1.8–8)
NEUTS SEG NFR BLD: 4 % (ref 32–75)
PATH REV BLD -IMP: ABNORMAL
PLATELET # BLD AUTO: 189 K/UL (ref 150–400)
POTASSIUM SERPL-SCNC: 4.1 MMOL/L (ref 3.5–5.1)
PROT SERPL-MCNC: 6.8 G/DL (ref 6.4–8.2)
PROTHROMBIN TIME: 11.4 SEC (ref 9–11.1)
RBC # BLD AUTO: 3.71 M/UL (ref 3.8–5.2)
RBC MORPH BLD: ABNORMAL
RBC MORPH BLD: ABNORMAL
SODIUM SERPL-SCNC: 144 MMOL/L (ref 136–145)
THERAPEUTIC RANGE,PTTT: NORMAL SECS (ref 58–77)
TROPONIN I SERPL-MCNC: <0.04 NG/ML
WBC # BLD AUTO: 62.5 K/UL (ref 3.6–11)
WBC MORPH BLD: ABNORMAL

## 2017-10-13 PROCEDURE — 82550 ASSAY OF CK (CPK): CPT | Performed by: EMERGENCY MEDICINE

## 2017-10-13 PROCEDURE — 85610 PROTHROMBIN TIME: CPT | Performed by: EMERGENCY MEDICINE

## 2017-10-13 PROCEDURE — 36415 COLL VENOUS BLD VENIPUNCTURE: CPT | Performed by: EMERGENCY MEDICINE

## 2017-10-13 PROCEDURE — 83880 ASSAY OF NATRIURETIC PEPTIDE: CPT | Performed by: EMERGENCY MEDICINE

## 2017-10-13 PROCEDURE — 85730 THROMBOPLASTIN TIME PARTIAL: CPT | Performed by: EMERGENCY MEDICINE

## 2017-10-13 PROCEDURE — 74011250637 HC RX REV CODE- 250/637: Performed by: EMERGENCY MEDICINE

## 2017-10-13 PROCEDURE — 74011250636 HC RX REV CODE- 250/636: Performed by: EMERGENCY MEDICINE

## 2017-10-13 PROCEDURE — 93005 ELECTROCARDIOGRAM TRACING: CPT

## 2017-10-13 PROCEDURE — 85025 COMPLETE CBC W/AUTO DIFF WBC: CPT | Performed by: EMERGENCY MEDICINE

## 2017-10-13 PROCEDURE — 96375 TX/PRO/DX INJ NEW DRUG ADDON: CPT

## 2017-10-13 PROCEDURE — 80053 COMPREHEN METABOLIC PANEL: CPT | Performed by: EMERGENCY MEDICINE

## 2017-10-13 PROCEDURE — 99285 EMERGENCY DEPT VISIT HI MDM: CPT

## 2017-10-13 PROCEDURE — 84484 ASSAY OF TROPONIN QUANT: CPT | Performed by: EMERGENCY MEDICINE

## 2017-10-13 PROCEDURE — 71010 XR CHEST PORT: CPT

## 2017-10-13 PROCEDURE — 96374 THER/PROPH/DIAG INJ IV PUSH: CPT

## 2017-10-13 RX ORDER — FUROSEMIDE 10 MG/ML
40 INJECTION INTRAMUSCULAR; INTRAVENOUS
Status: COMPLETED | OUTPATIENT
Start: 2017-10-13 | End: 2017-10-13

## 2017-10-13 RX ORDER — GUAIFENESIN 100 MG/5ML
325 LIQUID (ML) ORAL
Status: COMPLETED | OUTPATIENT
Start: 2017-10-13 | End: 2017-10-13

## 2017-10-13 RX ORDER — FUROSEMIDE 20 MG/1
20 TABLET ORAL DAILY
Qty: 10 TAB | Refills: 0 | Status: SHIPPED | OUTPATIENT
Start: 2017-10-13 | End: 2019-01-16 | Stop reason: DRUGHIGH

## 2017-10-13 RX ORDER — POTASSIUM CHLORIDE 750 MG/1
10 CAPSULE, EXTENDED RELEASE ORAL DAILY
Qty: 10 CAP | Refills: 0 | Status: SHIPPED | OUTPATIENT
Start: 2017-10-13 | End: 2019-01-16 | Stop reason: SDUPTHER

## 2017-10-13 RX ORDER — METOPROLOL SUCCINATE 50 MG/1
100 TABLET, EXTENDED RELEASE ORAL DAILY
Status: DISCONTINUED | OUTPATIENT
Start: 2017-10-13 | End: 2017-10-13 | Stop reason: HOSPADM

## 2017-10-13 RX ADMIN — METOPROLOL SUCCINATE 100 MG: 50 TABLET, EXTENDED RELEASE ORAL at 09:22

## 2017-10-13 RX ADMIN — FUROSEMIDE 40 MG: 10 INJECTION, SOLUTION INTRAMUSCULAR; INTRAVENOUS at 09:56

## 2017-10-13 RX ADMIN — ASPIRIN 81 MG 324 MG: 81 TABLET ORAL at 09:23

## 2017-10-13 NOTE — ED NOTES
Pt on monitor x 3 and daughter at bedside. Dr. Wade Tijerina completed assessment at bedside followed by [facility] at bedside.

## 2017-10-13 NOTE — ED NOTES
Pt continues on monitor x 3 in atrial fib with rate of 86. Medicated with Metoprolol 100 and  mg. Daughter at bedside. Dr. Violet Salinas, completed assessment and plan to D/C home on Lasix.

## 2017-10-13 NOTE — ED PROVIDER NOTES
HPI Comments: 80 y.o. female with past medical history significant for A-Fib, CKD, HTN who presents from home via private vehicle with chief complaint of SOB. Pt's grand daughter reports that the patient has been progressively more SOB for about the past week which prompted her to make an appointment with the patient's cardiologist 2 days ago. She was seen by her cardiologist who expressed concerns that she had not been placed back on her Lasix since it was discontinued in June 2017 when she was found to have CKD while admitted for a CVA. He advised that he was going to check with her PCP on the status of her kidney function prior to making changing her medication regimen. However, over the past 2 days she has had continued SOB that is exacerbated by exertion and prompted the visit to the ED this morning. She also reports that this morning she has associated heaviness across her chest and a cough productive of sputum with her SOB. Pt denies palpitations, diaphoresis, dizziness, nausea, vomiting, diarrhea, abdominal pain, back pain. There are no other acute medical concerns at this time. PCP: Darylene Duval, MD  Note written by sloane Hopson, as dictated by Vivi Hopper MD 8:45 AM        The history is provided by the patient and a relative. Past Medical History:   Diagnosis Date    A-fib [facility]) 10/31/2014    Allergic rhinitis 1/13/2010    Chronic kidney disease     CLL (chronic lymphocytic leukemia) (HCC)     HTN (hypertension) 1/13/2010    Hypercholesterolemia        History reviewed. No pertinent surgical history. History reviewed. No pertinent family history. Social History     Social History    Marital status:      Spouse name: N/A    Number of children: N/A    Years of education: N/A     Occupational History    Not on file.      Social History Main Topics    Smoking status: Former Smoker     Packs/day: 0.25     Years: 50.00     Types: Cigarettes     Quit date: 4/1/2010    Smokeless tobacco: Not on file    Alcohol use 1.0 - 2.0 oz/week     2 - 4 Standard drinks or equivalent per week      Comment: recently quit Friday gin    Drug use: No    Sexual activity: Not on file     Other Topics Concern    Not on file     Social History Narrative         ALLERGIES: Ace inhibitors; Amlodipine; Atenolol; Chlorthalidone; Felodipine; and Hyzaar [losartan-hydrochlorothiazide]    Review of Systems   Constitutional: Negative for fever. HENT: Negative for facial swelling. Eyes: Negative for visual disturbance. Respiratory: Positive for cough and shortness of breath. Negative for chest tightness. Cardiovascular: Positive for chest pain. Gastrointestinal: Negative for abdominal pain, diarrhea, nausea and vomiting. Genitourinary: Negative for dysuria. Musculoskeletal: Negative for arthralgias. Skin: Negative for rash. Neurological: Negative for dizziness. Hematological: Negative for adenopathy. Psychiatric/Behavioral: Negative for suicidal ideas. All other systems reviewed and are negative. Vitals:    10/13/17 0830   BP: (!) 179/96   Pulse: 90   Resp: 16   Temp: 97.9 °F (36.6 °C)   SpO2: 96%   Height: 5' 5\" (1.651 m)            Physical Exam   Constitutional: She is oriented to person, place, and time. She appears well-developed and well-nourished. No distress. HENT:   Head: Normocephalic and atraumatic. Mouth/Throat: Oropharynx is clear and moist.   Eyes: Pupils are equal, round, and reactive to light. No scleral icterus. Neck: Normal range of motion. Neck supple. No thyromegaly present. Cardiovascular: Normal heart sounds and intact distal pulses. No murmur heard. Tachycardic, irregularly irregular pulse. Pulmonary/Chest: Effort normal and breath sounds normal. No respiratory distress. She has no rales. No crackles   Abdominal: Soft. Bowel sounds are normal. She exhibits no distension. There is no tenderness.    Musculoskeletal: Normal range of motion. She exhibits no edema. No edema. Neurological: She is alert and oriented to person, place, and time. Skin: Skin is warm and dry. No rash noted. She is not diaphoretic. Nursing note and vitals reviewed. Note written by sloane Quintana, as dictated by Lvaon Cordoba MD 8:56 AM       [facility]  ED Course       Procedures    ED EKG interpretation:  Rhythm:  A. fib. Rate (approx.): 98. Axis: normal.  ST segment:  No concerning ST elevations or depressions. This EKG was interpreted by Lavon Cordoba MD,ED Provider. Portable Chest Xray:  IMPRESSION: There is slight blunting of the CP angles consistent with small  effusions with slight changes of interstitial edema     labs unremarkable except for slightly elevated Pro BNP  WBC and hgb at baseline. Cr stable. 9:39 AM  VS remain stable. Pt discussed with Dr. Luis Miguel Cornell from [place]. He agrees with plan to discharge back on lasix and potassium. Pt to f/u with PCP in one week for repeat labs and reassessment. Stable for discharge.

## 2017-10-13 NOTE — DISCHARGE INSTRUCTIONS
Heart Failure: Care Instructions  Your Care Instructions    Heart failure occurs when your heart does not pump as much blood as the body needs. Failure does not mean that the heart has stopped pumping but rather that it is not pumping as well as it should. Over time, this causes fluid buildup in your lungs and other parts of your body. Fluid buildup can cause shortness of breath, fatigue, swollen ankles, and other problems. By taking medicines regularly, reducing sodium (salt) in your diet, checking your weight every day, and making lifestyle changes, you can feel better and live longer. Follow-up care is a key part of your treatment and safety. Be sure to make and go to all appointments, and call your doctor if you are having problems. It's also a good idea to know your test results and keep a list of the medicines you take. How can you care for yourself at home? Medicines  · Be safe with medicines. Take your medicines exactly as prescribed. Call your doctor if you think you are having a problem with your medicine. · Do not take any vitamins, over-the-counter medicine, or herbal products without talking to your doctor first. Claretha Em not take ibuprofen (Advil or Motrin) and naproxen (Aleve) without talking to your doctor first. They could make your heart failure worse. · You may be taking some of the following medicine. ¨ Beta-blockers can slow heart rate, decrease blood pressure, and improve your condition. Taking a beta-blocker may lower your chance of needing to be hospitalized. ¨ Angiotensin-converting enzyme inhibitors (ACEIs) reduce the heart's workload, lower blood pressure, and reduce swelling. Taking an ACEI may lower your chance of needing to be hospitalized again. ¨ Angiotensin II receptor blockers (ARBs) work like ACEIs. Your doctor may prescribe them instead of ACEIs. ¨ Diuretics, also called water pills, reduce swelling.   ¨ Potassium supplements replace this important mineral, which is sometimes lost with diuretics. ¨ Aspirin and other blood thinners prevent blood clots, which can cause a stroke or heart attack. You will get more details on the specific medicines your doctor prescribes. Diet  · Your doctor may suggest that you limit sodium to 2,000 milligrams (mg) a day or less. That is less than 1 teaspoon of salt a day, including all the salt you eat in cooking or in packaged foods. People get most of their sodium from processed foods. Fast food and restaurant meals also tend to be very high in sodium. · Ask your doctor how much liquid you can drink each day. You may have to limit liquids. Weight  · Weigh yourself without clothing at the same time each day. Record your weight. Call your doctor if you have a sudden weight gain, such as more than 2 to 3 pounds in a day or 5 pounds in a week. (Your doctor may suggest a different range of weight gain.) A sudden weight gain may mean that your heart failure is getting worse. Activity level  · Start light exercise (if your doctor says it is okay). Even if you can only do a small amount, exercise will help you get stronger, have more energy, and manage your weight and your stress. Walking is an easy way to get exercise. Start out by walking a little more than you did before. Bit by bit, increase the amount you walk. · When you exercise, watch for signs that your heart is working too hard. You are pushing yourself too hard if you cannot talk while you are exercising. If you become short of breath or dizzy or have chest pain, stop, sit down, and rest.  · If you feel \"wiped out\" the day after you exercise, walk slower or for a shorter distance until you can work up to a better pace. · Get enough rest at night. Sleeping with 1 or 2 pillows under your upper body and head may help you breathe easier. Lifestyle changes  · Do not smoke. Smoking can make a heart condition worse.  If you need help quitting, talk to your doctor about stop-smoking programs and medicines. These can increase your chances of quitting for good. Quitting smoking may be the most important step you can take to protect your heart. · Limit alcohol to 2 drinks a day for men and 1 drink a day for women. Too much alcohol can cause health problems. · Avoid getting sick from colds and the flu. Get a pneumococcal vaccine shot. If you have had one before, ask your doctor whether you need another dose. Get a flu shot each year. If you must be around people with colds or the flu, wash your hands often. When should you call for help? Call 911 if you have symptoms of sudden heart failure such as:  · You have severe trouble breathing. · You cough up pink, foamy mucus. · You have a new irregular or rapid heartbeat. Call your doctor now or seek immediate medical care if:  · You have new or increased shortness of breath. · You are dizzy or lightheaded, or you feel like you may faint. · You have sudden weight gain, such as more than 2 to 3 pounds in a day or 5 pounds in a week. (Your doctor may suggest a different range of weight gain.)  · You have increased swelling in your legs, ankles, or feet. · You are suddenly so tired or weak that you cannot do your usual activities. Watch closely for changes in your health, and be sure to contact your doctor if:  · You develop new symptoms. Where can you learn more? Go to http://eugene-rohit.info/. Enter D497 in the search box to learn more about \"Heart Failure: Care Instructions. \"  Current as of: April 3, 2017  Content Version: 11.3  © 6777-6836 [employer]. Care instructions adapted under license by [employer] (which disclaims liability or warranty for this information). If you have questions about a medical condition or this instruction, always ask your healthcare professional. [address] any warranty or liability for your use of this information.            We hope that we have addressed all of your medical concerns. The examination and treatment you received in the Emergency Department were for an emergent problem and were not intended as complete care. It is important that you follow up with your healthcare provider(s) for ongoing care. If your symptoms worsen or do not improve as expected, and you are unable to reach your usual health care provider(s), you should return to the Emergency Department. Today's healthcare is undergoing tremendous change, and patient satisfaction surveys are one of the many tools to assess the quality of medical care. You may receive a survey from the [employer] regarding your experience in the Emergency Department. I hope that your experience has been completely positive, particularly the medical care that I provided. As such, please participate in the survey; anything less than excellent does not meet my expectations or intentions. [facility] and 8 [facility] participate in nationally recognized quality of care measures. If your blood pressure is greater than 120/80, as reported below, we urge that you seek medical care to address the potential of high blood pressure, commonly known as hypertension. Hypertension can be hereditary or can be caused by certain medical conditions, pain, stress, or \"white coat syndrome. \"       Please make an appointment with your health care provider(s) for follow up of your Emergency Department visit. VITALS:   Patient Vitals for the past 8 hrs:   Temp Pulse Resp BP SpO2   10/13/17 0929 98.1 °F (36.7 °C) 84 21 (!) 161/114 96 %   10/13/17 0900 - 98 23 (!) 166/113 96 %   10/13/17 0847 - (!) 104 24 - 96 %   10/13/17 0836 - 98 18 (!) 179/96 96 %   10/13/17 0830 97.9 °F (36.6 °C) 90 16 (!) 179/96 96 %          Thank you for allowing us to provide you with medical care today. We realize that you have many choices for your emergency care needs.   Please choose us in the future for any continued health care needs. Ruy Carmona MD    [facility], [facility].   Office: [phone]            Recent Results (from the past 24 hour(s))   EKG, 12 LEAD, INITIAL    Collection Time: 10/13/17  8:32 AM   Result Value Ref Range    Ventricular Rate 98 BPM    Atrial Rate 159 BPM    QRS Duration 88 ms    Q-T Interval 378 ms    QTC Calculation (Bezet) 482 ms    Calculated R Axis 57 degrees    Calculated T Axis 9 degrees    Diagnosis       Atrial fibrillation  When compared with ECG of 30-JUN-2017 07:30,  Atrial fibrillation has replaced Atrial flutter  ST no longer depressed in Inferior leads  Nonspecific T wave abnormality has replaced inverted T waves in Inferior   leads     CBC WITH AUTOMATED DIFF    Collection Time: 10/13/17  8:34 AM   Result Value Ref Range    WBC 62.5 (HH) 3.6 - 11.0 K/uL    RBC 3.71 (L) 3.80 - 5.20 M/uL    HGB 11.0 (L) 11.5 - 16.0 g/dL    HCT 35.3 35.0 - 47.0 %    MCV 95.1 80.0 - 99.0 FL    MCH 29.6 26.0 - 34.0 PG    MCHC 31.2 30.0 - 36.5 g/dL    RDW 17.1 (H) 11.5 - 14.5 %    PLATELET 237 401 - 354 K/uL    NEUTROPHILS 4 (L) 32 - 75 %    LYMPHOCYTES 95 (H) 12 - 49 %    MONOCYTES 0 (L) 5 - 13 %    EOSINOPHILS 1 0 - 7 %    BASOPHILS 0 0 - 1 %    ABS. NEUTROPHILS 2.5 1.8 - 8.0 K/UL    ABS. LYMPHOCYTES 59.4 (H) 0.8 - 3.5 K/UL    ABS. MONOCYTES 0.0 0.0 - 1.0 K/UL    ABS. EOSINOPHILS 0.6 (H) 0.0 - 0.4 K/UL    ABS.  BASOPHILS 0.0 0.0 - 0.1 K/UL    DF MANUAL      RBC COMMENTS ANISOCYTOSIS  1+        RBC COMMENTS KATHIE CELLS  PRESENT        WBC COMMENTS SMUDGE CELLS SEEN     METABOLIC PANEL, COMPREHENSIVE    Collection Time: 10/13/17  8:34 AM   Result Value Ref Range    Sodium 144 136 - 145 mmol/L    Potassium 4.1 3.5 - 5.1 mmol/L    Chloride 110 (H) 97 - 108 mmol/L    CO2 26 21 - 32 mmol/L    Anion gap 8 5 - 15 mmol/L    Glucose 104 (H) 65 - 100 mg/dL    BUN 19 6 - 20 MG/DL    Creatinine 1.27 (H) 0.55 - 1.02 MG/DL    BUN/Creatinine ratio 15 12 - 20      GFR est AA 49 (L) >60 ml/min/1.73m2    GFR est non-AA 40 (L) >60 ml/min/1.73m2    Calcium 9.2 8.5 - 10.1 MG/DL    Bilirubin, total 0.9 0.2 - 1.0 MG/DL    ALT (SGPT) 52 12 - 78 U/L    AST (SGOT) 38 (H) 15 - 37 U/L    Alk. phosphatase 82 45 - 117 U/L    Protein, total 6.8 6.4 - 8.2 g/dL    Albumin 3.7 3.5 - 5.0 g/dL    Globulin 3.1 2.0 - 4.0 g/dL    A-G Ratio 1.2 1.1 - 2.2     TROPONIN I    Collection Time: 10/13/17  8:34 AM   Result Value Ref Range    Troponin-I, Qt. <0.04 <0.05 ng/mL   CK W/ CKMB & INDEX    Collection Time: 10/13/17  8:34 AM   Result Value Ref Range    CK 80 26 - 192 U/L    CK - MB 1.3 <3.6 NG/ML    CK-MB Index 1.6 0 - 2.5     NT-PRO BNP    Collection Time: 10/13/17  8:34 AM   Result Value Ref Range    NT pro-BNP 4033 (H) 0 - 450 PG/ML   PROTHROMBIN TIME + INR    Collection Time: 10/13/17  8:34 AM   Result Value Ref Range    INR 1.1 0.9 - 1.1      Prothrombin time 11.4 (H) 9.0 - 11.1 sec   PTT    Collection Time: 10/13/17  8:34 AM   Result Value Ref Range    aPTT 24.4 22.1 - 32.5 sec    aPTT, therapeutic range     58.0 - 77.0 SECS       Xr Chest Port    Result Date: 10/13/2017  EXAM:  XR CHEST PORT INDICATION:  sob COMPARISON:  6/30/2017 FINDINGS: A portable AP radiograph of the chest was obtained at 838 hours. The patient is on a cardiac monitor. There is minimal blunting of the CP angles right greater than left. There is cephalization of flow with slight interstitial prominence. .. Cardiomegaly is stable. There is prominence of the hilum most likely vascular. Aorta is mildly ectatic. Mily Barrio Bony structures are unchanged.      IMPRESSION: There is slight blunting of the CP angles consistent with small effusions with slight changes of interstitial edema

## 2017-10-14 LAB
ATRIAL RATE: 159 BPM
CALCULATED R AXIS, ECG10: 57 DEGREES
CALCULATED T AXIS, ECG11: 9 DEGREES
DIAGNOSIS, 93000: NORMAL
Q-T INTERVAL, ECG07: 378 MS
QRS DURATION, ECG06: 88 MS
QTC CALCULATION (BEZET), ECG08: 482 MS
VENTRICULAR RATE, ECG03: 98 BPM

## 2017-10-26 NOTE — CALL BACK NOTE
[facility] PSYCHIATRIC [facility] Services Emergency Department Follow Up Call Record    Discharged to : Home/Family Home/Home Health/Skilled Facility/Rehab/Assisted Living/Other_home______  1) Did you receive your discharge instructions? Yes  Granddaughter has reviewed discharge instructions. 2) Do you understand them? Yes         3) Are you able to follow them? Yes          If NO, what can I clarify for you? 4) Do you understand your diagnosis? Yes         5) Do you know which symptoms should prompt you to call the doctor? Yes     6) Were you able to fill and  any medications that were prescribed? Yes     7) You were prescribed __Lasix_________for ___daily_________________. Common side effects of this medication are___rash, hypotention_________________. This is not a complete list so please review the forms given from the pharmacy for a complete list.      8) Are there any questions about your medications? No            Have you scheduled any recommended doctors appointments (specialty, PCP) Granddaughter reports awaiting follow up call from [facility] PCP for appointment. If NO, what barriers are you encountering (transportation/lost contact info/cost/  didnt think necessary/no PCP  9) If discharged with Home Health, has the agency contacted you to schedule visit? N/A  10) Is there anyone available to help you at home (meals, errands, transportation    monitoring) (adult children, neighbors, private duty companions) Yes Granddaughter, [facility]. 11) Are you on a special diet? Yes Low Na        If YES, do you understand the requirements for this diet? Yes      Education provided? 12) If presented with cough, bronchitis, COPD, asthma, is it ok to ask that the   respiratory disease management educator call you? Not applicable      13)  A) If presented with fall, were you issued an assistive device in the ED    Are you using? Not applicable  B) If given RX for device, have you obtained? Not applicable       If NO, barriers? C) Therapist recommended:NO   Are you able to implement the suggestions? Not applicable        If NO, barriers to implementation? D) Are you having any difficulties with mobility inside your home?     (steps, bed, tub)No   If YES, ask if the [facility] PT can contact patient and good time and number?  14)  At the end of your discharge instructions, there is information about accessing [facility], have you had a chance to review those? Yes         Do you have any questions about signing up for this service? We encourage our patients to be active participants in their healthcare and this site is one of the ways to do that. It will allow you to access parts of your medical record, email your doctors office, schedule appointments, and request medications refills . 15) Are there any other questions that I can answer for you regarding    your Emergency department visit?  NO             Estimated Call Time:_____3:57 PM  _____________ Date/Time:_______________

## 2017-12-23 ENCOUNTER — APPOINTMENT (OUTPATIENT)
Dept: CT IMAGING | Age: 82
End: 2017-12-23
Attending: EMERGENCY MEDICINE
Payer: MEDICARE

## 2017-12-23 ENCOUNTER — HOSPITAL ENCOUNTER (EMERGENCY)
Age: 82
Discharge: HOME | End: 2017-12-23
Attending: EMERGENCY MEDICINE
Payer: MEDICARE

## 2017-12-23 VITALS
RESPIRATION RATE: 13 BRPM | DIASTOLIC BLOOD PRESSURE: 81 MMHG | HEIGHT: 65 IN | TEMPERATURE: 98.3 F | WEIGHT: 113 LBS | SYSTOLIC BLOOD PRESSURE: 180 MMHG | BODY MASS INDEX: 18.83 KG/M2 | OXYGEN SATURATION: 100 % | HEART RATE: 44 BPM

## 2017-12-23 DIAGNOSIS — R29.898 RIGHT LEG WEAKNESS: Primary | ICD-10-CM

## 2017-12-23 LAB
ALBUMIN SERPL-MCNC: 3.5 G/DL (ref 3.5–5)
ALBUMIN/GLOB SERPL: 1 {RATIO} (ref 1.1–2.2)
ALP SERPL-CCNC: 59 U/L (ref 45–117)
ALT SERPL-CCNC: 54 U/L (ref 12–78)
ANION GAP SERPL CALC-SCNC: 7 MMOL/L (ref 5–15)
APPEARANCE UR: ABNORMAL
AST SERPL-CCNC: 38 U/L (ref 15–37)
BASOPHILS # BLD: 0 K/UL (ref 0–0.1)
BASOPHILS NFR BLD: 0 % (ref 0–1)
BILIRUB SERPL-MCNC: 0.3 MG/DL (ref 0.2–1)
BILIRUB UR QL: NEGATIVE
BUN SERPL-MCNC: 18 MG/DL (ref 6–20)
BUN/CREAT SERPL: 11 (ref 12–20)
CALCIUM SERPL-MCNC: 9.1 MG/DL (ref 8.5–10.1)
CHLORIDE SERPL-SCNC: 107 MMOL/L (ref 97–108)
CO2 SERPL-SCNC: 29 MMOL/L (ref 21–32)
COLOR UR: ABNORMAL
CREAT SERPL-MCNC: 1.59 MG/DL (ref 0.55–1.02)
DIFFERENTIAL METHOD BLD: ABNORMAL
EOSINOPHIL # BLD: 0.7 K/UL (ref 0–0.4)
EOSINOPHIL NFR BLD: 1 % (ref 0–7)
ERYTHROCYTE [DISTWIDTH] IN BLOOD BY AUTOMATED COUNT: 16 % (ref 11.5–14.5)
GLOBULIN SER CALC-MCNC: 3.5 G/DL (ref 2–4)
GLUCOSE SERPL-MCNC: 80 MG/DL (ref 65–100)
GLUCOSE UR STRIP.AUTO-MCNC: NEGATIVE MG/DL
HCT VFR BLD AUTO: 36.2 % (ref 35–47)
HGB BLD-MCNC: 11.3 G/DL (ref 11.5–16)
HGB UR QL STRIP: NEGATIVE
KETONES UR QL STRIP.AUTO: NEGATIVE MG/DL
LEUKOCYTE ESTERASE UR QL STRIP.AUTO: NEGATIVE
LYMPHOCYTES # BLD: 67 K/UL (ref 0.8–3.5)
LYMPHOCYTES NFR BLD: 96 % (ref 12–49)
MCH RBC QN AUTO: 29.7 PG (ref 26–34)
MCHC RBC AUTO-ENTMCNC: 31.2 G/DL (ref 30–36.5)
MCV RBC AUTO: 95.3 FL (ref 80–99)
MONOCYTES # BLD: 0 K/UL (ref 0–1)
MONOCYTES NFR BLD: 0 % (ref 5–13)
NEUTS SEG # BLD: 2.1 K/UL (ref 1.8–8)
NEUTS SEG NFR BLD: 3 % (ref 32–75)
NITRITE UR QL STRIP.AUTO: NEGATIVE
NRBC # BLD: 0 K/UL (ref 0–0.01)
NRBC BLD-RTO: 0 PER 100 WBC
PATH REV BLD -IMP: ABNORMAL
PH UR STRIP: 7 [PH] (ref 5–8)
PLATELET # BLD AUTO: 152 K/UL (ref 150–400)
POTASSIUM SERPL-SCNC: 3.7 MMOL/L (ref 3.5–5.1)
PROT SERPL-MCNC: 7 G/DL (ref 6.4–8.2)
PROT UR STRIP-MCNC: NEGATIVE MG/DL
RBC # BLD AUTO: 3.8 M/UL (ref 3.8–5.2)
RBC MORPH BLD: ABNORMAL
SODIUM SERPL-SCNC: 143 MMOL/L (ref 136–145)
SP GR UR REFRACTOMETRY: 1.01 (ref 1–1.03)
UR CULT HOLD, URHOLD: NORMAL
UROBILINOGEN UR QL STRIP.AUTO: 0.2 EU/DL (ref 0.2–1)
WBC # BLD AUTO: 69.8 K/UL (ref 3.6–11)
WBC MORPH BLD: ABNORMAL

## 2017-12-23 PROCEDURE — 80053 COMPREHEN METABOLIC PANEL: CPT | Performed by: EMERGENCY MEDICINE

## 2017-12-23 PROCEDURE — 85025 COMPLETE CBC W/AUTO DIFF WBC: CPT | Performed by: EMERGENCY MEDICINE

## 2017-12-23 PROCEDURE — 99285 EMERGENCY DEPT VISIT HI MDM: CPT

## 2017-12-23 PROCEDURE — 70450 CT HEAD/BRAIN W/O DYE: CPT

## 2017-12-23 PROCEDURE — 99284 EMERGENCY DEPT VISIT MOD MDM: CPT

## 2017-12-23 PROCEDURE — 81003 URINALYSIS AUTO W/O SCOPE: CPT | Performed by: EMERGENCY MEDICINE

## 2017-12-23 PROCEDURE — 36415 COLL VENOUS BLD VENIPUNCTURE: CPT | Performed by: EMERGENCY MEDICINE

## 2017-12-23 NOTE — ED PROVIDER NOTES
HPI Comments: 80 y.o. female with past medical history significant for HTN, allergic rhinitis, hypercholesterolemia, CLL, A-fib, and CKD who presents from home via private vehicle with chief complaint of extremity weakness. Pt reports that she went to bed complaint free last night, but woke up this morning feeling \"tired\" and with some new weakness in RLE, which she describes as \"wobbling around. \" She notes that her fatigue this morning was unusual as she normally \"jumps out of bed\" and  Pt has a h/o stroke with residual right-sided weakness for which she recently finishing rehab. Pt also endorses a cough that is mostly present at night. She denies any pain, numbness, fever, chills, diaphoresis, N/V/D, appetite changes, weight loss, HA, or any visual distubance - including blurry or double vision. There are no other acute medical concerns at this time. Social hx: Former smoker. No current alcohol use. PCP: Lauren Ceja MD    Note written by Olman Garrison, as dictated by Kendall Porter MD 1:45 PM      The history is provided by the patient. No  was used. Past Medical History:   Diagnosis Date    A-fib [facility]) 10/31/2014    Allergic rhinitis 1/13/2010    Chronic kidney disease     CLL (chronic lymphocytic leukemia) (HCC)     HTN (hypertension) 1/13/2010    Hypercholesterolemia        History reviewed. No pertinent surgical history. History reviewed. No pertinent family history. Social History     Social History    Marital status:      Spouse name: N/A    Number of children: N/A    Years of education: N/A     Occupational History    Not on file.      Social History Main Topics    Smoking status: Former Smoker     Packs/day: 0.25     Years: 50.00     Types: Cigarettes     Quit date: 4/1/2010    Smokeless tobacco: Never Used    Alcohol use No      Comment: recently quit Friday gin    Drug use: No    Sexual activity: Not on file     Other Topics Concern    Not on file     Social History Narrative         ALLERGIES: Ace inhibitors; Amlodipine; Atenolol; Chlorthalidone; Felodipine; and Hyzaar [losartan-hydrochlorothiazide]    Review of Systems   Constitutional: Positive for fatigue. Negative for activity change, appetite change and unexpected weight change. HENT: Negative for ear pain, facial swelling, sore throat and trouble swallowing. Eyes: Negative for pain, discharge and visual disturbance. Respiratory: Positive for cough. Negative for chest tightness, shortness of breath and wheezing. Cardiovascular: Negative for chest pain and palpitations. Gastrointestinal: Negative for abdominal pain, blood in stool, nausea and vomiting. Genitourinary: Negative for difficulty urinating, flank pain and hematuria. Musculoskeletal: Negative for arthralgias, joint swelling, myalgias and neck pain. Skin: Negative for color change and rash. Neurological: Positive for weakness. Negative for dizziness, numbness and headaches. Hematological: Negative for adenopathy. Does not bruise/bleed easily. Psychiatric/Behavioral: Negative for behavioral problems, confusion and sleep disturbance. All other systems reviewed and are negative. Vitals:    12/23/17 1231 12/23/17 1233 12/23/17 1355   BP: (!) 218/80 (!) 211/99 161/82   Pulse: 63  (!) 43   Resp: 16  12   Temp: 98.3 °F (36.8 °C)     SpO2: 98%  99%   Weight: 51.3 kg (113 lb)     Height: 5' 5\" (1.651 m)              Physical Exam   Constitutional: She is oriented to person, place, and time. She appears well-developed and well-nourished. No distress. HENT:   Head: Normocephalic and atraumatic. Nose: Nose normal.   Mouth/Throat: Oropharynx is clear and moist.   Eyes: Conjunctivae and EOM are normal. Pupils are equal, round, and reactive to light. No scleral icterus. Neck: Normal range of motion. Neck supple. No JVD present. No tracheal deviation present. No thyromegaly present.    No carotid bruits noted. Cardiovascular: Normal rate, regular rhythm, normal heart sounds and intact distal pulses. Exam reveals no gallop and no friction rub. No murmur heard. Pulmonary/Chest: Effort normal and breath sounds normal. No respiratory distress. She has no wheezes. She has no rales. She exhibits no tenderness. Abdominal: Soft. Bowel sounds are normal. She exhibits no distension and no mass. There is no tenderness. There is no rebound and no guarding. Musculoskeletal: Normal range of motion. She exhibits no edema or tenderness. Lymphadenopathy:     She has no cervical adenopathy. Neurological: She is alert and oriented to person, place, and time. She has normal reflexes. No cranial nerve deficit. Coordination normal.   Mild weakness on extension of LLE. Skin: Skin is warm and dry. No rash noted. No erythema. Psychiatric: She has a normal mood and affect. Her behavior is normal. Judgment and thought content normal.   Nursing note and vitals reviewed. Note written by Olman Vee, as dictated by Mouna Madden MD 2:00 PM      MDM  Number of Diagnoses or Management Options  Right leg weakness: new and requires workup     Amount and/or Complexity of Data Reviewed  Clinical lab tests: ordered and reviewed  Tests in the radiology section of CPT®: ordered and reviewed  Decide to obtain previous medical records or to obtain history from someone other than the patient: yes  Obtain history from someone other than the patient: yes  Review and summarize past medical records: yes  Discuss the patient with other providers: yes  Independent visualization of images, tracings, or specimens: yes    Risk of Complications, Morbidity, and/or Mortality  Presenting problems: high  Diagnostic procedures: high  Management options: high    Patient Progress  Patient progress: stable    ED Course       Procedures    3:04 PM  Pt with h/o CLL, WBC today of 69,000.  All of her electrolytes look good today. PROGRESS NOTE:  3:12 PM  Pt reports that her RLE felt weak again like she would fall when she got up to go to the bathroom, same as this morning. Pt did not have this complaint when she went to sleep last night and residual weakness s/p old stroke had improved with rehab and she was having no issues walking prior to this morning. As this is a new symptom, will consult hospitalist for admission for TIA/CVA work up. Discussed with Hospitalist. He requested Teleneurologist be called. Reminded that time of onset of symptoms was some time after 8 PM last night. Consulted Dr. Aric Murphy and she suggests admission and routine stroke workup.

## 2017-12-23 NOTE — ED NOTES
Patient ambulatory to bathroom with a steady gait. Pt has cane but states she does not use it on a regular basis.

## 2017-12-23 NOTE — ED TRIAGE NOTES
Triage Note: Patient complains of her RIGHT leg being \"wobbly\" since she woke up this morning. Patient also reports feeling generally tired. NIH 0 in triage.

## 2018-07-15 ENCOUNTER — HOSPITAL ENCOUNTER (EMERGENCY)
Age: 83
Discharge: HOME | End: 2018-07-15
Attending: EMERGENCY MEDICINE
Payer: MEDICARE

## 2018-07-15 ENCOUNTER — APPOINTMENT (OUTPATIENT)
Dept: GENERAL RADIOLOGY | Age: 83
End: 2018-07-15
Attending: PHYSICIAN ASSISTANT
Payer: MEDICARE

## 2018-07-15 VITALS
WEIGHT: 113 LBS | SYSTOLIC BLOOD PRESSURE: 162 MMHG | HEART RATE: 46 BPM | BODY MASS INDEX: 18.83 KG/M2 | HEIGHT: 65 IN | OXYGEN SATURATION: 96 % | DIASTOLIC BLOOD PRESSURE: 72 MMHG | RESPIRATION RATE: 16 BRPM | TEMPERATURE: 97.9 F

## 2018-07-15 DIAGNOSIS — R05.9 COUGH: Primary | ICD-10-CM

## 2018-07-15 LAB
ANION GAP SERPL CALC-SCNC: 7 MMOL/L (ref 5–15)
ATRIAL RATE: 43 BPM
BASOPHILS # BLD: 0 K/UL (ref 0–0.1)
BASOPHILS NFR BLD: 0 % (ref 0–1)
BUN SERPL-MCNC: 17 MG/DL (ref 6–20)
BUN/CREAT SERPL: 11 (ref 12–20)
CALCIUM SERPL-MCNC: 8.6 MG/DL (ref 8.5–10.1)
CALCULATED R AXIS, ECG10: 67 DEGREES
CALCULATED T AXIS, ECG11: 86 DEGREES
CHLORIDE SERPL-SCNC: 108 MMOL/L (ref 97–108)
CO2 SERPL-SCNC: 25 MMOL/L (ref 21–32)
CREAT SERPL-MCNC: 1.51 MG/DL (ref 0.55–1.02)
DIAGNOSIS, 93000: NORMAL
DIFFERENTIAL METHOD BLD: ABNORMAL
EOSINOPHIL # BLD: 0 K/UL (ref 0–0.4)
EOSINOPHIL NFR BLD: 0 % (ref 0–7)
ERYTHROCYTE [DISTWIDTH] IN BLOOD BY AUTOMATED COUNT: 15.9 % (ref 11.5–14.5)
GLUCOSE SERPL-MCNC: 93 MG/DL (ref 65–100)
HCT VFR BLD AUTO: 34.9 % (ref 35–47)
HGB BLD-MCNC: 10.5 G/DL (ref 11.5–16)
IMM GRANULOCYTES # BLD: 0 K/UL
IMM GRANULOCYTES NFR BLD AUTO: 0 %
LYMPHOCYTES # BLD: 70.5 K/UL (ref 0.8–3.5)
LYMPHOCYTES NFR BLD: 93 % (ref 12–49)
MCH RBC QN AUTO: 30.1 PG (ref 26–34)
MCHC RBC AUTO-ENTMCNC: 30.1 G/DL (ref 30–36.5)
MCV RBC AUTO: 100 FL (ref 80–99)
MONOCYTES # BLD: 1.5 K/UL (ref 0–1)
MONOCYTES NFR BLD: 2 % (ref 5–13)
NEUTS SEG # BLD: 3.8 K/UL (ref 1.8–8)
NEUTS SEG NFR BLD: 5 % (ref 32–75)
NRBC # BLD: 0.03 K/UL (ref 0–0.01)
NRBC BLD-RTO: 0 PER 100 WBC
P-R INTERVAL, ECG05: 270 MS
PLATELET # BLD AUTO: 137 K/UL (ref 150–400)
PMV BLD AUTO: 12.2 FL (ref 8.9–12.9)
POTASSIUM SERPL-SCNC: 4.8 MMOL/L (ref 3.5–5.1)
Q-T INTERVAL, ECG07: 544 MS
QRS DURATION, ECG06: 92 MS
QTC CALCULATION (BEZET), ECG08: 459 MS
RBC # BLD AUTO: 3.49 M/UL (ref 3.8–5.2)
RBC MORPH BLD: ABNORMAL
SODIUM SERPL-SCNC: 140 MMOL/L (ref 136–145)
TROPONIN I SERPL-MCNC: <0.05 NG/ML
VENTRICULAR RATE, ECG03: 43 BPM
WBC # BLD AUTO: 75.8 K/UL (ref 3.6–11)
WBC MORPH BLD: ABNORMAL

## 2018-07-15 PROCEDURE — 36415 COLL VENOUS BLD VENIPUNCTURE: CPT | Performed by: PHYSICIAN ASSISTANT

## 2018-07-15 PROCEDURE — 99283 EMERGENCY DEPT VISIT LOW MDM: CPT

## 2018-07-15 PROCEDURE — 84484 ASSAY OF TROPONIN QUANT: CPT | Performed by: PHYSICIAN ASSISTANT

## 2018-07-15 PROCEDURE — 93005 ELECTROCARDIOGRAM TRACING: CPT

## 2018-07-15 PROCEDURE — 71046 X-RAY EXAM CHEST 2 VIEWS: CPT

## 2018-07-15 PROCEDURE — 80048 BASIC METABOLIC PNL TOTAL CA: CPT | Performed by: PHYSICIAN ASSISTANT

## 2018-07-15 PROCEDURE — 85025 COMPLETE CBC W/AUTO DIFF WBC: CPT | Performed by: PHYSICIAN ASSISTANT

## 2018-07-15 RX ORDER — AZITHROMYCIN 250 MG/1
TABLET, FILM COATED ORAL
Qty: 6 TAB | Refills: 0 | Status: SHIPPED | OUTPATIENT
Start: 2018-07-15 | End: 2018-07-20

## 2018-07-15 RX ORDER — AZITHROMYCIN 250 MG/1
TABLET, FILM COATED ORAL
Qty: 6 TAB | Refills: 0 | Status: SHIPPED | OUTPATIENT
Start: 2018-07-15 | End: 2018-07-15

## 2018-07-15 NOTE — ED TRIAGE NOTES
Pt reports having cough, chest congestion and generalized body aches since this past Friday. Pt states she is coughing up thick white mucus.

## 2018-07-15 NOTE — ED NOTES
Patient discharged by Kena Garland MD. Patient and family verbalize understanding, all questions answered.

## 2018-07-15 NOTE — ED NOTES
2:02 PM  I have evaluated the patient as the Provider in Triage. I have reviewed Her vital signs and the triage nurse assessment. I have talked with the patient and any available family and advised that I am the provider in triage and have ordered the appropriate study to initiate their work up based on the clinical presentation during my assessment. I have advised that the patient will be accommodated in the Main ED as soon as possible. I have also requested to contact the triage nurse or myself immediately if the patient experiences any changes in their condition during this brief waiting period. 79 y/o with cough and fatigue. Pt reports \"bringing up thick white sputum. \"      Marcelina Baca PA-C

## 2018-07-15 NOTE — DISCHARGE INSTRUCTIONS
Cough: Care Instructions  Your Care Instructions    A cough is your body's response to something that bothers your throat or airways. Many things can cause a cough. You might cough because of a cold or the flu, bronchitis, or asthma. Smoking, postnasal drip, allergies, and stomach acid that backs up into your throat also can cause coughs. A cough is a symptom, not a disease. Most coughs stop when the cause, such as a cold, goes away. You can take a few steps at home to cough less and feel better. Follow-up care is a key part of your treatment and safety. Be sure to make and go to all appointments, and call your doctor if you are having problems. It's also a good idea to know your test results and keep a list of the medicines you take. How can you care for yourself at home? · Drink lots of water and other fluids. This helps thin the mucus and soothes a dry or sore throat. Honey or lemon juice in hot water or tea may ease a dry cough. · Take cough medicine as directed by your doctor. · Prop up your head on pillows to help you breathe and ease a dry cough. · Try cough drops to soothe a dry or sore throat. Cough drops don't stop a cough. Medicine-flavored cough drops are no better than candy-flavored drops or hard candy. · Do not smoke. Avoid secondhand smoke. If you need help quitting, talk to your doctor about stop-smoking programs and medicines. These can increase your chances of quitting for good. When should you call for help? Call 911 anytime you think you may need emergency care.  For example, call if:    · You have severe trouble breathing.    Call your doctor now or seek immediate medical care if:    · You cough up blood.     · You have new or worse trouble breathing.     · You have a new or higher fever.     · You have a new rash.    Watch closely for changes in your health, and be sure to contact your doctor if:    · You cough more deeply or more often, especially if you notice more mucus or a change in the color of your mucus.     · You have new symptoms, such as a sore throat, an earache, or sinus pain.     · You do not get better as expected. Where can you learn more? Go to http://eugene-rohit.info/. Enter D279 in the search box to learn more about \"Cough: Care Instructions. \"  Current as of: December 6, 2017  Content Version: 11.7  © 8619-8157 [employer]. Care instructions adapted under license by [employer] (which disclaims liability or warranty for this information). If you have questions about a medical condition or this instruction, always ask your healthcare professional. [address] any warranty or liability for your use of this information.

## 2018-07-15 NOTE — ED PROVIDER NOTES
Patient is a 80 y.o. female presenting with cough. Cough   Pertinent negatives include no chest pain, no chills, no eye redness, no ear pain, no shortness of breath and no wheezing. Patient has hypertension, allergic rhinitis, hyperlipidemia, atrial fib, chronic kidney disease, and chronic lymphocytic anemia. Having a stroke in the past but does not think she has any residual. She uses a cane. She thinks her memory is okay and recalls what she had breakfast today. Patient and the family that are with her today are not really aware of the leukemia. Another family member usually takes care of patient's medical issues. Patient is here with a niece and great niece. An infant family member has recently had a cold as well as multiple other family members. Patient developed a sore throat 2 days ago. She developed a cough yesterday associated with white phlegm. She specifically denies shortness of breath and wheezing. Patient has not had shivering or fever. Past Medical History:   Diagnosis Date    A-fib [facility]) 10/31/2014    Allergic rhinitis 1/13/2010    Chronic kidney disease     CLL (chronic lymphocytic leukemia) (HCC)     HTN (hypertension) 1/13/2010    Hypercholesterolemia        No past surgical history on file. No family history on file. Social History     Social History    Marital status:      Spouse name: N/A    Number of children: N/A    Years of education: N/A     Occupational History    Not on file. Social History Main Topics    Smoking status: Former Smoker     Packs/day: 0.25     Years: 50.00     Types: Cigarettes     Quit date: 4/1/2010    Smokeless tobacco: Never Used    Alcohol use No      Comment: recently quit Friday gin    Drug use: No    Sexual activity: Not on file     Other Topics Concern    Not on file     Social History Narrative         ALLERGIES: Ace inhibitors; Amlodipine; Atenolol;  Chlorthalidone; Felodipine; and Hyzaar [losartan-hydrochlorothiazide]    Review of Systems   Constitutional: Negative for activity change, appetite change, chills and fever. HENT: Negative for ear pain and hearing loss. Eyes: Negative for discharge, redness and itching. Respiratory: Positive for cough. Negative for choking, chest tightness, shortness of breath and wheezing. Cardiovascular: Negative for chest pain and palpitations. Gastrointestinal: Negative for abdominal pain. Endocrine: Negative for polyphagia. Genitourinary: Negative for difficulty urinating and dysuria. Skin: Negative for color change, pallor and wound. Neurological: Negative for speech difficulty and weakness. Hematological: Does not bruise/bleed easily. Psychiatric/Behavioral: Negative for agitation and behavioral problems. Vitals:    07/15/18 1359 07/15/18 1500 07/15/18 1604   BP: 177/90  162/72   Pulse: (!) 46  (!) 46   Resp: 16  16   Temp: 98 °F (36.7 °C)  97.9 °F (36.6 °C)   SpO2: 96% 96% 96%   Weight: 51.3 kg (113 lb)     Height: 5' 5\" (1.651 m)              Physical Exam   Constitutional:   thin   HENT:   Head: Normocephalic and atraumatic. Mouth/Throat: Oropharynx is clear and moist.   Eyes: EOM are normal. Pupils are equal, round, and reactive to light. Neck: Normal range of motion. Neck supple. Cardiovascular: Normal rate, regular rhythm, normal heart sounds and intact distal pulses. Exam reveals no gallop and no friction rub. No murmur heard. Pulmonary/Chest: Effort normal. No respiratory distress. She has no wheezes. She has no rales. Abdominal: Soft. There is no tenderness. There is no rebound. Musculoskeletal: Normal range of motion. She exhibits no tenderness. Neurological: She is alert. No cranial nerve deficit. Motor; symmetric   Skin: No erythema. Psychiatric: She has a normal mood and affect. Her behavior is normal.   Nursing note and vitals reviewed.        [facility]      ED Course       Procedures      Note: Patient's white blood count is 75,000 with 5% neutrophils and 93% lymphocytes. Patient's niece will make sure she follows up with her primary care doctor. In the past apparently she has not had active treatment for her chronic leukemia. I talked to the niece and the patient about having a low threshold for returning to the ER if she develops shivering, fever, shortness of breath, or any worsening of her illness. At the time of discharge I think the patient has an upper respiratory infection. I covered her with azithromycin due to her immunocompromise.   Suze Najera MD  4:32 PM

## 2019-01-16 ENCOUNTER — APPOINTMENT (OUTPATIENT)
Dept: CT IMAGING | Age: 84
DRG: 194 | End: 2019-01-16
Attending: INTERNAL MEDICINE
Payer: MEDICARE

## 2019-01-16 ENCOUNTER — APPOINTMENT (OUTPATIENT)
Dept: GENERAL RADIOLOGY | Age: 84
DRG: 194 | End: 2019-01-16
Attending: EMERGENCY MEDICINE
Payer: MEDICARE

## 2019-01-16 ENCOUNTER — HOSPITAL ENCOUNTER (INPATIENT)
Age: 84
LOS: 7 days | Discharge: NURSING, REHAB OR LONG-TERM CARE | DRG: 194 | End: 2019-01-23
Attending: EMERGENCY MEDICINE | Admitting: INTERNAL MEDICINE
Payer: MEDICARE

## 2019-01-16 DIAGNOSIS — N39.0 URINARY TRACT INFECTION WITHOUT HEMATURIA, SITE UNSPECIFIED: ICD-10-CM

## 2019-01-16 DIAGNOSIS — R19.7 DIARRHEA, UNSPECIFIED TYPE: ICD-10-CM

## 2019-01-16 DIAGNOSIS — R53.1 WEAKNESS: Primary | ICD-10-CM

## 2019-01-16 LAB
ALBUMIN SERPL-MCNC: 2.4 G/DL (ref 3.5–5)
ALBUMIN/GLOB SERPL: 0.7 {RATIO} (ref 1.1–2.2)
ALP SERPL-CCNC: 92 U/L (ref 45–117)
ALT SERPL-CCNC: 196 U/L (ref 12–78)
AMORPH CRY URNS QL MICRO: ABNORMAL
AMYLASE SERPL-CCNC: 46 U/L (ref 25–115)
ANION GAP SERPL CALC-SCNC: 10 MMOL/L (ref 5–15)
APPEARANCE UR: ABNORMAL
AST SERPL-CCNC: 254 U/L (ref 15–37)
BACTERIA URNS QL MICRO: ABNORMAL /HPF
BASOPHILS # BLD: 0 K/UL (ref 0–0.1)
BASOPHILS NFR BLD: 0 % (ref 0–1)
BILIRUB SERPL-MCNC: 0.6 MG/DL (ref 0.2–1)
BILIRUB UR QL: NEGATIVE
BUN SERPL-MCNC: 25 MG/DL (ref 6–20)
BUN/CREAT SERPL: 15 (ref 12–20)
CALCIUM SERPL-MCNC: 8 MG/DL (ref 8.5–10.1)
CHLORIDE SERPL-SCNC: 104 MMOL/L (ref 97–108)
CO2 SERPL-SCNC: 25 MMOL/L (ref 21–32)
COLOR UR: ABNORMAL
CREAT SERPL-MCNC: 1.64 MG/DL (ref 0.55–1.02)
DIFFERENTIAL METHOD BLD: ABNORMAL
EOSINOPHIL # BLD: 0 K/UL (ref 0–0.4)
EOSINOPHIL NFR BLD: 0 % (ref 0–7)
EPITH CASTS URNS QL MICRO: ABNORMAL /LPF
ERYTHROCYTE [DISTWIDTH] IN BLOOD BY AUTOMATED COUNT: 16.1 % (ref 11.5–14.5)
GLOBULIN SER CALC-MCNC: 3.6 G/DL (ref 2–4)
GLUCOSE SERPL-MCNC: 86 MG/DL (ref 65–100)
GLUCOSE UR STRIP.AUTO-MCNC: NEGATIVE MG/DL
HCT VFR BLD AUTO: 37.2 % (ref 35–47)
HGB BLD-MCNC: 11.3 G/DL (ref 11.5–16)
HGB UR QL STRIP: NEGATIVE
IMM GRANULOCYTES # BLD AUTO: 0 K/UL
IMM GRANULOCYTES NFR BLD AUTO: 0 %
KETONES UR QL STRIP.AUTO: NEGATIVE MG/DL
LEUKOCYTE ESTERASE UR QL STRIP.AUTO: ABNORMAL
LIPASE SERPL-CCNC: 151 U/L (ref 73–393)
LYMPHOCYTES # BLD: 100.3 K/UL (ref 0.8–3.5)
LYMPHOCYTES NFR BLD: 93 % (ref 12–49)
MAGNESIUM SERPL-MCNC: 2.1 MG/DL (ref 1.6–2.4)
MCH RBC QN AUTO: 29.7 PG (ref 26–34)
MCHC RBC AUTO-ENTMCNC: 30.4 G/DL (ref 30–36.5)
MCV RBC AUTO: 97.6 FL (ref 80–99)
MONOCYTES # BLD: 1.1 K/UL (ref 0–1)
MONOCYTES NFR BLD: 1 % (ref 5–13)
NEUTS SEG # BLD: 6.5 K/UL (ref 1.8–8)
NEUTS SEG NFR BLD: 6 % (ref 32–75)
NITRITE UR QL STRIP.AUTO: NEGATIVE
NRBC # BLD: 0 K/UL (ref 0–0.01)
NRBC BLD-RTO: 0 PER 100 WBC
PATH REV BLD -IMP: ABNORMAL
PH UR STRIP: 6 [PH] (ref 5–8)
PHOSPHATE SERPL-MCNC: 2.6 MG/DL (ref 2.6–4.7)
PLATELET # BLD AUTO: 203 K/UL (ref 150–400)
PMV BLD AUTO: 12.7 FL (ref 8.9–12.9)
POTASSIUM SERPL-SCNC: 5 MMOL/L (ref 3.5–5.1)
PROT SERPL-MCNC: 6 G/DL (ref 6.4–8.2)
PROT UR STRIP-MCNC: ABNORMAL MG/DL
RBC # BLD AUTO: 3.81 M/UL (ref 3.8–5.2)
RBC #/AREA URNS HPF: ABNORMAL /HPF (ref 0–5)
RBC MORPH BLD: ABNORMAL
RBC MORPH BLD: ABNORMAL
SODIUM SERPL-SCNC: 139 MMOL/L (ref 136–145)
SP GR UR REFRACTOMETRY: 1.02 (ref 1–1.03)
UROBILINOGEN UR QL STRIP.AUTO: 1 EU/DL (ref 0.2–1)
WBC # BLD AUTO: 107.9 K/UL (ref 3.6–11)
WBC MORPH BLD: ABNORMAL
WBC URNS QL MICRO: ABNORMAL /HPF (ref 0–4)

## 2019-01-16 PROCEDURE — 82150 ASSAY OF AMYLASE: CPT

## 2019-01-16 PROCEDURE — 81001 URINALYSIS AUTO W/SCOPE: CPT

## 2019-01-16 PROCEDURE — 74011250636 HC RX REV CODE- 250/636: Performed by: EMERGENCY MEDICINE

## 2019-01-16 PROCEDURE — 83735 ASSAY OF MAGNESIUM: CPT

## 2019-01-16 PROCEDURE — 84100 ASSAY OF PHOSPHORUS: CPT

## 2019-01-16 PROCEDURE — 74011250636 HC RX REV CODE- 250/636: Performed by: INTERNAL MEDICINE

## 2019-01-16 PROCEDURE — 71045 X-RAY EXAM CHEST 1 VIEW: CPT

## 2019-01-16 PROCEDURE — 74011000258 HC RX REV CODE- 258: Performed by: INTERNAL MEDICINE

## 2019-01-16 PROCEDURE — 70450 CT HEAD/BRAIN W/O DYE: CPT

## 2019-01-16 PROCEDURE — 93005 ELECTROCARDIOGRAM TRACING: CPT

## 2019-01-16 PROCEDURE — 96360 HYDRATION IV INFUSION INIT: CPT

## 2019-01-16 PROCEDURE — 65270000029 HC RM PRIVATE

## 2019-01-16 PROCEDURE — 83690 ASSAY OF LIPASE: CPT

## 2019-01-16 PROCEDURE — 83520 IMMUNOASSAY QUANT NOS NONAB: CPT

## 2019-01-16 PROCEDURE — 85025 COMPLETE CBC W/AUTO DIFF WBC: CPT

## 2019-01-16 PROCEDURE — 74011250637 HC RX REV CODE- 250/637: Performed by: INTERNAL MEDICINE

## 2019-01-16 PROCEDURE — 99285 EMERGENCY DEPT VISIT HI MDM: CPT

## 2019-01-16 PROCEDURE — 80053 COMPREHEN METABOLIC PANEL: CPT

## 2019-01-16 PROCEDURE — 71250 CT THORAX DX C-: CPT

## 2019-01-16 PROCEDURE — 36415 COLL VENOUS BLD VENIPUNCTURE: CPT

## 2019-01-16 PROCEDURE — 77030005563 HC CATH URETH INT MMGH -A

## 2019-01-16 PROCEDURE — 51701 INSERT BLADDER CATHETER: CPT

## 2019-01-16 RX ORDER — ASPIRIN 325 MG
325 TABLET ORAL DAILY
COMMUNITY

## 2019-01-16 RX ORDER — HEPARIN SODIUM 5000 [USP'U]/ML
5000 INJECTION, SOLUTION INTRAVENOUS; SUBCUTANEOUS EVERY 8 HOURS
Status: DISCONTINUED | OUTPATIENT
Start: 2019-01-16 | End: 2019-01-23 | Stop reason: HOSPADM

## 2019-01-16 RX ORDER — POTASSIUM CHLORIDE 750 MG/1
10 TABLET, FILM COATED, EXTENDED RELEASE ORAL DAILY
COMMUNITY
End: 2019-01-23

## 2019-01-16 RX ORDER — FUROSEMIDE 40 MG/1
40 TABLET ORAL DAILY
Status: ON HOLD | COMMUNITY
End: 2019-01-23 | Stop reason: SDUPTHER

## 2019-01-16 RX ORDER — METOPROLOL SUCCINATE 100 MG/1
100 TABLET, EXTENDED RELEASE ORAL DAILY
COMMUNITY
End: 2019-01-23

## 2019-01-16 RX ORDER — ATORVASTATIN CALCIUM 40 MG/1
40 TABLET, FILM COATED ORAL
COMMUNITY

## 2019-01-16 RX ORDER — HYDRALAZINE HYDROCHLORIDE 25 MG/1
25 TABLET, FILM COATED ORAL
COMMUNITY

## 2019-01-16 RX ORDER — HYDRALAZINE HYDROCHLORIDE 25 MG/1
25 TABLET, FILM COATED ORAL
Status: DISCONTINUED | OUTPATIENT
Start: 2019-01-16 | End: 2019-01-23 | Stop reason: HOSPADM

## 2019-01-16 RX ORDER — SODIUM CHLORIDE 0.9 % (FLUSH) 0.9 %
5-40 SYRINGE (ML) INJECTION EVERY 8 HOURS
Status: DISCONTINUED | OUTPATIENT
Start: 2019-01-16 | End: 2019-01-23 | Stop reason: HOSPADM

## 2019-01-16 RX ORDER — ATORVASTATIN CALCIUM 40 MG/1
40 TABLET, FILM COATED ORAL
Status: DISCONTINUED | OUTPATIENT
Start: 2019-01-16 | End: 2019-01-18

## 2019-01-16 RX ORDER — ASPIRIN 325 MG
325 TABLET ORAL DAILY
Status: DISCONTINUED | OUTPATIENT
Start: 2019-01-17 | End: 2019-01-23 | Stop reason: HOSPADM

## 2019-01-16 RX ORDER — MEMANTINE HYDROCHLORIDE 5 MG/1
5 TABLET ORAL DAILY
COMMUNITY

## 2019-01-16 RX ORDER — SODIUM CHLORIDE, SODIUM LACTATE, POTASSIUM CHLORIDE, CALCIUM CHLORIDE 600; 310; 30; 20 MG/100ML; MG/100ML; MG/100ML; MG/100ML
75 INJECTION, SOLUTION INTRAVENOUS CONTINUOUS
Status: DISCONTINUED | OUTPATIENT
Start: 2019-01-16 | End: 2019-01-23

## 2019-01-16 RX ORDER — SODIUM CHLORIDE 0.9 % (FLUSH) 0.9 %
5-40 SYRINGE (ML) INJECTION AS NEEDED
Status: DISCONTINUED | OUTPATIENT
Start: 2019-01-16 | End: 2019-01-23 | Stop reason: HOSPADM

## 2019-01-16 RX ORDER — AMIODARONE HYDROCHLORIDE 200 MG/1
200 TABLET ORAL 2 TIMES DAILY
Status: DISCONTINUED | OUTPATIENT
Start: 2019-01-16 | End: 2019-01-22

## 2019-01-16 RX ORDER — AMIODARONE HYDROCHLORIDE 200 MG/1
200 TABLET ORAL 2 TIMES DAILY
COMMUNITY
End: 2019-01-23

## 2019-01-16 RX ORDER — MEMANTINE HYDROCHLORIDE 5 MG/1
5 TABLET ORAL DAILY
Status: DISCONTINUED | OUTPATIENT
Start: 2019-01-17 | End: 2019-01-23 | Stop reason: HOSPADM

## 2019-01-16 RX ORDER — METOPROLOL SUCCINATE 50 MG/1
100 TABLET, EXTENDED RELEASE ORAL DAILY
Status: DISCONTINUED | OUTPATIENT
Start: 2019-01-17 | End: 2019-01-22

## 2019-01-16 RX ADMIN — SODIUM CHLORIDE, SODIUM LACTATE, POTASSIUM CHLORIDE, AND CALCIUM CHLORIDE 75 ML/HR: 600; 310; 30; 20 INJECTION, SOLUTION INTRAVENOUS at 16:47

## 2019-01-16 RX ADMIN — ATORVASTATIN CALCIUM 40 MG: 40 TABLET, FILM COATED ORAL at 21:46

## 2019-01-16 RX ADMIN — HEPARIN SODIUM 5000 UNITS: 5000 INJECTION INTRAVENOUS; SUBCUTANEOUS at 17:21

## 2019-01-16 RX ADMIN — Medication 10 ML: at 17:22

## 2019-01-16 RX ADMIN — Medication 10 ML: at 21:46

## 2019-01-16 RX ADMIN — CEFTRIAXONE 1 G: 1 INJECTION, POWDER, FOR SOLUTION INTRAMUSCULAR; INTRAVENOUS at 18:46

## 2019-01-16 RX ADMIN — AMIODARONE HYDROCHLORIDE 200 MG: 200 TABLET ORAL at 17:21

## 2019-01-16 RX ADMIN — AZITHROMYCIN MONOHYDRATE 500 MG: 500 INJECTION, POWDER, LYOPHILIZED, FOR SOLUTION INTRAVENOUS at 19:35

## 2019-01-16 RX ADMIN — HYDRALAZINE HYDROCHLORIDE 25 MG: 25 TABLET, FILM COATED ORAL at 17:21

## 2019-01-16 RX ADMIN — SODIUM CHLORIDE 1000 ML: 900 INJECTION, SOLUTION INTRAVENOUS at 11:43

## 2019-01-16 NOTE — H&P
[address] [facility] HISTORY AND PHYSICAL Dolph Kidney 
MR#: [id number] : 10/20/1932 ACCOUNT #: [de-identified] ADMIT DATE: 2019 PRIMARY CARE PHYSICIAN:  Gomez Richmond MD 
 
SOURCE OF INFORMATION:  The patient who is not a good historian because of mental status, and the patient's daughter who was present at the bedside. CHIEF COMPLAINT:  Generalized weakness. HISTORY OF PRESENT ILLNESS:  This is an 43-year-old woman with a past medical history significant for chronic kidney disease, chronic lymphocytic leukemia, hypertension, dyslipidemia, chronic atrial fibrillation, status post CVA, was in her usual state of health until about 5 days ago when the patient developed generalized weakness, which is progressive and getting worse. The patient also complained of diarrhea, which is profuse. No blood or mucus in the stool. No sick contact. As a result of the generalized weakness, the patient has not been able to carry out activities of daily living. She is in bed most of the time. It was reported that the patient is able to ambulate with a walker, but for the past 5 days, the patient has not been able to do that because of the weakness and the diarrhea. The weakness was described as generalized weakness. No history of a fall at home. No history of fever, rigors or chills. The patient was brought to the emergency room because of worsening weakness. The weakness is associated with poor oral intake and poor appetite. When the patient arrived at the emergency room, her urinalysis was consistent with a urinary tract infection. Chest x-ray shows abnormality which could be suggestive of pneumonia as well. The patient was then referred to the hospitalist service for evaluation for admission. She was last admitted to this hospital in 2017. The patient was admitted and treated for acute CVA.  
 
PAST MEDICAL HISTORY:  Hypertension, chronic kidney disease, chronic lymphocytic leukemia, atrial fibrillation, status post CVA, dyslipidemia, dementia. ALLERGIES:  PATIENT IS ALLERGIC TO ACE INHIBITOR, ATENOLOL, AMLODIPINE, HYZAAR. MEDICATIONS:  Aspirin 325 mg daily, Lipitor 40 mg daily, Lasix 20 mg daily, metoprolol 100 mg daily,  potassium chloride 10 mEq daily. FAMILY HISTORY:  This was reviewed. Not pertinent to present illness. Mother had hypertension. PAST SURGICAL HISTORY:  Not significant. SOCIAL HISTORY:  Patient is a former smoker. Quit tobacco abuse in 2010. No history of alcohol abuse. REVIEW OF SYSTEMS:  Unable to obtain because of the patient's mental status. PHYSICAL EXAMINATION: 
GENERAL:  The patient appeared ill, in moderate distress. VITAL SIGNS:  On arrival at the emergency room, temperature 99.4, pulse 71, respiratory rate 16, blood pressure 136/94, oxygen saturation 96% on room air. HEENT:  Normocephalic, atraumatic. EYES:  Normal eye movement, no redness, no drainage, no discharge. EARS:  Normal external ears with no obvious drainage. NOSE:  No deformity, no drainage. MOUTH:  No visible oral lesion. Dry oral mucosa. NECK:  Supple, no JVD, no thyromegaly. CHEST:  Clear breath sounds. No wheezing, no crackles. HEART:  Normal S1, S2; regular. No clinically appreciable murmur. ABDOMEN:  Soft, nontender, normal bowel sounds. CENTRAL NERVOUS SYSTEM:  Alert, oriented to place and person. No gross focal neurological deficit. EXTREMITIES:  No edema. Pulses 2+ bilaterally. MUSCULOSKELETAL:  No obvious joint deformity or swelling. SKIN:  No active skin lesions seen in the exposed part of the body. PSYCHIATRIC:  Unable to assess mood and affect. LYMPHATIC SYSTEM:  No cervical lymphadenopathy. DIAGNOSTIC DATA:  Chest x-ray shows a band-like opacity at left base consistent with subsegmental atelectasis.  
 
LABORATORY DATA:  Chemistry:  Sodium of 139, potassium 5.0, chloride 104, CO2 of 25, glucose 86, BUN 25, creatinine 1.64, calcium 8.0, total bilirubin 0.6, , , alkaline phosphatase 92, total protein at 6.0, albumin level 2.4. Urinalysis: This is significant for negative nitrite, moderate leukocyte esterase, 1+ bacteria. Hematology:  .9, hemoglobin 11.3, hematocrit 37.2, platelet 913. ASSESSMENT: 
1. Generalized weakness. 2.  Acute cystitis without hematuria. 3.  Chronic kidney disease stage III. 4.  Abnormal liver function tests. 5.  Suspected bacteria pneumonia versus diarrhea. 6.  Chronic lymphocytic leukemia. 7.  Hypertension. 8.  Dyslipidemia. 9.  Dementia. ASSESSMENT AND PLAN: 
1. Generalized weakness. We will admit the patient for further evaluation and treatment. We will request a PT, OT evaluation and treatment. We requested a PT, OT evaluation and treatment. We will obtain a CT scan of the head to rule out acute pathology. We will check a TSH level. 2.  Acute cystitis without hematuria. We will start the patient on Rocephin and urine culture results if done at the emergency room. 3.  Chronic kidney disease stage III. We will monitor the patient's renal function and avoid nephrotoxic drugs. 4.  Abnormal liver function test.  This was reported as a hemolyzed sample. We will repeat liver function tests. We will check amylase and lipase level. If the abnormalities are persistent, the patient may require a gastroenterology consult. 5.  Bacteria pneumonia. This is suspected based on the chest x-ray result. The patient will be started on Rocephin and Zithromax. We will obtain a CT scan of the chest for further evaluation. 6.  Diarrhea. We will carry out with supportive therapy. We will carry out stool studies including checking stool for C. diff antigen. 7.  Chronic lymphocytic leukemia. We will continue to monitor. The patient may require the hematology consult. 8.  Hypertension. We will resume preadmission medications and monitor the patient's blood pressure closely. 9.  Dyslipidemia. We will continue with dietary therapy. 10.  Dementia. We will continue with supportive therapy. 11.  Other issues:  CODE STATUS:  PATIENT IS FULL CODE. We will place the patient on heparin for deep venous thrombosis prophylaxis. MD VIOLET Foster/ESTEBAN 
D: 01/16/2019 14:56 T: 01/16/2019 15:38 JOB #: [id number] CC: Samira Casey MD

## 2019-01-16 NOTE — PROGRESS NOTES
Admission Medication Reconciliation: 
 
Information obtained from:  patient, patient's granddaughter, patient's medication list 
 
Comments/Recommendations:  
 
Spoke with patient and her granddaughter regarding Ulises Waldron's medications. They had a list of her medications from her pharmacy, with which I updated our PTA medication list. 
 
The following changes were made to the PTA medication list: 1. Added amiodarone, hydralazine, memantine 2. Adjusted the following: - Atorvastatin 40 mg daily --> qHS 
- Furosemide 20 mg daily --> 40 mg daily Allergies and reactions were verified with the patient. Patient's pharmacy: [facility] Allergies:  Ace inhibitors; Amlodipine; Atenolol; Chlorthalidone; Felodipine; and Hyzaar [losartan-hydrochlorothiazide] Chief Complaint for this Admission: Chief Complaint Patient presents with  Fatigue  Diarrhea Significant PMH/Disease States:  
Past Medical History:  
Diagnosis Date  A[facility]fib ([facility] 10/31/2014  Allergic rhinitis 1/13/2010  Chronic kidney disease  CLL (chronic lymphocytic leukemia) ([facility]  HTN (hypertension) 1/13/2010  Hypercholesterolemia Prior to Admission Medications:  
Prior to Admission Medications Prescriptions Last Dose Informant Patient Reported? Taking?  
amiodarone (CORDARONE) 200 mg tablet 1/15/2019 at Unknown time  Yes Yes Sig: Take 200 mg by mouth two (2) times a day. aspirin (ASPIRIN) 325 mg tablet 1/15/2019 at Unknown time  Yes Yes Sig: Take 325 mg by mouth daily. atorvastatin (LIPITOR) 40 mg tablet 1/15/2019 at Unknown time  Yes Yes Sig: Take 40 mg by mouth nightly. furosemide (LASIX) 40 mg tablet 1/15/2019 at Unknown time  Yes Yes Sig: Take 40 mg by mouth daily. hydrALAZINE (APRESOLINE) 25 mg tablet 1/15/2019 at Unknown time  Yes Yes Sig: Take 25 mg by mouth three (3) times daily (with meals). memantine (NAMENDA) 5 mg tablet 1/15/2019 at Unknown time  Yes Yes Sig: Take 5 mg by mouth daily. metoprolol succinate (TOPROL-XL) 100 mg tablet 1/15/2019 at Unknown time  Yes Yes Sig: Take 100 mg by mouth daily. potassium chloride SR (KLOR-CON 10) 10 mEq tablet 1/15/2019 at Unknown time  Yes Yes Sig: Take 10 mEq by mouth daily. Facility-Administered Medications: None Thank you for allowing me to participate in this patient's care. Please call the main pharmacy at  or the [facility] pharmacist at  with any questions. Tim Vargas, PharmD

## 2019-01-16 NOTE — ROUTINE PROCESS
TRANSFER - OUT REPORT: 
 
Verbal report given to Ike (name) on Dannylis Pipes  being transferred to [facility] (unit) for routine progression of care Report consisted of patients Situation, Background, Assessment and  
Recommendations(SBAR). Information from the following report(s) SBAR and ED Summary was reviewed with the receiving nurse. Lines:  
Peripheral IV 01/16/19 Right Antecubital (Active) Opportunity for questions and clarification was provided. Patient transported with: 
 [employer]

## 2019-01-16 NOTE — PROGRESS NOTES
Primary Nurse Terese Wiggins RN and [facility], LPN performed a dual skin assessment on this patient No impairment noted Geremias score is 11

## 2019-01-16 NOTE — ED PROVIDER NOTES
80 y.o. female with past medical history significant for hypertension, CKD, CLL, and afib who presents from home via EMS with chief complaint of generalized weakness. EMS reports the pt has a five day history of generalized weakness and diarrhea. EMS states the family was concerned because the pt has been in bed and unable to take care of herself to the point that she soils herself in bed. EMS reports the pt is normally able to ambulate and take care of herself with some assistance from her family. EMS reports the pt's vital signs were stable with afib seen on their monitor, GCS 14, but she is only oriented to her name. EMS states the pt's family is on their way here. There are no other acute medical concerns at this time. Full history, physical exam, and ROS unable to be obtained due to:  dementia. Social hx - Tobacco use: former smoker, Alcohol Use: yes PCP: Clifton Chavez MD 
 
Note written by Olman Victor, as dictated by Ashley Elizabeth MD 11:23 AM. The history is provided by the EMS personnel. The history is limited by the condition of the patient. No  was used. Past Medical History:  
Diagnosis Date  A-fib ([facility] 10/31/2014  Allergic rhinitis 1/13/2010  Chronic kidney disease  CLL (chronic lymphocytic leukemia) ([facility]  HTN (hypertension) 1/13/2010  Hypercholesterolemia History reviewed. No pertinent surgical history. History reviewed. No pertinent family history. Social History Socioeconomic History  Marital status:  Spouse name: Not on file  Number of children: Not on file  Years of education: Not on file  Highest education level: Not on file Social Needs  Financial resource strain: Not on file  Food insecurity - worry: Not on file  Food insecurity - inability: Not on file  Transportation needs - medical: Not on file  Transportation needs - non-medical: Not on file Occupational History  Not on file Tobacco Use  Smoking status: Former Smoker Packs/day: 0.25 Years: 50.00 Pack years: 12.50 Types: Cigarettes Last attempt to quit: 2010 Years since quittin.8  Smokeless tobacco: Never Used Substance and Sexual Activity  Alcohol use: No  
  Alcohol/week: 1.0 - 2.0 oz Types: 2 - 4 Standard drinks or equivalent per week Comment: recently quit Friday gin  Drug use: No  
 Sexual activity: Not on file Other Topics Concern  Not on file Social History Narrative  Not on file ALLERGIES: Ace inhibitors; Amlodipine; Atenolol; Chlorthalidone; Felodipine; and Hyzaar [losartan-hydrochlorothiazide] Review of Systems Unable to perform ROS: Dementia Vitals:  
 19 1129 BP: (!) 136/94 Pulse: 71 Resp: 16 Temp: 99.4 °F (37.4 °C) SpO2: 96% Weight: 47.5 kg (104 lb 11.5 oz) Physical Exam  
Constitutional:  
Pt is thin. HENT:  
Head: Normocephalic and atraumatic. Mouth/Throat: Oropharynx is clear and moist.  
Eyes: EOM are normal. Pupils are equal, round, and reactive to light. Neck: Normal range of motion. Neck supple. Cardiovascular: Normal rate, normal heart sounds and intact distal pulses. An irregularly irregular rhythm present. Exam reveals no gallop and no friction rub. No murmur heard. Pulmonary/Chest: Effort normal. No respiratory distress. She has no wheezes. She has no rales. Abdominal: Soft. There is no tenderness. There is no rebound. Musculoskeletal: Normal range of motion. She exhibits no tenderness. Neurological: She is alert. No cranial nerve deficit. Motor; symmetric. Pt is oriented to name only. Pt is able to lift her legs against gravity. Pt follows simple commands. Skin: No erythema. Psychiatric: She has a normal mood and affect. Her behavior is normal.  
Nursing note and vitals reviewed. Note written by Olman Umana, as dictated by Laney Pretty MD 11:23 AM. MDM Procedures PROGRESS NOTE: 
1:59 PM 
Spoke with the pt's granddaughter. Granddaughter states the pt had been walking with a cane or under her own power on 1/11/19 and simply became weaker starting the following day. She reports the pt had four episodes of diarrhea yesterday. She states the pt is followed by a hematologist. She states the pt last saw her hematologist about one year ago and adds that the pt has not been on any medications for her chronic leukemia. 3:57 PM 
Patient is being admitted to the hospital.  The results of their tests and reasons for their admission have been discussed with them and/or available family. They convey agreement and understanding for the need to be admitted and for their admission diagnosis. Consultation will be made now with the inpatient physician for hospitalization.

## 2019-01-16 NOTE — PROGRESS NOTES
TRANSFER - IN REPORT: 
 
Verbal report received from Johnathon Layne RN(name) on [facility]  being received from ED(unit) for routine progression of care Report consisted of patients Situation, Background, Assessment and  
Recommendations(SBAR). Information from the following report(s) SBAR, ED Summary, MAR, Recent Results and Med Rec Status was reviewed with the receiving nurse. Opportunity for questions and clarification was provided. Assessment completed upon patients arrival to unit and care assumed.

## 2019-01-17 LAB
ALBUMIN SERPL-MCNC: 2.3 G/DL (ref 3.5–5)
ALBUMIN/GLOB SERPL: 0.7 {RATIO} (ref 1.1–2.2)
ALP SERPL-CCNC: 80 U/L (ref 45–117)
ALT SERPL-CCNC: 160 U/L (ref 12–78)
ANION GAP SERPL CALC-SCNC: 7 MMOL/L (ref 5–15)
AST SERPL-CCNC: 206 U/L (ref 15–37)
ATRIAL RATE: 76 BPM
BASOPHILS # BLD: 0 K/UL (ref 0–0.1)
BASOPHILS NFR BLD: 0 % (ref 0–1)
BILIRUB SERPL-MCNC: 0.6 MG/DL (ref 0.2–1)
BUN SERPL-MCNC: 25 MG/DL (ref 6–20)
BUN/CREAT SERPL: 19 (ref 12–20)
CALCIUM SERPL-MCNC: 7.6 MG/DL (ref 8.5–10.1)
CALCULATED R AXIS, ECG10: 79 DEGREES
CALCULATED T AXIS, ECG11: -15 DEGREES
CHLORIDE SERPL-SCNC: 106 MMOL/L (ref 97–108)
CO2 SERPL-SCNC: 26 MMOL/L (ref 21–32)
CREAT SERPL-MCNC: 1.3 MG/DL (ref 0.55–1.02)
DIAGNOSIS, 93000: NORMAL
DIFFERENTIAL METHOD BLD: ABNORMAL
EOSINOPHIL # BLD: 0 K/UL (ref 0–0.4)
EOSINOPHIL NFR BLD: 0 % (ref 0–7)
ERYTHROCYTE [DISTWIDTH] IN BLOOD BY AUTOMATED COUNT: 15.5 % (ref 11.5–14.5)
GLOBULIN SER CALC-MCNC: 3.2 G/DL (ref 2–4)
GLUCOSE BLD STRIP.AUTO-MCNC: 102 MG/DL (ref 65–100)
GLUCOSE SERPL-MCNC: 84 MG/DL (ref 65–100)
HCT VFR BLD AUTO: 31.9 % (ref 35–47)
HGB BLD-MCNC: 10.3 G/DL (ref 11.5–16)
IMM GRANULOCYTES # BLD AUTO: 0 K/UL
IMM GRANULOCYTES NFR BLD AUTO: 0 %
LYMPHOCYTES # BLD: 99.2 K/UL (ref 0.8–3.5)
LYMPHOCYTES NFR BLD: 95 % (ref 12–49)
MCH RBC QN AUTO: 30.3 PG (ref 26–34)
MCHC RBC AUTO-ENTMCNC: 32.3 G/DL (ref 30–36.5)
MCV RBC AUTO: 93.8 FL (ref 80–99)
MONOCYTES # BLD: 0 K/UL (ref 0–1)
MONOCYTES NFR BLD: 0 % (ref 5–13)
NEUTS SEG # BLD: 5.2 K/UL (ref 1.8–8)
NEUTS SEG NFR BLD: 5 % (ref 32–75)
NRBC # BLD: 0 K/UL (ref 0–0.01)
NRBC BLD-RTO: 0 PER 100 WBC
P-R INTERVAL, ECG05: 240 MS
PLATELET # BLD AUTO: 177 K/UL (ref 150–400)
PMV BLD AUTO: 12.6 FL (ref 8.9–12.9)
POTASSIUM SERPL-SCNC: 5.2 MMOL/L (ref 3.5–5.1)
PROT SERPL-MCNC: 5.5 G/DL (ref 6.4–8.2)
Q-T INTERVAL, ECG07: 334 MS
QRS DURATION, ECG06: 110 MS
QTC CALCULATION (BEZET), ECG08: 375 MS
RBC # BLD AUTO: 3.4 M/UL (ref 3.8–5.2)
RBC MORPH BLD: ABNORMAL
SERVICE CMNT-IMP: ABNORMAL
SODIUM SERPL-SCNC: 139 MMOL/L (ref 136–145)
VENTRICULAR RATE, ECG03: 76 BPM
WBC # BLD AUTO: 104.4 K/UL (ref 3.6–11)
WBC MORPH BLD: ABNORMAL

## 2019-01-17 PROCEDURE — 74011250637 HC RX REV CODE- 250/637: Performed by: INTERNAL MEDICINE

## 2019-01-17 PROCEDURE — 36415 COLL VENOUS BLD VENIPUNCTURE: CPT

## 2019-01-17 PROCEDURE — 65270000029 HC RM PRIVATE

## 2019-01-17 PROCEDURE — 74011250636 HC RX REV CODE- 250/636: Performed by: INTERNAL MEDICINE

## 2019-01-17 PROCEDURE — 97530 THERAPEUTIC ACTIVITIES: CPT

## 2019-01-17 PROCEDURE — 97165 OT EVAL LOW COMPLEX 30 MIN: CPT

## 2019-01-17 PROCEDURE — 87040 BLOOD CULTURE FOR BACTERIA: CPT

## 2019-01-17 PROCEDURE — 82962 GLUCOSE BLOOD TEST: CPT

## 2019-01-17 PROCEDURE — 85025 COMPLETE CBC W/AUTO DIFF WBC: CPT

## 2019-01-17 PROCEDURE — 74011000258 HC RX REV CODE- 258: Performed by: INTERNAL MEDICINE

## 2019-01-17 PROCEDURE — 80053 COMPREHEN METABOLIC PANEL: CPT

## 2019-01-17 PROCEDURE — 97161 PT EVAL LOW COMPLEX 20 MIN: CPT

## 2019-01-17 RX ADMIN — AMIODARONE HYDROCHLORIDE 200 MG: 200 TABLET ORAL at 17:05

## 2019-01-17 RX ADMIN — HYDRALAZINE HYDROCHLORIDE 25 MG: 25 TABLET, FILM COATED ORAL at 07:11

## 2019-01-17 RX ADMIN — ASPIRIN 325 MG: 325 TABLET ORAL at 10:00

## 2019-01-17 RX ADMIN — Medication 1 CAPSULE: at 09:58

## 2019-01-17 RX ADMIN — ATORVASTATIN CALCIUM 40 MG: 40 TABLET, FILM COATED ORAL at 21:45

## 2019-01-17 RX ADMIN — HEPARIN SODIUM 5000 UNITS: 5000 INJECTION INTRAVENOUS; SUBCUTANEOUS at 17:06

## 2019-01-17 RX ADMIN — CEFTRIAXONE 1 G: 1 INJECTION, POWDER, FOR SOLUTION INTRAMUSCULAR; INTRAVENOUS at 17:00

## 2019-01-17 RX ADMIN — HEPARIN SODIUM 5000 UNITS: 5000 INJECTION INTRAVENOUS; SUBCUTANEOUS at 01:36

## 2019-01-17 RX ADMIN — HEPARIN SODIUM 5000 UNITS: 5000 INJECTION INTRAVENOUS; SUBCUTANEOUS at 10:00

## 2019-01-17 RX ADMIN — AMIODARONE HYDROCHLORIDE 200 MG: 200 TABLET ORAL at 10:00

## 2019-01-17 RX ADMIN — MEMANTINE 5 MG: 5 TABLET ORAL at 09:56

## 2019-01-17 RX ADMIN — AZITHROMYCIN MONOHYDRATE 500 MG: 500 INJECTION, POWDER, LYOPHILIZED, FOR SOLUTION INTRAVENOUS at 17:02

## 2019-01-17 NOTE — PROGRESS NOTES
Hospitalist Progress Note Karine Millan MD 
Answering service: [phone] OR [phone] from in house phone Cell: [phone] Date of Service:  2019 NAME:  Jhon Abraham :  10/20/1932 MRN:  [id number] Admission Summary:  
 
51-year-old woman with a past medical history significant for chronic kidney disease, chronic lymphocytic leukemia, hypertension, dyslipidemia, chronic atrial fibrillation, status post CVA, was in her usual state of health until about 5 days ago when the patient developed generalized weakness, which is progressive and getting worse. The patient also complained of diarrhea, which is profuse. No blood or mucus in the stool. No sick contact. As a result of the generalized weakness, the patient has not been able to carry out activities of daily living. She is in bed most of the time. It was reported that the patient is able to ambulate with a walker, but for the past 5 days, the patient has not been able to do that because of the weakness and the diarrhea. The weakness was described as generalized weakness. No history of a fall at home. No history of fever, rigors or chills. The patient was brought to the emergency room because of worsening weakness. The weakness is associated with poor oral intake and poor appetite. When the patient arrived at the emergency room, her urinalysis was consistent with a urinary tract infection. Chest x-ray shows abnormality which could be suggestive of pneumonia as well. The patient was then referred to the hospitalist service for evaluation for admission. She was last admitted to this hospital in 2017. The patient was admitted and treated for acute CVA. Interval history / Subjective:  
  
Patient seen and examined at the bedside. Oriented to self. Provides no history. Discussed with grand-daughter. Patient has been having some cough. Explained she is being treated for PNA. Asked when patient last saw Onc - stating maybe 1.5 years ago. Will consult Oncology to re-establish patient with care provider. Assessment & Plan:  
 
Generalized weakness: 
-CT head negative for acute findings 
-likely secondary to PNA 
-PT/OT Acute cystitis without hematuria, difficult to tell if this is a source of infection. Patient on abx regardless. Chronic kidney disease stage III, monitor. Abnormal liver function test, down-trending. Bacterial pneumonia with LLL infiltrate on Chest CT. 
-will draw blood cultures (though abx already started) 
-sputum culture 
-continue on Rocephin and Zithromax Chronic lymphocytic leukemia, lost to follow-up. -will consult Oncology while here given WBC of over 100 Hypertension: Held Toprol for hypotension this AM 
 
Dementia, supportive therapy. Code status: Full DVT prophylaxis: heparin Care Plan discussed with: Patient/Family and Nurse Disposition: TBD Hospital Problems  Date Reviewed: 1/16/2019 Codes Class Noted POA * (Principal) Generalized weakness ICD-10-CM: R53.1 ICD-9-CM: 780.79  1/16/2019 Yes Review of Systems:  
Review of systems not obtained due to patient factors. Vital Signs:  
 Last 24hrs VS reviewed since prior progress note. Most recent are: 
Visit Vitals /63 (BP 1 Location: Right arm, BP Patient Position: At rest) Pulse 84 Temp 98.3 °F (36.8 °C) Resp 18 Wt 47.5 kg (104 lb 11.5 oz) SpO2 100% Breastfeeding? No  
BMI 17.43 kg/m² Intake/Output Summary (Last 24 hours) at 1/17/2019 1546 Last data filed at 1/17/2019 0900 Gross per 24 hour Intake 50 ml Output  Net 50 ml Physical Examination:  
 
  
Constitutional:  No acute distress, cooperative, pleasant   
ENT:  Neck supple Resp:  Slightly decreased sounds L base. No accessory muscle use CV:  Regular rhythm, normal rate GI:  Soft, non distended, non tender, normoactive bowel sounds Musculoskeletal:  No edema, warm Neurologic:  Moves all extremities. Alert to self Data Review:  
 Review and/or order of clinical lab test 
Review and/or order of tests in the radiology section of CPT Review and/or order of tests in the medicine section of CPT Labs:  
 
Recent Labs  
  01/17/19 0135 01/16/19 
1140 .4* 107.9* HGB 10.3* 11.3* HCT 31.9* 37.2  203 Recent Labs  
  01/17/19 0135 01/16/19 
1658 01/16/19 
1140   --  139  
K 5.2*  --  5.0  
  --  104 CO2 26  --  25 BUN 25*  --  25* CREA 1.30*  --  1.64* GLU 84  --  86  
CA 7.6*  --  8.0*  
MG  --  2.1  --   
PHOS  --  2.6  --   
 
Recent Labs  
  01/17/19 0135 01/16/19 1658 01/16/19 
1140 SGOT 206*  --  254* *  --  196* AP 80  --  92  
TBILI 0.6  --  0.6 TP 5.5*  --  6.0* ALB 2.3*  --  2.4*  
GLOB 3.2  --  3.6 AML  --  46  --   
LPSE  --  151  -- No results for input(s): INR, PTP, APTT in the last 72 hours. No lab exists for component: INREXT No results for input(s): FE, TIBC, PSAT, FERR in the last 72 hours. No results found for: FOL, RBCF No results for input(s): PH, PCO2, PO2 in the last 72 hours. No results for input(s): CPK, CKNDX, TROIQ in the last 72 hours. No lab exists for component: CPKMB Lab Results Component Value Date/Time Cholesterol, total 145 07/01/2017 02:48 AM  
 HDL Cholesterol 60 07/01/2017 02:48 AM  
 LDL, calculated 72.8 07/01/2017 02:48 AM  
 Triglyceride 61 07/01/2017 02:48 AM  
 CHOL/HDL Ratio 2.4 07/01/2017 02:48 AM  
 
Lab Results Component Value Date/Time Glucose (POC) 102 (H) 01/17/2019 10:10 AM  
 Glucose (POC) 114 (H) 07/08/2017 09:43 PM  
 Glucose (POC) 92 06/30/2017 06:49 AM  
 Glucose (POC) 90 11/02/2014 12:20 PM  
 
Lab Results Component Value Date/Time  Color DARK YELLOW 01/16/2019 11:40 AM  
 Appearance CLOUDY (A) 01/16/2019 11:40 AM  
 Specific gravity 1.020 01/16/2019 11:40 AM  
 pH (UA) 6.0 01/16/2019 11:40 AM  
 Protein TRACE (A) 01/16/2019 11:40 AM  
 Glucose NEGATIVE  01/16/2019 11:40 AM  
 Ketone NEGATIVE  01/16/2019 11:40 AM  
 Bilirubin NEGATIVE  01/16/2019 11:40 AM  
 Urobilinogen 1.0 01/16/2019 11:40 AM  
 Nitrites NEGATIVE  01/16/2019 11:40 AM  
 Leukocyte Esterase MODERATE (A) 01/16/2019 11:40 AM  
 Epithelial cells FEW 01/16/2019 11:40 AM  
 Bacteria 1+ (A) 01/16/2019 11:40 AM  
 WBC 5-10 01/16/2019 11:40 AM  
 RBC 0-5 01/16/2019 11:40 AM  
 
 
 
Medications Reviewed:  
 
Current Facility-Administered Medications Medication Dose Route Frequency  lactated Ringers infusion  75 mL/hr IntraVENous CONTINUOUS  
 amiodarone (CORDARONE) tablet 200 mg  200 mg Oral BID  aspirin tablet 325 mg  325 mg Oral DAILY  atorvastatin (LIPITOR) tablet 40 mg  40 mg Oral QHS  hydrALAZINE (APRESOLINE) tablet 25 mg  25 mg Oral TID WITH MEALS  memantine (NAMENDA) tablet 5 mg  5 mg Oral DAILY  metoprolol succinate (TOPROL-XL) XL tablet 100 mg  100 mg Oral DAILY  sodium chloride (NS) flush 5-40 mL  5-40 mL IntraVENous Q8H  
 sodium chloride (NS) flush 5-40 mL  5-40 mL IntraVENous PRN  
 heparin (porcine) injection 5,000 Units  5,000 Units SubCUTAneous Q8H  
 lactobac ac& pc-s.therm-b.anim (JEREMIAH Q/RISAQUAD)  1 Cap Oral DAILY  cefTRIAXone (ROCEPHIN) 1 g in 0.9% sodium chloride (MBP/ADV) 50 mL  1 g IntraVENous Q24H  
 azithromycin (ZITHROMAX) 500 mg in 0.9% sodium chloride 250 mL ([id number])  500 mg IntraVENous Q24H  
 
______________________________________________________________________ EXPECTED LENGTH OF STAY: 3d 7h 
ACTUAL LENGTH OF STAY:          1 Zeferino Erickson MD

## 2019-01-17 NOTE — PROGRESS NOTES
Problem: Mobility Impaired (Adult and Pediatric) Goal: *Acute Goals and Plan of Care (Insert Text) Physical Therapy Goals Initiated 1/17/2019 1. Patient will move from supine to sit and sit to supine  and roll side to side in bed with minimal assistance/contact guard assist within 7 day(s). 2.  Patient will transfer from bed to chair and chair to bed with moderate assistance  using the least restrictive device within 7 day(s). 3.  Patient will perform sit to stand with moderate assistance  within 7 day(s). 4.  Patient will ambulate with minimal assistance/contact guard assist for 25 feet with the least restrictive device within 7 day(s). physical Therapy EVALUATION Patient: Hallie Pedroza (50 y.o. female) Date: 1/17/2019 Primary Diagnosis: Generalized weakness Precautions: enteric;   Contact, Fall ASSESSMENT : 
Based on the objective data described below, the patient presents with overall decreased mobility as seen in bed mobility, transfers and inability to ambulate at this time. Patient very lethargic and presenting well below her baseline of modified independent with walker. She is limited by: decreased alertness Decreased strength Inability to walk Max assist for all bed mobility. Patient will benefit from skilled intervention to address the above impairments. Patients rehabilitation potential is considered to be Good Factors which may influence rehabilitation potential include:  
[]         None noted 
[x]         Mental ability/status []         Medical condition 
[x]         Home/family situation and support systems 
[]         Safety awareness 
[]         Pain tolerance/management 
[]         Other: PLAN : 
Recommendations and Planned Interventions: 
[x]           Bed Mobility Training             []    Neuromuscular Re-Education [x]           Transfer Training                   []    Orthotic/Prosthetic Training 
[x]           Gait Training                         []    Modalities [x]           Therapeutic Exercises           []    Edema Management/Control 
[x]           Therapeutic Activities            [x]    Patient and Family Training/Education 
[]           Other (comment): Frequency/Duration: Patient will be followed by physical therapy  5 times a week to address goals. Discharge Recommendations: Rehab: likely SNF Further Equipment Recommendations for Discharge: TBD SUBJECTIVE:  
Patient with minimal verbal responses, mainly head nods. OBJECTIVE DATA SUMMARY:  
HISTORY:   
Past Medical History:  
Diagnosis Date  A-fib ([facility] 10/31/2014  Allergic rhinitis 1/13/2010  Chronic kidney disease  CLL (chronic lymphocytic leukemia) ([facility].)  HTN (hypertension) 1/13/2010  Hypercholesterolemia History reviewed. No pertinent surgical history. Prior Level of Function/Home Situation: unsure but appears to have been mod I with mobility using a walker; lives with niece who may or may not be home 24/7. Personal factors and/or comorbidities impacting plan of care: CVA in past 
 
Home Situation Home Environment: Apartment # Steps to Enter: 1 One/Two Story Residence: One story Living Alone: No 
Support Systems: Family member(s) Patient Expects to be Discharged to[de-identified] [facility] Current DME Used/Available at Home: None(Niece) May have walker EXAMINATION/PRESENTATION/DECISION MAKING: Critical Behavior: 
Neurologic State: Drowsy Orientation Level: Oriented to person Cognition: Decreased command following, Decreased attention/concentration Safety/Judgement: Decreased insight into deficits, Decreased awareness of need for safety, Decreased awareness of need for assistance Hearing: Auditory Auditory Impairment: NoneSkin:  Refer to nursing notes Edema: none Range Of Motion: 
AROM: Generally decreased, functional 
  
 Strength:   
Strength: Grossly decreased, non-functional 
  
  
  
  
  
  
Tone & Sensation:  
  
  
  
  
  
Sensation: Intact(to light touch) Coordination: 
Coordination: Grossly decreased, non-functional 
Vision:  
Tracking: (difficult to assess 2* decreased alertness, L gaze preferenc) Functional Mobility: 
Bed Mobility: 
Rolling: Maximum assistance;Assist x2 Supine to Sit: Maximum assistance;Assist x2 Sit to Supine: Maximum assistance;Assist x2 Scooting: Maximum assistance;Assist x1 Transfers: 
Sit to Stand: Moderate assistance;Assist x2; Additional time Balance:  
Sitting: Impaired; With support Sitting - Static: Poor (constant support)(progressing to fair as levels of alertness increased) Sitting - Dynamic: Poor (constant support) Standing: Impaired; With support Standing - Static: PoorAmbulation/Gait Training: unable at this time Pain: 
Pain Scale 1: Numeric (0 - 10) Pain Intensity 1: 0 Activity Tolerance:  
vss Please refer to the flowsheet for vital signs taken during this treatment. After treatment:  
[]         Patient left in no apparent distress sitting up in chair 
[x]         Patient left in no apparent distress in bed 
[x]         Call bell left within reach [x]         Nursing notified 
[]         Caregiver present 
[]         Bed alarm activated COMMUNICATION/EDUCATION:  
The patients plan of care was discussed with: Occupational Therapist, Registered Nurse and . []         Fall prevention education was provided and the patient/caregiver indicated understanding. []         Patient/family have participated as able in goal setting and plan of care. []         Patient/family agree to work toward stated goals and plan of care. []         Patient understands intent and goals of therapy, but is neutral about his/her participation. [x]         Patient is unable to participate in goal setting and plan of care. Thank you for this referral. 
Alonzo Landau, PT Time Calculation: 15 mins

## 2019-01-17 NOTE — PROGRESS NOTES
Occupational Therapy Goals Initiated 1/17/2019 1. Patient will perform self-feeding with supervision/set-up within 7 day(s). 2.  Patient will perform grooming task EOB without support with supervision/set-up within 7 day(s). 3.  Patient will perform seated bathing with minimal assistance/contact guard assist within 7 day(s). 4.  Patient will perform toilet transfers to [facility] with RW with moderate assistance  within 7 day(s). 5.  Patient will perform all aspects of toileting with moderate assistance  within 7 day(s). Occupational Therapy EVALUATION Patient: Susan Martínez (38 y.o. female) Date: 1/17/2019 Primary Diagnosis: Generalized weakness Precautions:   Contact, Fall ASSESSMENT : 
Based on the objective data described below, the patient presents with residual R sided weakness, decreased levels of alertness and orientation x 1, impaired sitting balance, generalized weakness/debility from prolonged bed rest and significant decline in functional status s/p admission for weakness and diarrhea x 4 days. Pt very drowsy during session with max verbal and tactile cues for alertness. This date, pt was max A x 2 for bed mobility with initial poor sitting balance 2* strong L lateral lean. Pt able to progress to fair balance with CGA at times while EOB. Mod A x 2 for sit to stand, poor center of gravity with pt unable to side step to [facility]. She is functioning below her ADL baseline, needing min-mod A for brining drink to mouth and grooming with L and R UE respectively, max to total for all other ADLs. Hopefully will progress with her ADL independence once levels of alertness increase. Pt will likely need SNF rehab at discharge given her current debility. Patient will benefit from skilled intervention to address the above impairments. Patients rehabilitation potential is considered to be Good Factors which may influence rehabilitation potential include:  
[]             None noted [x]             Mental ability/status [x]             Medical condition []             Home/family situation and support systems []             Safety awareness []             Pain tolerance/management 
[]             Other: PLAN : 
Recommendations and Planned Interventions: 
[x]               Self Care Training                  [x]        Therapeutic Activities [x]               Functional Mobility Training    []        Cognitive Retraining 
[x]               Therapeutic Exercises           [x]        Endurance Activities [x]               Balance Training                   []        Neuromuscular Re-Education []               Visual/Perceptual Training     [x]   Home Safety Training 
[x]               Patient Education                 [x]        Family Training/Education []               Other (comment): Frequency/Duration: Patient will be followed by occupational therapy 4 times a week to address goals. Discharge Recommendations: Louis Guzman Further Equipment Recommendations for Discharge: TBD SUBJECTIVE:  
Patient stated Ok.  OBJECTIVE DATA SUMMARY:  
HISTORY:  
Past Medical History:  
Diagnosis Date  A-fib ([facility] 10/31/2014  Allergic rhinitis 1/13/2010  Chronic kidney disease  CLL (chronic lymphocytic leukemia) ([facility]  HTN (hypertension) 1/13/2010  Hypercholesterolemia History reviewed. No pertinent surgical history. Prior Level of Function/Environment/Context: lives at home with her niece. Pt poor historian. Per chart, ambulates with RW, home alone at times. Pt stated she was able to bathe and dress herself. Hx of CVA 7/2017 with R sided weakness Occupations in which the patient is/was successful, what are the barriers preventing that success:  
Performance Patterns (routines, roles, habits, and rituals):  
Personal Interests and/or values:  
Expanded or extensive additional review of patient history: CVA, HTN, A. Fib Home Situation Home Environment: Apartment # Steps to Enter: 1 One/Two Story Residence: One story Living Alone: No 
Support Systems: Family member(s) Patient Expects to be Discharged to[de-identified] [facility] Current DME Used/Available at Home: None(Niece) Hand dominance: unsure, pt used L UE to bring drink to mouth, R hand to wash face. Attempted to ask pt twice which hand was dominant. Pt did not respond EXAMINATION OF PERFORMANCE DEFICITS: 
Cognitive/Behavioral Status: 
Neurologic State: Drowsy Orientation Level: Oriented to person Cognition: Decreased command following;Decreased attention/concentration Perception: Cues to maintain midline in sitting; Tactile;Verbal 
Perseveration: No perseveration noted Safety/Judgement: Decreased insight into deficits; Decreased awareness of need for safety;Decreased awareness of need for assistance Skin: intact Edema: none noted Hearing: Auditory Auditory Impairment: None Vision/Perceptual:   
Tracking: (difficult to assess 2* decreased alertness, L gaze preferenc) Range of Motion: 
 
AROM: Generally decreased, functional 
  
  
  
  
  
  
  
Strength: 
 
Strength: Grossly decreased, non-functional 
  
  
  
  
 
Coordination: 
Coordination: Grossly decreased, non-functional 
Fine Motor Skills-Upper: Left Impaired;Right Impaired Gross Motor Skills-Upper: Left Impaired;Right Impaired Tone & Sensation: 
 
  
Sensation: Intact(to light touch) Balance: 
Sitting: Impaired; With support Sitting - Static: Poor (constant support)(progressing to fair as levels of alertness increased) Sitting - Dynamic: Poor (constant support) Standing: Impaired; With support Standing - Static: Poor Functional Mobility and Transfers for ADLs:Bed Mobility: 
Rolling: Maximum assistance;Assist x2 Supine to Sit: Maximum assistance;Assist x2 Sit to Supine: Maximum assistance;Assist x2 Scooting: Maximum assistance;Assist x1 Transfers: Sit to Stand: Moderate assistance;Assist x2; Additional time ADL Assessment: 
Feeding: Moderate assistance;Minimum assistance(bringing can to mouth with L UE') Oral Facial Hygiene/Grooming: Moderate assistance(due to drowsiness, wash face wtih R hand) Bathing: Maximum assistance Upper Body Dressing: Maximum assistance Lower Body Dressing: Total assistance Toileting: Total assistance ADL Intervention and task modifications: 
  
 
  
 
  
 
  
 
  
 
  
 
  
 
Cognitive Retraining Safety/Judgement: Decreased insight into deficits; Decreased awareness of need for safety;Decreased awareness of need for assistance Functional Measure: 
Barthel Index: 
 
Bathin Bladder: 0 Bowels: 5 Groomin Dressin Feedin Mobility: 0 Stairs: 0 Toilet Use: 0 Transfer (Bed to Chair and Back): 0 Total: 10 The Barthel ADL Index: Guidelines 1. The index should be used as a record of what a patient does, not as a record of what a patient could do. 2. The main aim is to establish degree of independence from any help, physical or verbal, however minor and for whatever reason. 3. The need for supervision renders the patient not independent. 4. A patient's performance should be established using the best available evidence. Asking the patient, friends/relatives and nurses are the usual sources, but direct observation and common sense are also important. However direct testing is not needed. 5. Usually the patient's performance over the preceding 24-48 hours is important, but occasionally longer periods will be relevant. 6. Middle categories imply that the patient supplies over 50 per cent of the effort. 7. Use of aids to be independent is allowed. Orly Cano., Barthel, D.W. (8670). Functional evaluation: the Barthel Index. [facility] (14)2.  
MANUELA Swartz, Cinthya Franklin., Mehrdad Parrish., Bernarda Spicer (1999). Measuring the change indisability after inpatient rehabilitation; comparison of the responsiveness of the Barthel Index and Functional Comal Measure. Journal of Neurology, Neurosurgery, and Psychiatry, 66(4), 570-518. AISHWARYA Fox, SHAW Portillo, & Margie Ramon M.A. (2004.) Assessment of post-stroke quality of life in cost-effectiveness studies: The usefulness of the Barthel Index and the EuroQoL-5D. [facility], 13, 547-57 Occupational Therapy Evaluation Charge Determination History Examination Decision-Making LOW Complexity : Brief history review  LOW Complexity : 1-3 performance deficits relating to physical, cognitive , or psychosocial skils that result in activity limitations and / or participation restrictions  MEDIUM Complexity : Patient may present with comorbidities that affect occupational performnce. Miniml to moderate modification of tasks or assistance (eg, physical or verbal ) with assesment(s) is necessary to enable patient to complete evaluation Based on the above components, the patient evaluation is determined to be of the following complexity level: LOW Pain: 
Pain Scale 1: Numeric (0 - 10) Pain Intensity 1: 0 Activity Tolerance: /54 EOB Please refer to the flowsheet for vital signs taken during this treatment. After treatment:  
[] Patient left in no apparent distress sitting up in chair 
[x] Patient left in no apparent distress in bed 
[x] Call bell left within reach [x] Nursing notified 
[] Caregiver present 
[] Bed alarm activated COMMUNICATION/EDUCATION:  
The patients plan of care was discussed with: Physical Therapist and Registered Nurse. 
[] Home safety education was provided and the patient/caregiver indicated understanding. [] Patient/family have participated as able in goal setting and plan of care. [] Patient/family agree to work toward stated goals and plan of care. [] Patient understands intent and goals of therapy, but is neutral about his/her participation. [x] Patient is unable to participate in goal setting and plan of care. This patients plan of care is appropriate for delegation to [facility]. Thank you for this referral. 
Gladis Buenrostro OT Time Calculation: 23 mins

## 2019-01-17 NOTE — PROGRESS NOTES
Problem: Falls - Risk of 
Goal: *Absence of Falls Document Odin Workman Fall Risk and appropriate interventions in the flowsheet. Outcome: Progressing Towards Goal 
Fall Risk Interventions: 
  
 
Mentation Interventions: Adequate sleep, hydration, pain control, Door open when patient unattended, Evaluate medications/consider consulting pharmacy, More frequent rounding, Reorient patient, Room close to nurse's station, Toileting rounds, Update white board Medication Interventions: Evaluate medications/consider consulting pharmacy Elimination Interventions: Call light in reach, Toileting schedule/hourly rounds

## 2019-01-18 LAB
ANION GAP SERPL CALC-SCNC: 7 MMOL/L (ref 5–15)
ANION GAP SERPL CALC-SCNC: 7 MMOL/L (ref 5–15)
BASOPHILS # BLD: 0 K/UL (ref 0–0.1)
BASOPHILS NFR BLD: 0 % (ref 0–1)
BUN SERPL-MCNC: 19 MG/DL (ref 6–20)
BUN SERPL-MCNC: 20 MG/DL (ref 6–20)
BUN/CREAT SERPL: 17 (ref 12–20)
BUN/CREAT SERPL: 17 (ref 12–20)
CALCIUM SERPL-MCNC: 7 MG/DL (ref 8.5–10.1)
CALCIUM SERPL-MCNC: 8 MG/DL (ref 8.5–10.1)
CHLORIDE SERPL-SCNC: 107 MMOL/L (ref 97–108)
CHLORIDE SERPL-SCNC: 108 MMOL/L (ref 97–108)
CO2 SERPL-SCNC: 23 MMOL/L (ref 21–32)
CO2 SERPL-SCNC: 24 MMOL/L (ref 21–32)
CREAT SERPL-MCNC: 1.14 MG/DL (ref 0.55–1.02)
CREAT SERPL-MCNC: 1.18 MG/DL (ref 0.55–1.02)
DIFFERENTIAL METHOD BLD: ABNORMAL
EOSINOPHIL # BLD: 0 K/UL (ref 0–0.4)
EOSINOPHIL NFR BLD: 0 % (ref 0–7)
ERYTHROCYTE [DISTWIDTH] IN BLOOD BY AUTOMATED COUNT: 15.6 % (ref 11.5–14.5)
GLUCOSE SERPL-MCNC: 73 MG/DL (ref 65–100)
GLUCOSE SERPL-MCNC: 76 MG/DL (ref 65–100)
HCT VFR BLD AUTO: 31.9 % (ref 35–47)
HGB BLD-MCNC: 10 G/DL (ref 11.5–16)
IMM GRANULOCYTES # BLD AUTO: 0 K/UL
IMM GRANULOCYTES NFR BLD AUTO: 0 %
LYMPHOCYTES # BLD: 90.2 K/UL (ref 0.8–3.5)
LYMPHOCYTES NFR BLD: 95 % (ref 12–49)
MCH RBC QN AUTO: 29.8 PG (ref 26–34)
MCHC RBC AUTO-ENTMCNC: 31.3 G/DL (ref 30–36.5)
MCV RBC AUTO: 94.9 FL (ref 80–99)
MONOCYTES # BLD: 0 K/UL (ref 0–1)
MONOCYTES NFR BLD: 0 % (ref 5–13)
NEUTS SEG # BLD: 4.7 K/UL (ref 1.8–8)
NEUTS SEG NFR BLD: 5 % (ref 32–75)
NRBC # BLD: 0 K/UL (ref 0–0.01)
NRBC BLD-RTO: 0 PER 100 WBC
PLATELET # BLD AUTO: 175 K/UL (ref 150–400)
PLATELET COMMENTS,PCOM: ABNORMAL
PMV BLD AUTO: 12 FL (ref 8.9–12.9)
POTASSIUM SERPL-SCNC: 5.2 MMOL/L (ref 3.5–5.1)
POTASSIUM SERPL-SCNC: 6 MMOL/L (ref 3.5–5.1)
POTASSIUM SERPL-SCNC: 6.1 MMOL/L (ref 3.5–5.1)
RBC # BLD AUTO: 3.36 M/UL (ref 3.8–5.2)
RBC MORPH BLD: ABNORMAL
SODIUM SERPL-SCNC: 138 MMOL/L (ref 136–145)
SODIUM SERPL-SCNC: 138 MMOL/L (ref 136–145)
TSH RECEP AB SER-ACNC: <0.5 IU/L (ref 0–1.75)
WBC # BLD AUTO: 94.9 K/UL (ref 3.6–11)
WBC MORPH BLD: ABNORMAL

## 2019-01-18 PROCEDURE — 80048 BASIC METABOLIC PNL TOTAL CA: CPT

## 2019-01-18 PROCEDURE — 65270000029 HC RM PRIVATE

## 2019-01-18 PROCEDURE — 74011250637 HC RX REV CODE- 250/637: Performed by: INTERNAL MEDICINE

## 2019-01-18 PROCEDURE — 74011000258 HC RX REV CODE- 258: Performed by: INTERNAL MEDICINE

## 2019-01-18 PROCEDURE — 36415 COLL VENOUS BLD VENIPUNCTURE: CPT

## 2019-01-18 PROCEDURE — 84132 ASSAY OF SERUM POTASSIUM: CPT

## 2019-01-18 PROCEDURE — 92610 EVALUATE SWALLOWING FUNCTION: CPT | Performed by: SPEECH-LANGUAGE PATHOLOGIST

## 2019-01-18 PROCEDURE — 74011250636 HC RX REV CODE- 250/636: Performed by: INTERNAL MEDICINE

## 2019-01-18 PROCEDURE — 85025 COMPLETE CBC W/AUTO DIFF WBC: CPT

## 2019-01-18 RX ORDER — SODIUM POLYSTYRENE SULFONATE 15 G/60ML
45 SUSPENSION ORAL; RECTAL
Status: DISCONTINUED | OUTPATIENT
Start: 2019-01-18 | End: 2019-01-18

## 2019-01-18 RX ADMIN — AMIODARONE HYDROCHLORIDE 200 MG: 200 TABLET ORAL at 09:26

## 2019-01-18 RX ADMIN — Medication 1 CAPSULE: at 09:25

## 2019-01-18 RX ADMIN — HEPARIN SODIUM 5000 UNITS: 5000 INJECTION INTRAVENOUS; SUBCUTANEOUS at 18:04

## 2019-01-18 RX ADMIN — HEPARIN SODIUM 5000 UNITS: 5000 INJECTION INTRAVENOUS; SUBCUTANEOUS at 00:42

## 2019-01-18 RX ADMIN — MEMANTINE 5 MG: 5 TABLET ORAL at 09:33

## 2019-01-18 RX ADMIN — Medication 10 ML: at 22:14

## 2019-01-18 RX ADMIN — HYDRALAZINE HYDROCHLORIDE 25 MG: 25 TABLET, FILM COATED ORAL at 18:04

## 2019-01-18 RX ADMIN — HEPARIN SODIUM 5000 UNITS: 5000 INJECTION INTRAVENOUS; SUBCUTANEOUS at 09:26

## 2019-01-18 RX ADMIN — CEFTRIAXONE 1 G: 1 INJECTION, POWDER, FOR SOLUTION INTRAMUSCULAR; INTRAVENOUS at 18:04

## 2019-01-18 RX ADMIN — METOPROLOL SUCCINATE 100 MG: 50 TABLET, EXTENDED RELEASE ORAL at 09:26

## 2019-01-18 RX ADMIN — HYDRALAZINE HYDROCHLORIDE 25 MG: 25 TABLET, FILM COATED ORAL at 09:32

## 2019-01-18 RX ADMIN — SODIUM CHLORIDE, SODIUM LACTATE, POTASSIUM CHLORIDE, AND CALCIUM CHLORIDE 75 ML/HR: 600; 310; 30; 20 INJECTION, SOLUTION INTRAVENOUS at 00:07

## 2019-01-18 RX ADMIN — Medication 10 ML: at 07:01

## 2019-01-18 RX ADMIN — Medication 10 ML: at 13:33

## 2019-01-18 RX ADMIN — AZITHROMYCIN MONOHYDRATE 500 MG: 500 INJECTION, POWDER, LYOPHILIZED, FOR SOLUTION INTRAVENOUS at 18:05

## 2019-01-18 RX ADMIN — ASPIRIN 325 MG: 325 TABLET ORAL at 09:26

## 2019-01-18 RX ADMIN — AMIODARONE HYDROCHLORIDE 200 MG: 200 TABLET ORAL at 18:04

## 2019-01-18 NOTE — PROGRESS NOTES
Problem: Dysphagia (Adult) Goal: *Acute Goals and Plan of Care (Insert Text) Speech Therapy Goals Initiated 1/18/2019 1. Patient will tolerate mechanical soft diet with thin liquids without signs/symptoms of aspiration within 7 day(s). Speech LAnguage Pathology bedside swallow evaluation Patient: Hallie Pedroza (62 y.o. female) Date: 1/18/2019 Primary Diagnosis: Generalized weakness Precautions:   Contact, Fall ASSESSMENT : 
Based on the objective data described below, the patient presents with mild-moderate oral and suspected mild pharyngeal dysphagia characterized by incomplete mastication of solids, oral holding of all consistencies and suspected pharyngeal swallow delay. Patient tolerated all PO without overt s/s aspiration. Aspiration risk is elevated given dysphagia and impaired mentation. Given bedside presentation feel mechanical soft diet is appropriate. Patient will benefit from skilled intervention to address the above impairments. Patients rehabilitation potential is considered to be Fair Factors which may influence rehabilitation potential include:  
[]            None noted [x]            Mental ability/status []            Medical condition []            Home/family situation and support systems 
[]            Safety awareness 
[]            Pain tolerance/management []            Other: PLAN : 
Recommendations and Planned Interventions: 
Mechanical soft diet Thin liquids Try medication one at a time, whole with water Sit up for all PO Small bites Single sips Frequency/Duration: Patient will be followed by speech-language pathology 2 times a week to address goals. Discharge Recommendations: To Be Determined SUBJECTIVE:  
Patient stated I don't want to eat anything. . Coating noted on tongue. RN reports patient with oral holding of medication crushed in puree with eventual expectoration. OBJECTIVE:  
 
Past Medical History:  
Diagnosis Date  A-fib ([facility] 10/31/2014  Allergic rhinitis 1/13/2010  Chronic kidney disease  CLL (chronic lymphocytic leukemia) ([facility].)  HTN (hypertension) 1/13/2010  Hypercholesterolemia History reviewed. No pertinent surgical history. Prior Level of Function/Home Situation:  
Home Situation Home Environment: Apartment # Steps to Enter: 1 One/Two Story Residence: One story Living Alone: No 
Support Systems: Family member(s) Patient Expects to be Discharged to[de-identified] [facility] Current DME Used/Available at Home: None(Niece) Diet prior to admission:  
Current Diet:  Regular Cognitive and Communication Status: 
Neurologic State: Alert Orientation Level: Oriented to person Cognition: Decreased attention/concentration, Decreased command following Perception: Cues to maintain midline in sitting, Tactile, Verbal 
Perseveration: No perseveration noted Safety/Judgement: Decreased awareness of environment, Decreased insight into deficits Oral Assessment: 
Oral Assessment Labial: No impairment Dentition: Upper dentures; Natural 
Oral Hygiene: Moist oral mucosa, coating on tongue Lingual: Decreased rate Velum: Unable to visualize Mandible: No impairment P.O. Trials: 
Patient Position: Upright in bed Vocal quality prior to P.O.: No impairment Consistency Presented: Ice chips; Thin liquid;Puree; Solid How Presented: SLP-fed/presented;Self-fed/presented;Spoon;Straw;Successive swallows Bolus Acceptance: No impairment Bolus Formation/Control: Impaired Type of Impairment: Delayed; Incomplete;Mastication Propulsion: Delayed (# of seconds)(Oral holding) Oral Residue: None Initiation of Swallow: Delayed (# of seconds) Laryngeal Elevation: Functional 
Aspiration Signs/Symptoms: None Oral Phase Severity: Mild-moderate Pharyngeal Phase Severity : Mild NOMS:  
The NOMS functional outcome measure was used to quantify this patient's level of swallowing impairment. Based on the NOMS, the patient was determined to be at level 5 for swallow function NOMS Swallowing Levels: 
Level 1 (CN): NPO Level 2 (CM): NPO but takes consistency in therapy Level 3 (CL): Takes less than 50% of nutrition p.o. and continues with nonoral feedings; and/or safe with mod cues; and/or max diet restriction Level 4 (CK): Safe swallow but needs mod cues; and/or mod diet restriction; and/or still requires some nonoral feeding/supplements Level 5 (CJ): Safe swallow with min diet restriction; and/or needs min cues Level 6 (CI): Independent with p.o.; rare cues; usually self cues; may need to avoid some foods or needs extra time Level 7 (CH): Independent for all p.o. OSMIN. (2003). National Outcomes Measurement System (NOMS): Adult Speech-Language Pathology User's Guide. Pain: 
Pain Scale 1: Numeric (0 - 10) Pain Intensity 1: 0 After treatment:  
[]            Patient left in no apparent distress sitting up in chair 
[x]            Patient left in no apparent distress in bed 
[x]            Call bell left within reach [x]            Nursing notified 
[]            Caregiver present 
[]            Bed alarm activated COMMUNICATION/EDUCATION:  
The patients plan of care including recommendations, planned interventions, and recommended diet changes were discussed with: Registered Nurse. Patient was educated regarding role of SLP, diet, POC. []            Posted safety precautions in patient's room. [x]            Patient/family have participated as able in goal setting and plan of care. [x]            Patient/family agree to work toward stated goals and plan of care. []            Patient understands intent and goals of therapy, but is neutral about his/her participation. []            Patient is unable to participate in goal setting and plan of care. Thank you for this referral. 
Cabrera Christy, SLP Time Calculation: 15 mins

## 2019-01-18 NOTE — CDMP QUERY
Currently there is documentation of \"Bacterial pneumonia with LLL infiltrate\" on this chert. Could this be further specified as:  
 
=> Suspected Gram-Neg Pneumonia (POA) in immunocompromised patient requiring IV abx/CXR/O2/Chest CT. 
=> Suspected Viral Pneumonia (POA) in immunocompromised patient requiring IV abx/CXR/O2/Chest CT. 
=> Other explanation of clinical findings 
=> Clinically Undetermined (no explanation for clinical findings) The medical record reflects the following clinical findings, treatment, and risk factors. Risk Factors: CLL, weakness Clinical Indicators: .9, Neutrophils 6; Lymphocytes 93 CXR: bandlike opacity left base consistent with subsegmental atelectasis. CT Chest: Left lower lobe infiltrate 
decreased sounds L base and c/o cough Treatment: IV Rocephin and Zithromax; sputum cx and BC ordered; O2; CXR; Chest CT. Please clarify and document your clinical opinion in the progress notes and discharge summary including the definitive and/or presumptive diagnosis, (suspected or probable), related to the above clinical findings. Please include clinical findings supporting your diagnosis. Thank you, Kenzie Blanco RN 
[phone]

## 2019-01-18 NOTE — PROGRESS NOTES
Hospitalist Progress Note Yovani Infante MD 
Answering service: [phone] from in house phone Cell: [phone] Date of Service:  2019 NAME:  Jhon Abraham :  10/20/1932 MRN:  [id number] Admission Summary:  
 
59-year-old woman with a past medical history significant for chronic kidney disease, chronic lymphocytic leukemia, hypertension, dyslipidemia, chronic atrial fibrillation, status post CVA, was in her usual state of health until about 5 days ago when the patient developed generalized weakness, which is progressive and getting worse. The patient also complained of diarrhea, which is profuse. No blood or mucus in the stool. No sick contact. As a result of the generalized weakness, the patient has not been able to carry out activities of daily living. She is in bed most of the time. It was reported that the patient is able to ambulate with a walker, but for the past 5 days, the patient has not been able to do that because of the weakness and the diarrhea. The weakness was described as generalized weakness. No history of a fall at home. No history of fever, rigors or chills. The patient was brought to the emergency room because of worsening weakness. The weakness is associated with poor oral intake and poor appetite. When the patient arrived at the emergency room, her urinalysis was consistent with a urinary tract infection. Chest x-ray shows abnormality which could be suggestive of pneumonia as well. The patient was then referred to the hospitalist service for evaluation for admission. She was last admitted to this hospital in 2017. The patient was admitted and treated for acute CVA. Interval history / Subjective:  
  
F/u Pneumonia Fever low grade  8 pm  
 
Assessment & Plan:  
 
Bacterial pneumonia with LLL infiltrate on Chest CT. 
-suspected gram negative 
-blood culture no growth so far -sputum culture 
-CT chest 1/16 Cardiomegaly. Adenopathy. Left lower lobe infiltrate 
-continue on Rocephin and Zithromax Acute cystitis without hematuria, difficult to tell if this is a source of infection.  
-Patient on abx regardless. Generalized weakness: 
-CT head negative for acute findings 
-likely secondary to PNA 
-PT/OT SNF Hyperkalemia 
-improved Chronic kidney disease stage III 
-stable, monitor. Transaminitis 
-sec to ? 
-Will hold statin for now Chronic lymphocytic leukemia, lost to follow-up. -Awaiting Oncology Hypertension 
-On Hydralazine/Metoprolol 
-Monitor Dementia, supportive therapy. PT/OT rehab likely SNF Code status: Full DVT prophylaxis: heparin PTA: home Plan: Continue antibiotics, follow Oncology Care Plan discussed with: Patient/Family and Nurse Disposition: TBD Hospital Problems  Date Reviewed: 1/16/2019 Codes Class Noted POA * (Principal) Generalized weakness ICD-10-CM: R53.1 ICD-9-CM: 780.79  1/16/2019 Yes Review of Systems:  
Review of systems not obtained due to patient factors. Vital Signs:  
 Last 24hrs VS reviewed since prior progress note. Most recent are: 
Visit Vitals /81 (BP 1 Location: Right arm, BP Patient Position: At rest) Pulse 63 Temp 98.7 °F (37.1 °C) Resp 16 Wt 49.4 kg (109 lb) SpO2 98% Breastfeeding? No  
BMI 18.14 kg/m² Intake/Output Summary (Last 24 hours) at 1/18/2019 1144 Last data filed at 1/17/2019 2145 Gross per 24 hour Intake 2425 ml Output  Net 2425 ml Physical Examination:  
 
  
Constitutional:  No acute distress, cooperative, pleasant   
ENT:  Neck supple Resp:  Slightly decreased sounds L base. No accessory muscle use CV:  Regular rhythm, normal rate GI:  Soft, non distended, non tender, normoactive bowel sounds Musculoskeletal:  No edema, warm Neurologic:  Moves all extremities. Alert to self Data Review: Review and/or order of clinical lab test 
Review and/or order of tests in the radiology section of CPT Review and/or order of tests in the medicine section of CPT Labs:  
 
Recent Labs  
  01/18/19 
0049 01/17/19 
0135 WBC 94.9* 104.4* HGB 10.0* 10.3* HCT 31.9* 31.9*  
 177 Recent Labs  
  01/18/19 
0515 01/18/19 
0367 01/18/19 
0049 01/17/19 0135 01/16/19 
1658 NA  --  138 138 139  --   
K 5.2* 6.1* 6.0* 5.2*  --   
CL  --  108 107 106  --   
CO2  --  23 24 26  --   
BUN  --  19 20 25*  --   
CREA  --  1.14* 1.18* 1.30*  --   
GLU  --  73 76 84  --   
CA  --  7.0* 8.0* 7.6*  --   
MG  --   --   --   --  2.1 PHOS  --   --   --   --  2.6 Recent Labs  
  01/17/19 
0135 01/16/19 
1658 01/16/19 
1140 SGOT 206*  --  254* *  --  196* AP 80  --  92  
TBILI 0.6  --  0.6 TP 5.5*  --  6.0* ALB 2.3*  --  2.4*  
GLOB 3.2  --  3.6 AML  --  46  --   
LPSE  --  151  -- No results for input(s): INR, PTP, APTT in the last 72 hours. No lab exists for component: INREXT, INREXT No results for input(s): FE, TIBC, PSAT, FERR in the last 72 hours. No results found for: FOL, RBCF No results for input(s): PH, PCO2, PO2 in the last 72 hours. No results for input(s): CPK, CKNDX, TROIQ in the last 72 hours. No lab exists for component: CPKMB Lab Results Component Value Date/Time Cholesterol, total 145 07/01/2017 02:48 AM  
 HDL Cholesterol 60 07/01/2017 02:48 AM  
 LDL, calculated 72.8 07/01/2017 02:48 AM  
 Triglyceride 61 07/01/2017 02:48 AM  
 CHOL/HDL Ratio 2.4 07/01/2017 02:48 AM  
 
Lab Results Component Value Date/Time Glucose (POC) 102 (H) 01/17/2019 10:10 AM  
 Glucose (POC) 114 (H) 07/08/2017 09:43 PM  
 Glucose (POC) 92 06/30/2017 06:49 AM  
 Glucose (POC) 90 11/02/2014 12:20 PM  
 
Lab Results Component Value Date/Time  Color DARK YELLOW 01/16/2019 11:40 AM  
 Appearance CLOUDY (A) 01/16/2019 11:40 AM  
 Specific gravity 1.020 01/16/2019 11:40 AM  
 pH (UA) 6.0 01/16/2019 11:40 AM  
 Protein TRACE (A) 01/16/2019 11:40 AM  
 Glucose NEGATIVE  01/16/2019 11:40 AM  
 Ketone NEGATIVE  01/16/2019 11:40 AM  
 Bilirubin NEGATIVE  01/16/2019 11:40 AM  
 Urobilinogen 1.0 01/16/2019 11:40 AM  
 Nitrites NEGATIVE  01/16/2019 11:40 AM  
 Leukocyte Esterase MODERATE (A) 01/16/2019 11:40 AM  
 Epithelial cells FEW 01/16/2019 11:40 AM  
 Bacteria 1+ (A) 01/16/2019 11:40 AM  
 WBC 5-10 01/16/2019 11:40 AM  
 RBC 0-5 01/16/2019 11:40 AM  
 
 
 
Medications Reviewed:  
 
Current Facility-Administered Medications Medication Dose Route Frequency  lactated Ringers infusion  75 mL/hr IntraVENous CONTINUOUS  
 amiodarone (CORDARONE) tablet 200 mg  200 mg Oral BID  aspirin tablet 325 mg  325 mg Oral DAILY  atorvastatin (LIPITOR) tablet 40 mg  40 mg Oral QHS  hydrALAZINE (APRESOLINE) tablet 25 mg  25 mg Oral TID WITH MEALS  memantine (NAMENDA) tablet 5 mg  5 mg Oral DAILY  metoprolol succinate (TOPROL-XL) XL tablet 100 mg  100 mg Oral DAILY  sodium chloride (NS) flush 5-40 mL  5-40 mL IntraVENous Q8H  
 sodium chloride (NS) flush 5-40 mL  5-40 mL IntraVENous PRN  
 heparin (porcine) injection 5,000 Units  5,000 Units SubCUTAneous Q8H  
 lactobac ac& pc-s.therm-b.anim (JEREMIAH Q/RISAQUAD)  1 Cap Oral DAILY  cefTRIAXone (ROCEPHIN) 1 g in 0.9% sodium chloride (MBP/ADV) 50 mL  1 g IntraVENous Q24H  
 azithromycin (ZITHROMAX) 500 mg in 0.9% sodium chloride 250 mL ([id number])  500 mg IntraVENous Q24H  
 
______________________________________________________________________ EXPECTED LENGTH OF STAY: 3d 7h 
ACTUAL LENGTH OF STAY:          2 Betina Gurrola MD

## 2019-01-18 NOTE — PROGRESS NOTES
1570 - paged hospitalist for patient potassium 6.0. 
 
 
0222 - called by pharmacy questioning kayexalate order; patient specimen was hemolyzed. Per norris, cancel order for kayexalate, as well as EKG, redraw patient lab.

## 2019-01-18 NOTE — PROGRESS NOTES
Reason for Admission:   Generalized Weakness RRAT Score:     29 Resources/supports as identified by patient/family: Patient lives with her niece, who is retired and acts as patient's primary 24/7 caretaker. Patient has 24/7 care and support through family. Patient is followed by Dr. Shaka Rapp, oncology, and Dr. Karina Loya, cardio. Patient is a [facility] patient, followed primarily at the [facility] and/or [address] locations by Dr. Niyah Burt. Top Challenges facing patient (as identified by patient/family and CM): Patient's granddaughter identified that patient has had a hx of dementia type symptoms for 6-7 years, with a marked increase of symptoms within last 1.5 years. Per granddaughter, patient was very suspicious of maintenance crews in 2 assisted communities and has not been able to recall the granddaughter, resulting in her transition to living with the patient's niece. Finances/Medication cost?    Patient has [address] Medicare HMO; patient uses [facility] at [facility]. Concern about LTC potential; CM to submit patient's information to [employer] via email for Medicaid financial screening Transportation? Family transports patient Support system or lack thereof? Extensive family support, including 24/7 care Living arrangements? With niece and niece's spouse in 2 story home; 2nd floor bedroom Self-care/ADLs/Cognition? Was primarily independent in ADLs until Saturday, 1/12. Current Advanced Directive/Advance Care Plan:  Attending physician discussed code status, and granddaughter expressed that patient has discussed wishes with family. Patient would not want life prolonging treatments, compressions, or shocks. Attending to update patient's code status to DNR. DDNR form is on patient's hard chart. Plan for utilizing home health:    SNF placement is current recommendation. Likelihood of readmission: HIGH Transition of Care Plan:    CM rounded on patient with attending physician. Patient was febrile yesterday afternoon, 1/17. Patient is suspected to have PNA, and IV ABX plan to continue. Patient did not participate in assessment process, but patient was awake with granddaughter Jacqueline Morse) at bedside. Patient is cared for by family 24/7, primarily by her retired niece, with whom the patient lives. Granddaughter stated that patient has been dementia symptomatic for 6-7 years, with a marked decline within the last 1.5 years. On Saturday, the patient stopped eating, drinking, walking, or talking with increased confusion. Patient will have a SLP for swallow eval and possible modified swallow study. CM to submit patient's profile to [employer] via email for Medicaid LTC financial screening. CM discussed current SNF recommendation and provided patient's granddaughter with SNF listing. Granddaughter expressed understanding of need for 3 facility selections within a 24 hour period and will notify weekend CM team of selections tomorrow, 1/19/19. Care Management Interventions PCP Verified by CM: Yes(Patient is followed by Dr. Sydney Zapien ([facility] or [facility]); last seen 3 months ago) Palliative Care Criteria Met (RRAT>21 & CHF Dx)?: No 
Mode of Transport at Discharge: BLS(Will require BLS stretcher transport) Transition of Care Consult (CM Consult): Discharge Planning, SNF(Granddaughter was given SNF listing) MyChart Signup: No 
Discharge Durable Medical Equipment: No(Has DME of: cane, walker, bedside commode, shower stool, BP Cuff, and full dentures) Health Maintenance Reviewed: Yes(CM met with patient's granddaughter Jacqueline Morse) at bedside, with patient awake but non-participatory ) Physical Therapy Consult: Yes Occupational Therapy Consult:  Yes 
 Speech Therapy Consult: Yes Current Support Network: [place], Family Lives [place], Lives with Caregiver(Lives with niece, who acts as primary 24/7 caregiver) Confirm Follow Up Transport: Family(was primarily independent in ADLs, but family transports) Plan discussed with Pt/Family/Caregiver: Yes(Granddaughter did assessment) Freedom of Choice Offered: Yes(CM provided patient's granddaughter with SNF listing) [employer] Information Provided?: No 
Discharge Location Discharge Placement: Skilled nursing facility(Lives with niece and niece's spouse in a 2 story home, 2nd floor bedroom, 4 exterior steps, 16 interior steps) CRM: Ayaan Brooke, MPH, [address] [place] [address]; Z: [phone]

## 2019-01-18 NOTE — PROGRESS NOTES
Advance Care Planning Note Name: Katina Carrasquillo YOB: 1932 MRN: [id number] Admission Date: 1/16/2019 11:21 AM 
 
Date of discussion: 1/18/2019 Active Diagnoses: 
 
Hospital Problems  Date Reviewed: 1/16/2019 Codes Class Noted POA * (Principal) Generalized weakness ICD-10-CM: R53.1 ICD-9-CM: 780.79  1/16/2019 Yes These active diagnoses are of sufficient risk that focused discussion on advance care planning is indicated in order to allow the patient to thoughtfully consider personal goals of care, and if situations arise that prevent the ability to personally give input, to ensure appropriate representation of their personal desires for different levels and aggressiveness of care. Discussion:  
 
Persons present and participating in discussion: Katina CarrasquilloPrateek MD,[place] (granddaughter) Discussion: Spoke to the patient and grand daughter about the patient's co-morbidities and the events leading to the current hospitalization. The grand daughter agreed on making the patient DNR Time Spent:  
 
Total time spent face-to-face in education and discussion: 17 minutes.   
 
Prateek Florentino MD 
1/18/2019 
4:06 PM

## 2019-01-19 LAB
ALBUMIN SERPL-MCNC: 1.8 G/DL (ref 3.5–5)
ALBUMIN/GLOB SERPL: 0.6 {RATIO} (ref 1.1–2.2)
ALP SERPL-CCNC: 69 U/L (ref 45–117)
ALT SERPL-CCNC: 114 U/L (ref 12–78)
ANION GAP SERPL CALC-SCNC: 5 MMOL/L (ref 5–15)
AST SERPL-CCNC: 148 U/L (ref 15–37)
BASOPHILS # BLD: 0 K/UL (ref 0–0.1)
BASOPHILS NFR BLD: 0 % (ref 0–1)
BILIRUB SERPL-MCNC: 0.5 MG/DL (ref 0.2–1)
BUN SERPL-MCNC: 18 MG/DL (ref 6–20)
BUN/CREAT SERPL: 17 (ref 12–20)
CALCIUM SERPL-MCNC: 7.8 MG/DL (ref 8.5–10.1)
CHLORIDE SERPL-SCNC: 110 MMOL/L (ref 97–108)
CO2 SERPL-SCNC: 25 MMOL/L (ref 21–32)
CREAT SERPL-MCNC: 1.03 MG/DL (ref 0.55–1.02)
DIFFERENTIAL METHOD BLD: ABNORMAL
EOSINOPHIL # BLD: 0.9 K/UL (ref 0–0.4)
EOSINOPHIL NFR BLD: 1 % (ref 0–7)
ERYTHROCYTE [DISTWIDTH] IN BLOOD BY AUTOMATED COUNT: 15.7 % (ref 11.5–14.5)
GLOBULIN SER CALC-MCNC: 3.1 G/DL (ref 2–4)
GLUCOSE SERPL-MCNC: 73 MG/DL (ref 65–100)
HCT VFR BLD AUTO: 29.2 % (ref 35–47)
HGB BLD-MCNC: 9.1 G/DL (ref 11.5–16)
IMM GRANULOCYTES # BLD AUTO: 0 K/UL
IMM GRANULOCYTES NFR BLD AUTO: 0 %
LYMPHOCYTES # BLD: 84.8 K/UL (ref 0.8–3.5)
LYMPHOCYTES NFR BLD: 91 % (ref 12–49)
MCH RBC QN AUTO: 29.5 PG (ref 26–34)
MCHC RBC AUTO-ENTMCNC: 31.2 G/DL (ref 30–36.5)
MCV RBC AUTO: 94.8 FL (ref 80–99)
MONOCYTES # BLD: 1.9 K/UL (ref 0–1)
MONOCYTES NFR BLD: 2 % (ref 5–13)
NEUTS SEG # BLD: 5.6 K/UL (ref 1.8–8)
NEUTS SEG NFR BLD: 6 % (ref 32–75)
NRBC # BLD: 0 K/UL (ref 0–0.01)
NRBC BLD-RTO: 0 PER 100 WBC
PLATELET # BLD AUTO: 179 K/UL (ref 150–400)
PLATELET COMMENTS,PCOM: ABNORMAL
PMV BLD AUTO: 12 FL (ref 8.9–12.9)
POTASSIUM SERPL-SCNC: 5 MMOL/L (ref 3.5–5.1)
PROT SERPL-MCNC: 4.9 G/DL (ref 6.4–8.2)
RBC # BLD AUTO: 3.08 M/UL (ref 3.8–5.2)
RBC MORPH BLD: ABNORMAL
RBC MORPH BLD: ABNORMAL
SODIUM SERPL-SCNC: 140 MMOL/L (ref 136–145)
WBC # BLD AUTO: 93.2 K/UL (ref 3.6–11)
WBC MORPH BLD: ABNORMAL

## 2019-01-19 PROCEDURE — 74011250636 HC RX REV CODE- 250/636: Performed by: INTERNAL MEDICINE

## 2019-01-19 PROCEDURE — 85025 COMPLETE CBC W/AUTO DIFF WBC: CPT

## 2019-01-19 PROCEDURE — 74011250637 HC RX REV CODE- 250/637: Performed by: INTERNAL MEDICINE

## 2019-01-19 PROCEDURE — 36415 COLL VENOUS BLD VENIPUNCTURE: CPT

## 2019-01-19 PROCEDURE — 80053 COMPREHEN METABOLIC PANEL: CPT

## 2019-01-19 PROCEDURE — 65270000029 HC RM PRIVATE

## 2019-01-19 PROCEDURE — 74011000258 HC RX REV CODE- 258: Performed by: INTERNAL MEDICINE

## 2019-01-19 RX ADMIN — Medication 10 ML: at 22:27

## 2019-01-19 RX ADMIN — AMIODARONE HYDROCHLORIDE 200 MG: 200 TABLET ORAL at 09:45

## 2019-01-19 RX ADMIN — AZITHROMYCIN MONOHYDRATE 500 MG: 500 INJECTION, POWDER, LYOPHILIZED, FOR SOLUTION INTRAVENOUS at 17:10

## 2019-01-19 RX ADMIN — ASPIRIN 325 MG: 325 TABLET ORAL at 09:45

## 2019-01-19 RX ADMIN — HEPARIN SODIUM 5000 UNITS: 5000 INJECTION INTRAVENOUS; SUBCUTANEOUS at 09:45

## 2019-01-19 RX ADMIN — Medication 10 ML: at 02:20

## 2019-01-19 RX ADMIN — HYDRALAZINE HYDROCHLORIDE 25 MG: 25 TABLET, FILM COATED ORAL at 17:11

## 2019-01-19 RX ADMIN — AMIODARONE HYDROCHLORIDE 200 MG: 200 TABLET ORAL at 17:11

## 2019-01-19 RX ADMIN — CEFTRIAXONE 1 G: 1 INJECTION, POWDER, FOR SOLUTION INTRAMUSCULAR; INTRAVENOUS at 17:14

## 2019-01-19 RX ADMIN — SODIUM CHLORIDE, SODIUM LACTATE, POTASSIUM CHLORIDE, AND CALCIUM CHLORIDE 75 ML/HR: 600; 310; 30; 20 INJECTION, SOLUTION INTRAVENOUS at 12:30

## 2019-01-19 RX ADMIN — Medication 1 CAPSULE: at 09:45

## 2019-01-19 RX ADMIN — Medication 10 ML: at 13:39

## 2019-01-19 RX ADMIN — HEPARIN SODIUM 5000 UNITS: 5000 INJECTION INTRAVENOUS; SUBCUTANEOUS at 00:49

## 2019-01-19 RX ADMIN — MEMANTINE 5 MG: 5 TABLET ORAL at 09:51

## 2019-01-19 RX ADMIN — METOPROLOL SUCCINATE 100 MG: 50 TABLET, EXTENDED RELEASE ORAL at 09:44

## 2019-01-19 NOTE — CONSULTS
Hematology Oncology Consultation    Reason for consult: Larry Mccarthy is a  80y.o. year old seen in consultation at the request of Stephanie Whitley for CLL. HPI: Larry Mccarthy is a 80 y.o.  female who is being seen for CLL. .PT has been followed in my office since 2012 for Stage I/II disease, She was asymptomatic with moderate quality of life except for dementia. Initially her Hb was 11.0 Gm ( appropriate for  her then age). No intervention was recommended. Her last visit to our office was on 8/2/16 . Her Hb was 10.8, WBC 87.4K and platelets at 078N. Again no fever ,chills ,night sweats  Or wt loss. Her dementis was worse. She was lost to follow up then on. Now admitted with pneumonia    History:  Past Medical History:   Diagnosis Date    A-fib ([facility] [address] 10/31/2014    Allergic rhinitis 1/13/2010    Chronic kidney disease     [facility]     HTN (hypertension) 1/13/2010    Hypercholesterolemia       History reviewed. No pertinent surgical history. Prior to Admission medications    Medication Sig Start Date End Date Taking? Authorizing Provider   atorvastatin (LIPITOR) 40 mg tablet Take 40 mg by mouth nightly. Yes Provider, Historical   furosemide (LASIX) 40 mg tablet Take 40 mg by mouth daily. Yes Provider, Historical   aspirin (ASPIRIN) 325 mg tablet Take 325 mg by mouth daily. Yes Provider, Historical   metoprolol succinate (TOPROL-XL) 100 mg tablet Take 100 mg by mouth daily. Yes Provider, Historical   potassium chloride SR (KLOR-CON 10) 10 mEq tablet Take 10 mEq by mouth daily. Yes Provider, Historical   amiodarone (CORDARONE) 200 mg tablet Take 200 mg by mouth two (2) times a day. Yes Provider, Historical   hydrALAZINE (APRESOLINE) 25 mg tablet Take 25 mg by mouth three (3) times daily (with meals). Yes Provider, Historical   memantine (NAMENDA) 5 mg tablet Take 5 mg by mouth daily.    Yes Provider, Historical     Allergies   Allergen Reactions  Ace Inhibitors Cough    Amlodipine Swelling    Atenolol Nausea Only    Chlorthalidone Other (comments)     Urinary incontinence    Felodipine Swelling    Hyzaar [Losartan-Hydrochlorothiazide] Other (comments)     headache      Social History     Tobacco Use    Smoking status: Former Smoker     Packs/day: 0.25     Years: 50.00     Pack years: 12.50     Types: Cigarettes     Last attempt to quit: 2010     Years since quittin.8    Smokeless tobacco: Never Used   Substance Use Topics    Alcohol use: No     Alcohol/week: 1.0 - 2.0 oz     Types: 2 - 4 Standard drinks or equivalent per week     Comment: recently quit Friday gin      History reviewed. No pertinent family history. Review of Systems:  Constitutional No fevers, chills, night sweats, excessive fatigue or weight loss. Allergic/Immunologic No recent allergic reactions   Eyes No significant visual difficulties. No diplopia. ENMT Slight hard of hearing, no sore throat, no sinus drainage. Endocrine No hot flashes or night sweats. No cold intolerance, polyuria, or polydipsia   Hematologic/Lymphatic No easy bruising or bleeding. The patient denies any tender or palpable lymph nodes   Breasts No abnormal masses of breast, nipple discharge or pain. Respiratory No dyspnea on exertion, orthopnea, chest pain, cough or hemoptysis. Cardiovascular No anginal chest pain, irregular heart beat, tachycardia, palpitations or orthopnea. Gastrointestinal No nausea, vomiting, diarrhea, constipation, cramping, dysphagia, reflux, heartburn, GI bleeding, or early satiety. No change in bowel habits. Genitourinary (M) No hematuria, dysuria, increased frequency, urgency, hesitancy or incontinence. Musculoskeletal No joint pain, swelling or redness. No decreased range of motion. Integumentary No chronic rashes, inflammation, ulcerations, pruritus, petechiae, purpura, ecchymoses, or skin changes.    Neurologic No headache, blurred vision, and no areas of focal weakness or numbness. Normal gait. No sensory problems. Psychiatric No insomnia, depression, coco or mood swings. No psychotropic drugs. Labs:  CBC: 1/16/2019: HCT 37.2 % (Ref range: 35.0 - 47.0 %); HGB 11.3 g/dL* (Ref range: 11.5 - 16.0 g/dL); PLATELET 747 K/uL (Ref range: 150 - 400 K/uL); RBC 3.81 M/uL (Ref range: 3.80 - 5.20 M/uL); .9 K/uL* (Ref range: 3.6 - 11.0 K/uL)  1/17/2019: HCT 31.9 %* (Ref range: 35.0 - 47.0 %); HGB 10.3 g/dL* (Ref range: 11.5 - 16.0 g/dL); PLATELET 923 K/uL (Ref range: 150 - 400 K/uL); RBC 3.40 M/uL* (Ref range: 3.80 - 5.20 M/uL); .4 K/uL* (Ref range: 3.6 - 11.0 K/uL)   BMP: 1/16/2019: BUN 25 MG/DL* (Ref range: 6 - 20 MG/DL); Calcium 8.0 MG/DL* (Ref range: 8.5 - 10.1 MG/DL); Chloride 104 mmol/L (Ref range: 97 - 108 mmol/L); CO2 25 mmol/L (Ref range: 21 - 32 mmol/L); Creatinine 1.64 MG/DL* (Ref range: 0.55 - 1.02 MG/DL); Glucose 86 mg/dL (Ref range: 65 - 100 mg/dL); Potassium 5.0 mmol/L (Ref range: 3.5 - 5.1 mmol/L); Sodium 139 mmol/L (Ref range: 136 - 145 mmol/L)  1/17/2019: BUN 25 MG/DL* (Ref range: 6 - 20 MG/DL); Calcium 7.6 MG/DL* (Ref range: 8.5 - 10.1 MG/DL); Chloride 106 mmol/L (Ref range: 97 - 108 mmol/L); CO2 26 mmol/L (Ref range: 21 - 32 mmol/L); Creatinine 1.30 MG/DL* (Ref range: 0.55 - 1.02 MG/DL); Glucose 84 mg/dL (Ref range: 65 - 100 mg/dL); Potassium 5.2 mmol/L* (Ref range: 3.5 - 5.1 mmol/L); Sodium 139 mmol/L (Ref range: 136 - 145 mmol/L)  Coagulation: No results found for requested labs within first 48 hours of the last admission day. Cardiac markers: No results found for requested labs within first 48 hours of the last admission day. ABG: No results found for requested labs within first 48 hours of the last admission day. Liver: 1/16/2019: Albumin 2.4 g/dL* (Ref range: 3.5 - 5.0 g/dL); Alk. phosphatase 92 U/L (Ref range: 45 - 117 U/L); Amylase 46 U/L (Ref range: 25 - 115 U/L); AST (SGOT) 254 U/L* (Ref range: 15 - 37 U/L);  Lipase 151 U/L (Ref range: 73 - 393 U/L); Protein, total 6.0 g/dL* (Ref range: 6.4 - 8.2 g/dL)  1/17/2019: Albumin 2.3 g/dL* (Ref range: 3.5 - 5.0 g/dL); Alk. phosphatase 80 U/L (Ref range: 45 - 117 U/L); AST (SGOT) 206 U/L* (Ref range: 15 - 37 U/L); Protein, total 5.5 g/dL* (Ref range: 6.4 - 8.2 g/dL)    Recent Results (from the past 24 hour(s))   CULTURE, BLOOD, PAIRED    Collection Time: 01/17/19  8:29 PM   Result Value Ref Range    Special Requests: NO SPECIAL REQUESTS      Culture result: NO GROWTH AFTER 7 HOURS     CBC WITH AUTOMATED DIFF    Collection Time: 01/18/19 12:49 AM   Result Value Ref Range    WBC 94.9 (HH) 3.6 - 11.0 K/uL    RBC 3.36 (L) 3.80 - 5.20 M/uL    HGB 10.0 (L) 11.5 - 16.0 g/dL    HCT 31.9 (L) 35.0 - 47.0 %    MCV 94.9 80.0 - 99.0 FL    MCH 29.8 26.0 - 34.0 PG    MCHC 31.3 30.0 - 36.5 g/dL    RDW 15.6 (H) 11.5 - 14.5 %    PLATELET 123 446 - 927 K/uL    MPV 12.0 8.9 - 12.9 FL    NRBC 0.0 0  WBC    ABSOLUTE NRBC 0.00 0.00 - 0.01 K/uL    NEUTROPHILS 5 (L) 32 - 75 %    LYMPHOCYTES 95 (H) 12 - 49 %    MONOCYTES 0 (L) 5 - 13 %    EOSINOPHILS 0 0 - 7 %    BASOPHILS 0 0 - 1 %    IMMATURE GRANULOCYTES 0 %    ABS. NEUTROPHILS 4.7 1.8 - 8.0 K/UL    ABS. LYMPHOCYTES 90.2 (H) 0.8 - 3.5 K/UL    ABS. MONOCYTES 0.0 0.0 - 1.0 K/UL    ABS. EOSINOPHILS 0.0 0.0 - 0.4 K/UL    ABS. BASOPHILS 0.0 0.0 - 0.1 K/UL    ABS. IMM.  GRANS. 0.0 K/UL    DF MANUAL      PLATELET COMMENTS Large Platelets      RBC COMMENTS ANISOCYTOSIS  1+        RBC COMMENTS OVALOCYTES  PRESENT        RBC COMMENTS SCHISTOCYTES  PRESENT        WBC COMMENTS Differential performed on albumin smear     METABOLIC PANEL, BASIC    Collection Time: 01/18/19 12:49 AM   Result Value Ref Range    Sodium 138 136 - 145 mmol/L    Potassium 6.0 (H) 3.5 - 5.1 mmol/L    Chloride 107 97 - 108 mmol/L    CO2 24 21 - 32 mmol/L    Anion gap 7 5 - 15 mmol/L    Glucose 76 65 - 100 mg/dL    BUN 20 6 - 20 MG/DL    Creatinine 1.18 (H) 0.55 - 1.02 MG/DL    BUN/Creatinine ratio 17 12 - 20      GFR est AA 53 (L) >60 ml/min/1.73m2    GFR est non-AA 43 (L) >60 ml/min/1.73m2    Calcium 8.0 (L) 8.5 - 14.8 MG/DL   METABOLIC PANEL, BASIC    Collection Time: 01/18/19  3:36 AM   Result Value Ref Range    Sodium 138 136 - 145 mmol/L    Potassium 6.1 (H) 3.5 - 5.1 mmol/L    Chloride 108 97 - 108 mmol/L    CO2 23 21 - 32 mmol/L    Anion gap 7 5 - 15 mmol/L    Glucose 73 65 - 100 mg/dL    BUN 19 6 - 20 MG/DL    Creatinine 1.14 (H) 0.55 - 1.02 MG/DL    BUN/Creatinine ratio 17 12 - 20      GFR est AA 55 (L) >60 ml/min/1.73m2    GFR est non-AA 45 (L) >60 ml/min/1.73m2    Calcium 7.0 (L) 8.5 - 10.1 MG/DL   POTASSIUM    Collection Time: 01/18/19  5:15 AM   Result Value Ref Range    Potassium 5.2 (H) 3.5 - 5.1 mmol/L         Current Medications:  Current Facility-Administered Medications   Medication Dose Route Frequency    lactated Ringers infusion  75 mL/hr IntraVENous CONTINUOUS    amiodarone (CORDARONE) tablet 200 mg  200 mg Oral BID    aspirin tablet 325 mg  325 mg Oral DAILY    hydrALAZINE (APRESOLINE) tablet 25 mg  25 mg Oral TID WITH MEALS    memantine (NAMENDA) tablet 5 mg  5 mg Oral DAILY    metoprolol succinate (TOPROL-XL) XL tablet 100 mg  100 mg Oral DAILY    sodium chloride (NS) flush 5-40 mL  5-40 mL IntraVENous Q8H    sodium chloride (NS) flush 5-40 mL  5-40 mL IntraVENous PRN    heparin (porcine) injection 5,000 Units  5,000 Units SubCUTAneous Q8H    lactobac ac& pc-s.therm-b.anim (JEREMIAH Q/RISAQUAD)  1 Cap Oral DAILY    cefTRIAXone (ROCEPHIN) 1 g in 0.9% sodium chloride (MBP/ADV) 50 mL  1 g IntraVENous Q24H    azithromycin (ZITHROMAX) 500 mg in 0.9% sodium chloride 250 mL ([id number])  500 mg IntraVENous Q24H       Imaging:  CT Chest. LL lobe infiltrate. Normal.  Images:  normal      Physical Exam:  Constitutional Alert, cooperative, oriented. Mood and affect appropriate. Appears close to chronological age. Well nourished. Well developed.    Head Normocephalic; no scars   Eyes Conjunctivae and sclerae are clear and without icterus. Pupils are reactive and equal.   ENMT Sinuses are nontender. No oral exudates, ulcers, masses, thrush or mucositis. Oropharynx clear. Tongue normal.   Neck Supple without masses or thyromegaly. No jugular venous distension. Hematologic/Lymphatic No petechiae or purpura. No tender or palpable lymph nodes in the cervical, supraclavicular, axillary or inguinal area. Respiratory Lungs are clear to auscultation without rhonchi or wheezing. Cardiovascular Regular rate and rhythm of heart without murmurs, gallops or rubs. Chest / Line Site Chest is symmetric with no chest wall deformities. Abdomen Non-tender, non-distended, no masses, ascites or hepatosplenomegaly. Good bowel sounds. No guarding or rebound tenderness. No pulsatile masses. Musculoskeletal No tenderness or swelling, normal range of motion without obvious weakness. Extremities No visible deformities, no cyanosis, clubbing or edema. Skin No rashes, scars, or lesions suggestive of malignancy. No petechiae, purpura, or ecchymoses. No excoriations. Neurologic No sensory or motor deficits, normal cerebellar function, normal gait, cranial nerves intact. Psychiatric Alert and oriented times three. Coherent speech. Verbalizes understanding of our discussions today. Assessment:  Mrs. Stanley Burleson has a rather indolent CLL, slow growing, still in clinical stage I/II, still NOT in remission ,still clinically asymptomatic from CLL point of view. . Her CBC is by far unchanged, except for increase in her total WBC on the basis of CLL cells. No therapeutic intervention is recomened at this time  for her CLL, which is worsening. Do recommend referring her back to us upon discharge for follow up    Plan:  No intervention for CLL  Refer her back to us upon discharge for follow up  Check Reticulocyte count, and LDH    Ángel Aguilar MD  01/18/19

## 2019-01-19 NOTE — PROGRESS NOTES
Hospitalist Progress Note J Luis Aguirre MD 
Answering service: [phone] from in house phone Cell: [phone] Date of Service:  2019 NAME:  Mehdi Khan :  10/20/1932 MRN:  [id number] Admission Summary:  
 
70-year-old woman with a past medical history significant for chronic kidney disease, chronic lymphocytic leukemia, hypertension, dyslipidemia, chronic atrial fibrillation, status post CVA, was in her usual state of health until about 5 days ago when the patient developed generalized weakness, which is progressive and getting worse. The patient also complained of diarrhea, which is profuse. No blood or mucus in the stool. No sick contact. As a result of the generalized weakness, the patient has not been able to carry out activities of daily living. She is in bed most of the time. It was reported that the patient is able to ambulate with a walker, but for the past 5 days, the patient has not been able to do that because of the weakness and the diarrhea. The weakness was described as generalized weakness. No history of a fall at home. No history of fever, rigors or chills. The patient was brought to the emergency room because of worsening weakness. The weakness is associated with poor oral intake and poor appetite. When the patient arrived at the emergency room, her urinalysis was consistent with a urinary tract infection. Chest x-ray shows abnormality which could be suggestive of pneumonia as well. The patient was then referred to the hospitalist service for evaluation for admission. She was last admitted to this hospital in 2017. The patient was admitted and treated for acute CVA. Interval history / Subjective:  
  
F/u Pneumonia Last fever low grade  8 pm  
 
Assessment & Plan:  
 
Bacterial pneumonia with LLL infiltrate on Chest CT. 
-suspected gram negative -blood culture no growth so far 
-sputum culture 
-CT chest 1/16 Cardiomegaly. Adenopathy. Left lower lobe infiltrate 
-continue on Rocephin and Zithromax 
-SLP cleared the patient for mechanical soft Acute cystitis without hematuria, difficult to tell if this is a source of infection.  
-Patient on abx regardless. Generalized weakness: 
-CT head negative for acute findings 
-likely secondary to PNA 
-PT/OT SNF Hyperkalemia 
-improved Chronic kidney disease stage III 
-stable, monitor. Transaminitis 
-sec to ? 
-Will hold statin for now Chronic lymphocytic leukemia, lost to follow-up. -Appreciate Oncology, outpatient follow up at discharge Hypertension 
-On Hydralazine/Metoprolol 
-Monitor Dementia, supportive therapy. PT/OT rehab likely SNF Code status: now the patient is DNR 
DVT prophylaxis: heparin PTA: home Plan: Continue antibiotics, awaiting placement Care Plan discussed with: Patient/Family and Nurse Disposition: TBD Hospital Problems  Date Reviewed: 1/16/2019 Codes Class Noted POA * (Principal) Generalized weakness ICD-10-CM: R53.1 ICD-9-CM: 780.79  1/16/2019 Yes Review of Systems:  
Review of systems not obtained due to patient factors. Vital Signs:  
 Last 24hrs VS reviewed since prior progress note. Most recent are: 
Visit Vitals /76 (BP 1 Location: Right arm, BP Patient Position: At rest) Pulse (!) 53 Temp 98.6 °F (37 °C) Resp 17 Wt 48.8 kg (107 lb 9.4 oz) SpO2 94% Breastfeeding? No  
BMI 17.90 kg/m² Intake/Output Summary (Last 24 hours) at 1/19/2019 1222 Last data filed at 1/19/2019 9845 Gross per 24 hour Intake 25 ml Output  Net 25 ml Physical Examination:  
 
  
Constitutional:  No acute distress, cooperative, pleasant   
ENT:  Neck supple Resp:  Slightly decreased sounds L base. No accessory muscle use CV:  Regular rhythm, normal rate GI:  Soft, non distended, non tender, normoactive bowel sounds Musculoskeletal:  No edema, warm Neurologic:  Moves all extremities. Alert to self Data Review:  
 Review and/or order of clinical lab test 
Review and/or order of tests in the radiology section of CPT Review and/or order of tests in the medicine section of [facility] Labs:  
 
Recent Labs  
  01/19/19 0050 01/18/19 
0049 WBC 93.2* 94.9* HGB 9.1* 10.0* HCT 29.2* 31.9*  
 175 Recent Labs  
  01/19/19 
0050 01/18/19 
0515 01/18/19 
8065 01/18/19 
0049  01/16/19 
1658   --  138 138   < >  --   
K 5.0 5.2* 6.1* 6.0*   < >  --   
*  --  108 107   < >  --   
CO2 25  --  23 24   < >  --   
BUN 18  --  19 20   < >  --   
CREA 1.03*  --  1.14* 1.18*   < >  --   
GLU 73  --  73 76   < >  --   
CA 7.8*  --  7.0* 8.0*   < >  --   
MG  --   --   --   --   --  2.1 PHOS  --   --   --   --   --  2.6  
 < > = values in this interval not displayed. Recent Labs  
  01/19/19 0050 01/17/19 
0135 01/16/19 
1658 SGOT 148* 206*  --   
* 160*  --   
AP 69 80  --   
TBILI 0.5 0.6  --   
TP 4.9* 5.5*  --   
ALB 1.8* 2.3*  --   
GLOB 3.1 3.2  -- AML  --   --  46  
LPSE  --   --  151 No results for input(s): INR, PTP, APTT in the last 72 hours. No lab exists for component: INREXT, INREXT No results for input(s): FE, TIBC, PSAT, FERR in the last 72 hours. No results found for: FOL, RBCF No results for input(s): PH, PCO2, PO2 in the last 72 hours. No results for input(s): CPK, CKNDX, TROIQ in the last 72 hours. No lab exists for component: CPKMB Lab Results Component Value Date/Time Cholesterol, total 145 07/01/2017 02:48 AM  
 HDL Cholesterol 60 07/01/2017 02:48 AM  
 LDL, calculated 72.8 07/01/2017 02:48 AM  
 Triglyceride 61 07/01/2017 02:48 AM  
 CHOL/HDL Ratio 2.4 07/01/2017 02:48 AM  
 
Lab Results Component Value Date/Time  Glucose (POC) 102 (H) 01/17/2019 10:10 AM  
 Glucose (POC) 114 (H) 07/08/2017 09:43 PM  
 Glucose (POC) 92 06/30/2017 06:49 AM  
 Glucose (POC) 90 11/02/2014 12:20 PM  
 
Lab Results Component Value Date/Time Color DARK YELLOW 01/16/2019 11:40 AM  
 Appearance CLOUDY (A) 01/16/2019 11:40 AM  
 Specific gravity 1.020 01/16/2019 11:40 AM  
 pH (UA) 6.0 01/16/2019 11:40 AM  
 Protein TRACE (A) 01/16/2019 11:40 AM  
 Glucose NEGATIVE  01/16/2019 11:40 AM  
 Ketone NEGATIVE  01/16/2019 11:40 AM  
 Bilirubin NEGATIVE  01/16/2019 11:40 AM  
 Urobilinogen 1.0 01/16/2019 11:40 AM  
 Nitrites NEGATIVE  01/16/2019 11:40 AM  
 Leukocyte Esterase MODERATE (A) 01/16/2019 11:40 AM  
 Epithelial cells FEW 01/16/2019 11:40 AM  
 Bacteria 1+ (A) 01/16/2019 11:40 AM  
 WBC 5-10 01/16/2019 11:40 AM  
 RBC 0-5 01/16/2019 11:40 AM  
 
 
 
Medications Reviewed:  
 
Current Facility-Administered Medications Medication Dose Route Frequency  lactated Ringers infusion  75 mL/hr IntraVENous CONTINUOUS  
 amiodarone (CORDARONE) tablet 200 mg  200 mg Oral BID  aspirin tablet 325 mg  325 mg Oral DAILY  hydrALAZINE (APRESOLINE) tablet 25 mg  25 mg Oral TID WITH MEALS  memantine (NAMENDA) tablet 5 mg  5 mg Oral DAILY  metoprolol succinate (TOPROL-XL) XL tablet 100 mg  100 mg Oral DAILY  sodium chloride (NS) flush 5-40 mL  5-40 mL IntraVENous Q8H  
 sodium chloride (NS) flush 5-40 mL  5-40 mL IntraVENous PRN  
 heparin (porcine) injection 5,000 Units  5,000 Units SubCUTAneous Q8H  
 lactobac ac& pc-s.therm-b.anim (JEREMIAH Q/RISAQUAD)  1 Cap Oral DAILY  cefTRIAXone (ROCEPHIN) 1 g in 0.9% sodium chloride (MBP/ADV) 50 mL  1 g IntraVENous Q24H  
 azithromycin (ZITHROMAX) 500 mg in 0.9% sodium chloride 250 mL ([id number])  500 mg IntraVENous Q24H  
 
______________________________________________________________________ EXPECTED LENGTH OF STAY: 3d 7h 
ACTUAL [facility] STAY:          [facility] [place]

## 2019-01-20 LAB
ANION GAP SERPL CALC-SCNC: 10 MMOL/L (ref 5–15)
ANION GAP SERPL CALC-SCNC: 9 MMOL/L (ref 5–15)
BASOPHILS # BLD: 0 K/UL (ref 0–0.1)
BASOPHILS NFR BLD: 0 % (ref 0–1)
BUN SERPL-MCNC: 13 MG/DL (ref 6–20)
BUN SERPL-MCNC: 16 MG/DL (ref 6–20)
BUN/CREAT SERPL: 15 (ref 12–20)
BUN/CREAT SERPL: 19 (ref 12–20)
CALCIUM SERPL-MCNC: 5.9 MG/DL (ref 8.5–10.1)
CALCIUM SERPL-MCNC: 8.1 MG/DL (ref 8.5–10.1)
CHLORIDE SERPL-SCNC: 110 MMOL/L (ref 97–108)
CHLORIDE SERPL-SCNC: 110 MMOL/L (ref 97–108)
CO2 SERPL-SCNC: 21 MMOL/L (ref 21–32)
CO2 SERPL-SCNC: 26 MMOL/L (ref 21–32)
CREAT SERPL-MCNC: 0.84 MG/DL (ref 0.55–1.02)
CREAT SERPL-MCNC: 0.87 MG/DL (ref 0.55–1.02)
DIFFERENTIAL METHOD BLD: ABNORMAL
EOSINOPHIL # BLD: 0 K/UL (ref 0–0.4)
EOSINOPHIL NFR BLD: 0 % (ref 0–7)
ERYTHROCYTE [DISTWIDTH] IN BLOOD BY AUTOMATED COUNT: 15.9 % (ref 11.5–14.5)
GLUCOSE BLD STRIP.AUTO-MCNC: 96 MG/DL (ref 65–100)
GLUCOSE SERPL-MCNC: 62 MG/DL (ref 65–100)
GLUCOSE SERPL-MCNC: 77 MG/DL (ref 65–100)
HCT VFR BLD AUTO: 29.2 % (ref 35–47)
HGB BLD-MCNC: 9.2 G/DL (ref 11.5–16)
IMM GRANULOCYTES # BLD AUTO: 0 K/UL
IMM GRANULOCYTES NFR BLD AUTO: 0 %
LYMPHOCYTES # BLD: 94.1 K/UL (ref 0.8–3.5)
LYMPHOCYTES NFR BLD: 95 % (ref 12–49)
MCH RBC QN AUTO: 30 PG (ref 26–34)
MCHC RBC AUTO-ENTMCNC: 31.5 G/DL (ref 30–36.5)
MCV RBC AUTO: 95.1 FL (ref 80–99)
MONOCYTES # BLD: 0 K/UL (ref 0–1)
MONOCYTES NFR BLD: 0 % (ref 5–13)
NEUTS SEG # BLD: 5 K/UL (ref 1.8–8)
NEUTS SEG NFR BLD: 5 % (ref 32–75)
NRBC # BLD: 0 K/UL (ref 0–0.01)
NRBC BLD-RTO: 0 PER 100 WBC
PLATELET # BLD AUTO: 191 K/UL (ref 150–400)
PLATELET COMMENTS,PCOM: ABNORMAL
PMV BLD AUTO: 11.7 FL (ref 8.9–12.9)
POTASSIUM SERPL-SCNC: 3.2 MMOL/L (ref 3.5–5.1)
POTASSIUM SERPL-SCNC: 6.1 MMOL/L (ref 3.5–5.1)
RBC # BLD AUTO: 3.07 M/UL (ref 3.8–5.2)
RBC MORPH BLD: ABNORMAL
SERVICE CMNT-IMP: NORMAL
SODIUM SERPL-SCNC: 141 MMOL/L (ref 136–145)
SODIUM SERPL-SCNC: 145 MMOL/L (ref 136–145)
WBC # BLD AUTO: 99.1 K/UL (ref 3.6–11)

## 2019-01-20 PROCEDURE — 80048 BASIC METABOLIC PNL TOTAL CA: CPT

## 2019-01-20 PROCEDURE — 65270000029 HC RM PRIVATE

## 2019-01-20 PROCEDURE — 74011000258 HC RX REV CODE- 258: Performed by: INTERNAL MEDICINE

## 2019-01-20 PROCEDURE — 74011250636 HC RX REV CODE- 250/636: Performed by: INTERNAL MEDICINE

## 2019-01-20 PROCEDURE — 82962 GLUCOSE BLOOD TEST: CPT

## 2019-01-20 PROCEDURE — 74011250637 HC RX REV CODE- 250/637: Performed by: INTERNAL MEDICINE

## 2019-01-20 PROCEDURE — 85025 COMPLETE CBC W/AUTO DIFF WBC: CPT

## 2019-01-20 PROCEDURE — 36415 COLL VENOUS BLD VENIPUNCTURE: CPT

## 2019-01-20 RX ORDER — POTASSIUM CHLORIDE 1.5 G/1.77G
20 POWDER, FOR SOLUTION ORAL ONCE
Status: COMPLETED | OUTPATIENT
Start: 2019-01-20 | End: 2019-01-20

## 2019-01-20 RX ADMIN — SODIUM CHLORIDE, SODIUM LACTATE, POTASSIUM CHLORIDE, AND CALCIUM CHLORIDE 75 ML/HR: 600; 310; 30; 20 INJECTION, SOLUTION INTRAVENOUS at 04:52

## 2019-01-20 RX ADMIN — Medication 10 ML: at 21:27

## 2019-01-20 RX ADMIN — HYDRALAZINE HYDROCHLORIDE 25 MG: 25 TABLET, FILM COATED ORAL at 08:33

## 2019-01-20 RX ADMIN — HEPARIN SODIUM 5000 UNITS: 5000 INJECTION INTRAVENOUS; SUBCUTANEOUS at 01:46

## 2019-01-20 RX ADMIN — HEPARIN SODIUM 5000 UNITS: 5000 INJECTION INTRAVENOUS; SUBCUTANEOUS at 08:33

## 2019-01-20 RX ADMIN — AMIODARONE HYDROCHLORIDE 200 MG: 200 TABLET ORAL at 17:00

## 2019-01-20 RX ADMIN — AZITHROMYCIN MONOHYDRATE 500 MG: 500 INJECTION, POWDER, LYOPHILIZED, FOR SOLUTION INTRAVENOUS at 17:01

## 2019-01-20 RX ADMIN — Medication 1 CAPSULE: at 08:32

## 2019-01-20 RX ADMIN — MEMANTINE 5 MG: 5 TABLET ORAL at 08:32

## 2019-01-20 RX ADMIN — POTASSIUM CHLORIDE 20 MEQ: 1.5 POWDER, FOR SOLUTION ORAL at 08:31

## 2019-01-20 RX ADMIN — METOPROLOL SUCCINATE 100 MG: 50 TABLET, EXTENDED RELEASE ORAL at 08:32

## 2019-01-20 RX ADMIN — AMIODARONE HYDROCHLORIDE 200 MG: 200 TABLET ORAL at 08:33

## 2019-01-20 RX ADMIN — ASPIRIN 325 MG: 325 TABLET ORAL at 08:32

## 2019-01-20 RX ADMIN — HYDRALAZINE HYDROCHLORIDE 25 MG: 25 TABLET, FILM COATED ORAL at 17:00

## 2019-01-20 RX ADMIN — Medication 10 ML: at 14:00

## 2019-01-20 RX ADMIN — CEFTRIAXONE 1 G: 1 INJECTION, POWDER, FOR SOLUTION INTRAMUSCULAR; INTRAVENOUS at 16:30

## 2019-01-20 RX ADMIN — HEPARIN SODIUM 5000 UNITS: 5000 INJECTION INTRAVENOUS; SUBCUTANEOUS at 17:00

## 2019-01-20 NOTE — PROGRESS NOTES
Paged Jamila Collado about critical potassium and calcium. Awaiting call back. 5:10 AM 
Spoke with  verbal orders for repeat BMP.

## 2019-01-20 NOTE — PROGRESS NOTES
Hospitalist Progress Note Cleveland Marx MD 
Answering service: [phone] OR [phone] from in house phone Cell: [phone] Date of Service:  2019 NAME:  Jessica Burnette :  10/20/1932 MRN:  [id number] Admission Summary:  
 
71-year-old woman with a past medical history significant for chronic kidney disease, chronic lymphocytic leukemia, hypertension, dyslipidemia, chronic atrial fibrillation, status post CVA, was in her usual state of health until about 5 days ago when the patient developed generalized weakness, which is progressive and getting worse. The patient also complained of diarrhea, which is profuse. No blood or mucus in the stool. No sick contact. As a result of the generalized weakness, the patient has not been able to carry out activities of daily living. She is in bed most of the time. It was reported that the patient is able to ambulate with a walker, but for the past 5 days, the patient has not been able to do that because of the weakness and the diarrhea. The weakness was described as generalized weakness. No history of a fall at home. No history of fever, rigors or chills. The patient was brought to the emergency room because of worsening weakness. The weakness is associated with poor oral intake and poor appetite. When the patient arrived at the emergency room, her urinalysis was consistent with a urinary tract infection. Chest x-ray shows abnormality which could be suggestive of pneumonia as well. The patient was then referred to the hospitalist service for evaluation for admission. She was last admitted to this hospital in 2017. The patient was admitted and treated for acute CVA. Interval history / Subjective:  
  
Patient with dementia. Poor historian. States her breathing is better No complaints. Assessment & Plan:  
 
Bacterial pneumonia with LLL infiltrate on Chest CT. -suspected gram negative 
-blood culture no growth so far 
-sputum culture 
-CT chest 1/16 Cardiomegaly. Adenopathy. Left lower lobe infiltrate 
-continue on Rocephin and Zithromax 
-SLP cleared the patient for mechanical soft Acute cystitis without hematuria, difficult to tell if this is a source of infection.  
-Patient on abx regardless Generalized weakness: 
-CT head negative for acute findings 
-likely secondary to PNA 
-PT/OT SNF Hyperkalemia 
-improved Chronic kidney disease stage III 
-stable, monitor. Transaminitis 
-sec to ? 
-Will hold statin for now Chronic lymphocytic leukemia, lost to follow-up. -Appreciate Oncology, outpatient follow up at discharge Hypertension 
-On Hydralazine/Metoprolol 
-Monitor Dementia, supportive therapy. PT/OT rehab likely SNF Code status: now the patient is DNR 
DVT prophylaxis: heparin PTA: home Plan: Continue antibiotics, awaiting placement Care Plan discussed with: Patient/Family and Nurse Disposition: TBD, needs bed Hospital Problems  Date Reviewed: 1/16/2019 Codes Class Noted POA * (Principal) Generalized weakness ICD-10-CM: R53.1 ICD-9-CM: 780.79  1/16/2019 Yes Review of Systems:  
Review of systems not obtained due to patient factors. Vital Signs:  
 Last 24hrs VS reviewed since prior progress note. Most recent are: 
Visit Vitals /86 (BP 1 Location: Left arm, BP Patient Position: At rest) Pulse (!) 53 Temp 98.5 °F (36.9 °C) Resp 16 Wt 47.6 kg (105 lb) SpO2 94% Breastfeeding? No  
BMI 17.47 kg/m² Intake/Output Summary (Last 24 hours) at 1/20/2019 7510 Last data filed at 1/19/2019 2216 Gross per 24 hour Intake 841 ml Output  Net 841 ml Physical Examination:  
 
  
Constitutional:  No acute distress, cooperative, pleasant   
ENT:  Neck supple Resp:  Slightly decreased sounds L base. No accessory muscle use CV:  Regular rhythm, normal rate GI:  Soft, non distended, non tender, normoactive bowel sounds Musculoskeletal:  No edema, warm Neurologic:  Moves all extremities. Alert to self Data Review:  
 Review and/or order of clinical lab test 
Review and/or order of tests in the radiology section of CPT Review and/or order of tests in the medicine section of CPT Labs:  
 
Recent Labs  
  01/20/19 
0200 01/19/19 
0050 WBC 99.1* 93.2* HGB 9.2* 9.1*  
HCT 29.2* 29.2*  
 179 Recent Labs  
  01/20/19 
0524 01/20/19 
0400 01/19/19 
0050  141 140  
K 3.2* 6.1* 5.0  
* 110* 110* CO2 26 21 25 BUN 16 13 18 CREA 0.84 0.87 1.03* GLU 77 62* 73  
CA 8.1* 5.9* 7.8* Recent Labs  
  01/19/19 
0050 SGOT 148* * AP 69 TBILI 0.5 TP 4.9* ALB 1.8*  
GLOB 3.1 No results for input(s): INR, PTP, APTT in the last 72 hours. No lab exists for component: INREXT, INREXT No results for input(s): FE, TIBC, PSAT, FERR in the last 72 hours. No results found for: FOL, RBCF No results for input(s): PH, PCO2, PO2 in the last 72 hours. No results for input(s): CPK, CKNDX, TROIQ in the last 72 hours. No lab exists for component: CPKMB Lab Results Component Value Date/Time Cholesterol, total 145 07/01/2017 02:48 AM  
 HDL Cholesterol 60 07/01/2017 02:48 AM  
 LDL, calculated 72.8 07/01/2017 02:48 AM  
 Triglyceride 61 07/01/2017 02:48 AM  
 CHOL/HDL Ratio 2.4 07/01/2017 02:48 AM  
 
Lab Results Component Value Date/Time Glucose (POC) 96 01/20/2019 07:29 AM  
 Glucose (POC) 102 (H) 01/17/2019 10:10 AM  
 Glucose (POC) 114 (H) 07/08/2017 09:43 PM  
 Glucose (POC) 92 06/30/2017 06:49 AM  
 Glucose (POC) 90 11/02/2014 12:20 PM  
 
Lab Results Component Value Date/Time  Color DARK YELLOW 01/16/2019 11:40 AM  
 Appearance CLOUDY (A) 01/16/2019 11:40 AM  
 Specific gravity 1.020 01/16/2019 11:40 AM  
 pH (UA) 6.0 01/16/2019 11:40 AM  
 Protein TRACE (A) 01/16/2019 11:40 AM  
 Glucose NEGATIVE  01/16/2019 11:40 AM  
 Ketone NEGATIVE  01/16/2019 11:40 AM  
 Bilirubin NEGATIVE  01/16/2019 11:40 AM  
 Urobilinogen 1.0 01/16/2019 11:40 AM  
 Nitrites NEGATIVE  01/16/2019 11:40 AM  
 Leukocyte Esterase MODERATE (A) 01/16/2019 11:40 AM  
 Epithelial cells FEW 01/16/2019 11:40 AM  
 Bacteria 1+ (A) 01/16/2019 11:40 AM  
 WBC 5-10 01/16/2019 11:40 AM  
 RBC 0-5 01/16/2019 11:40 AM  
 
 
 
Medications Reviewed:  
 
Current Facility-Administered Medications Medication Dose Route Frequency  lactated Ringers infusion  75 mL/hr IntraVENous CONTINUOUS  
 amiodarone (CORDARONE) tablet 200 mg  200 mg Oral BID  aspirin tablet 325 mg  325 mg Oral DAILY  hydrALAZINE (APRESOLINE) tablet 25 mg  25 mg Oral TID WITH MEALS  memantine (NAMENDA) tablet 5 mg  5 mg Oral DAILY  metoprolol succinate (TOPROL-XL) XL tablet 100 mg  100 mg Oral DAILY  sodium chloride (NS) flush 5-40 mL  5-40 mL IntraVENous Q8H  
 sodium chloride (NS) flush 5-40 mL  5-40 mL IntraVENous PRN  
 heparin (porcine) injection 5,000 Units  5,000 Units SubCUTAneous Q8H  
 lactobac ac& pc-s.therm-b.anim (JEREMIAH Q/RISAQUAD)  1 Cap Oral DAILY  cefTRIAXone (ROCEPHIN) 1 g in 0.9% sodium chloride (MBP/ADV) 50 mL  1 g IntraVENous Q24H  
 azithromycin (ZITHROMAX) 500 mg in 0.9% sodium chloride 250 mL ([id number])  500 mg IntraVENous Q24H  
 
______________________________________________________________________ EXPECTED LENGTH OF STAY: 3d 7h 
ACTUAL LENGTH OF STAY:          4 Lan Adams MD

## 2019-01-21 LAB
ANION GAP SERPL CALC-SCNC: 6 MMOL/L (ref 5–15)
BUN SERPL-MCNC: 12 MG/DL (ref 6–20)
BUN/CREAT SERPL: 15 (ref 12–20)
CALCIUM SERPL-MCNC: 7.8 MG/DL (ref 8.5–10.1)
CHLORIDE SERPL-SCNC: 111 MMOL/L (ref 97–108)
CO2 SERPL-SCNC: 26 MMOL/L (ref 21–32)
CREAT SERPL-MCNC: 0.82 MG/DL (ref 0.55–1.02)
GLUCOSE SERPL-MCNC: 73 MG/DL (ref 65–100)
POTASSIUM SERPL-SCNC: 3.6 MMOL/L (ref 3.5–5.1)
SODIUM SERPL-SCNC: 143 MMOL/L (ref 136–145)

## 2019-01-21 PROCEDURE — 74011250636 HC RX REV CODE- 250/636: Performed by: INTERNAL MEDICINE

## 2019-01-21 PROCEDURE — 74011250637 HC RX REV CODE- 250/637: Performed by: INTERNAL MEDICINE

## 2019-01-21 PROCEDURE — 94760 N-INVAS EAR/PLS OXIMETRY 1: CPT

## 2019-01-21 PROCEDURE — 74011000258 HC RX REV CODE- 258: Performed by: INTERNAL MEDICINE

## 2019-01-21 PROCEDURE — 80048 BASIC METABOLIC PNL TOTAL CA: CPT

## 2019-01-21 PROCEDURE — 97530 THERAPEUTIC ACTIVITIES: CPT

## 2019-01-21 PROCEDURE — 36415 COLL VENOUS BLD VENIPUNCTURE: CPT

## 2019-01-21 PROCEDURE — 65270000029 HC RM PRIVATE

## 2019-01-21 RX ADMIN — MEMANTINE 5 MG: 5 TABLET ORAL at 10:30

## 2019-01-21 RX ADMIN — HEPARIN SODIUM 5000 UNITS: 5000 INJECTION INTRAVENOUS; SUBCUTANEOUS at 18:17

## 2019-01-21 RX ADMIN — METOPROLOL SUCCINATE 100 MG: 50 TABLET, EXTENDED RELEASE ORAL at 10:27

## 2019-01-21 RX ADMIN — HYDRALAZINE HYDROCHLORIDE 25 MG: 25 TABLET, FILM COATED ORAL at 10:30

## 2019-01-21 RX ADMIN — SODIUM CHLORIDE, SODIUM LACTATE, POTASSIUM CHLORIDE, AND CALCIUM CHLORIDE 75 ML/HR: 600; 310; 30; 20 INJECTION, SOLUTION INTRAVENOUS at 10:37

## 2019-01-21 RX ADMIN — HEPARIN SODIUM 5000 UNITS: 5000 INJECTION INTRAVENOUS; SUBCUTANEOUS at 01:19

## 2019-01-21 RX ADMIN — HEPARIN SODIUM 5000 UNITS: 5000 INJECTION INTRAVENOUS; SUBCUTANEOUS at 10:27

## 2019-01-21 RX ADMIN — ASPIRIN 325 MG: 325 TABLET ORAL at 10:27

## 2019-01-21 RX ADMIN — Medication 1 CAPSULE: at 10:27

## 2019-01-21 RX ADMIN — AMIODARONE HYDROCHLORIDE 200 MG: 200 TABLET ORAL at 10:27

## 2019-01-21 RX ADMIN — AMIODARONE HYDROCHLORIDE 200 MG: 200 TABLET ORAL at 18:20

## 2019-01-21 RX ADMIN — AZITHROMYCIN MONOHYDRATE 500 MG: 500 INJECTION, POWDER, LYOPHILIZED, FOR SOLUTION INTRAVENOUS at 19:20

## 2019-01-21 RX ADMIN — Medication 10 ML: at 13:56

## 2019-01-21 RX ADMIN — CEFTRIAXONE 1 G: 1 INJECTION, POWDER, FOR SOLUTION INTRAMUSCULAR; INTRAVENOUS at 18:19

## 2019-01-21 NOTE — WOUND CARE
WOCN Note:  
 
New consult placed for assessment of sacrum and buttocks. Chart reviewed. Admitted DX:  Generalized weakness Past Medical History:  chronic kidney disease, chronic lymphocytic leukemia, hypertension, dyslipidemia, chronic atrial fibrillation, status post CVA Admitted from home Assessment:  
Patient is alert, communicative and requires assist with repositioning. Bed: total care Patient wearing briefs for incontinence. Patient reports no pain. Heels offloaded on pillow. Heels intact without erythema. 1.  Left buttock, partial thickness wound from friction and shearing:  2 x 3 x 0.1 cm; 100% red; no exudate or odor. Cleansed and applied Duoderm. 2.  Sacrum, partial thickness wound from friction and shearing:  0.2 x 0.2 x 0.1 cm;  Cleansed, dried and applied Marathon. Recommendations:   
Left buttock:  Every 7 days and as needed cleanse, pat dry and apply Duoderm. Skin Care & Pressure Prevention: 
Minimize layers of linen/pads under patient to optimize support surface. Turn/reposition approximately every 2 hours and offload heels. Manage incontinence / promote continence Discussed above plan with patient & RN. Transition of Care: Plan to follow as needed while admitted to hospital. 
 
JUANITA LeyvaN RN [facility], [facility]. Wound Care Office [phone] Pager [phone]

## 2019-01-21 NOTE — PROGRESS NOTES
Problem: Falls - Risk of 
Goal: *Absence of Falls Document Jazmine Shows Fall Risk and appropriate interventions in the flowsheet. Outcome: Progressing Towards Goal 
Fall Risk Interventions: 
  
 
Mentation Interventions: Adequate sleep, hydration, pain control, Door open when patient unattended Medication Interventions: Evaluate medications/consider consulting pharmacy, Patient to call before getting OOB, Teach patient to arise slowly Elimination Interventions: Call light in reach, Patient to call for help with toileting needs, Toilet paper/wipes in reach, Toileting schedule/hourly rounds

## 2019-01-21 NOTE — PROGRESS NOTES
Hospitalist Progress Note Nathalia Contreras MD 
Answering service: [phone] OR [phone] from in house phone Cell: [phone] Date of Service:  2019 NAME:  Claudell Critchley :  10/20/1932 MRN:  [id number] Admission Summary:  
 
78-year-old woman with a past medical history significant for chronic kidney disease, chronic lymphocytic leukemia, hypertension, dyslipidemia, chronic atrial fibrillation, status post CVA, was in her usual state of health until about 5 days ago when the patient developed generalized weakness, which is progressive and getting worse. The patient also complained of diarrhea, which is profuse. No blood or mucus in the stool. No sick contact. As a result of the generalized weakness, the patient has not been able to carry out activities of daily living. She is in bed most of the time. It was reported that the patient is able to ambulate with a walker, but for the past 5 days, the patient has not been able to do that because of the weakness and the diarrhea. The weakness was described as generalized weakness. No history of a fall at home. No history of fever, rigors or chills. The patient was brought to the emergency room because of worsening weakness. The weakness is associated with poor oral intake and poor appetite. When the patient arrived at the emergency room, her urinalysis was consistent with a urinary tract infection. Chest x-ray shows abnormality which could be suggestive of pneumonia as well. The patient was then referred to the hospitalist service for evaluation for admission. She was last admitted to this hospital in 2017. The patient was admitted and treated for acute CVA. Interval history / Subjective:  
  
Patient with dementia. Poor historian. No complaints. Awaiting placement.   
 
Assessment & Plan:  
 
Bacterial pneumonia with LLL infiltrate on Chest CT 
 -suspected gram negative 
-blood culture no growth so far 
-sputum culture 
-CT chest 1/16 Cardiomegaly. Adenopathy. Left lower lobe infiltrate 
-continue on Rocephin and Zithromax 
-SLP cleared the patient for mechanical soft Acute cystitis without hematuria, difficult to tell if this is a source of infection.  
-Patient on abx regardless Generalized weakness: 
-CT head negative for acute findings -PT/OT - SNF Hyperkalemia 
-improved Chronic kidney disease stage III 
-stable, monitor. Transaminitis 
-sec to ? 
-Will hold statin for now Chronic lymphocytic leukemia, lost to follow-up. -Appreciate Oncology, outpatient follow up at discharge Hypertension 
-On Hydralazine/Metoprolol 
-Monitor Dementia, supportive therapy. Code status: DNR 
DVT prophylaxis: heparin PTA: home Plan: Continue antibiotics, awaiting placement Care Plan discussed with: Patient/Family and Nurse Disposition: TBD, needs placement Hospital Problems  Date Reviewed: 1/16/2019 Codes Class Noted POA * (Principal) Generalized weakness ICD-10-CM: R53.1 ICD-9-CM: 780.79  1/16/2019 Yes Review of Systems:  
Review of systems not obtained due to patient factors. Vital Signs:  
 Last 24hrs VS reviewed since prior progress note. Most recent are: 
Visit Vitals /78 Pulse (!) 52 Temp 98.9 °F (37.2 °C) Resp 17 Wt 49.2 kg (108 lb 8 oz) SpO2 95% Breastfeeding? No  
BMI 18.06 kg/m² Intake/Output Summary (Last 24 hours) at 1/21/2019 8148 Last data filed at 1/20/2019 1319 Gross per 24 hour Intake 1108.75 ml Output  Net 1108.75 ml Physical Examination:  
 
  
Constitutional:  No acute distress, cooperative, pleasant   
ENT:  Neck supple Resp:  Slightly decreased sounds L base. No accessory muscle use CV:  Regular rhythm, normal rate GI:  Soft, non distended, non tender, normoactive bowel sounds Musculoskeletal:  No edema, warm Neurologic:  Moves all extremities. Alert to self Data Review:  
 Review and/or order of clinical lab test 
Review and/or order of tests in the radiology section of CPT Review and/or order of tests in the medicine section of CPT Labs:  
 
Recent Labs  
  01/20/19 
0200 01/19/19 
0050 WBC 99.1* 93.2* HGB 9.2* 9.1*  
HCT 29.2* 29.2*  
 179 Recent Labs  
  01/21/19 
0506 01/20/19 
0524 01/20/19 
0400  145 141  
K 3.6 3.2* 6.1*  
* 110* 110* CO2 26 26 21 BUN 12 16 13 CREA 0.82 0.84 0.87 GLU 73 77 62* CA 7.8* 8.1* 5.9* Recent Labs  
  01/19/19 
0050 SGOT 148* * AP 69 TBILI 0.5 TP 4.9* ALB 1.8*  
GLOB 3.1 No results for input(s): INR, PTP, APTT in the last 72 hours. No lab exists for component: INREXT, INREXT No results for input(s): FE, TIBC, PSAT, FERR in the last 72 hours. No results found for: FOL, RBCF No results for input(s): PH, PCO2, PO2 in the last 72 hours. No results for input(s): CPK, CKNDX, TROIQ in the last 72 hours. No lab exists for component: CPKMB Lab Results Component Value Date/Time Cholesterol, total 145 07/01/2017 02:48 AM  
 HDL Cholesterol 60 07/01/2017 02:48 AM  
 LDL, calculated 72.8 07/01/2017 02:48 AM  
 Triglyceride 61 07/01/2017 02:48 AM  
 CHOL/HDL Ratio 2.4 07/01/2017 02:48 AM  
 
Lab Results Component Value Date/Time Glucose (POC) 96 01/20/2019 07:29 AM  
 Glucose (POC) 102 (H) 01/17/2019 10:10 AM  
 Glucose (POC) 114 (H) 07/08/2017 09:43 PM  
 Glucose (POC) 92 06/30/2017 06:49 AM  
 Glucose (POC) 90 11/02/2014 12:20 PM  
 
Lab Results Component Value Date/Time  Color DARK YELLOW 01/16/2019 11:40 AM  
 Appearance CLOUDY (A) 01/16/2019 11:40 AM  
 Specific gravity 1.020 01/16/2019 11:40 AM  
 pH (UA) 6.0 01/16/2019 11:40 AM  
 Protein TRACE (A) 01/16/2019 11:40 AM  
 Glucose NEGATIVE  01/16/2019 11:40 AM  
 Ketone NEGATIVE  01/16/2019 11:40 AM  
 Bilirubin NEGATIVE  01/16/2019 11:40 AM  
 Urobilinogen 1.0 01/16/2019 11:40 AM  
 Nitrites NEGATIVE  01/16/2019 11:40 AM  
 Leukocyte Esterase MODERATE (A) 01/16/2019 11:40 AM  
 Epithelial cells FEW 01/16/2019 11:40 AM  
 Bacteria 1+ (A) 01/16/2019 11:40 AM  
 WBC 5-10 01/16/2019 11:40 AM  
 RBC 0-5 01/16/2019 11:40 AM  
 
 
 
Medications Reviewed:  
 
Current Facility-Administered Medications Medication Dose Route Frequency  lactated Ringers infusion  75 mL/hr IntraVENous CONTINUOUS  
 amiodarone (CORDARONE) tablet 200 mg  200 mg Oral BID  aspirin tablet 325 mg  325 mg Oral DAILY  hydrALAZINE (APRESOLINE) tablet 25 mg  25 mg Oral TID WITH MEALS  memantine (NAMENDA) tablet 5 mg  5 mg Oral DAILY  metoprolol succinate (TOPROL-XL) XL tablet 100 mg  100 mg Oral DAILY  sodium chloride (NS) flush 5-40 mL  5-40 mL IntraVENous Q8H  
 sodium chloride (NS) flush 5-40 mL  5-40 mL IntraVENous PRN  
 heparin (porcine) injection 5,000 Units  5,000 Units SubCUTAneous Q8H  
 lactobac ac& pc-s.therm-b.anim (JEREMIAH Q/RISAQUAD)  1 Cap Oral DAILY  cefTRIAXone (ROCEPHIN) 1 g in 0.9% sodium chloride (MBP/ADV) 50 mL  1 g IntraVENous Q24H  
 azithromycin (ZITHROMAX) 500 mg in 0.9% sodium chloride 250 mL ([id number])  500 mg IntraVENous Q24H  
 
______________________________________________________________________ EXPECTED LENGTH OF STAY: 3d 7h 
ACTUAL LENGTH OF STAY:          5 Keegan Buckner MD

## 2019-01-21 NOTE — PROGRESS NOTES
Problem: Mobility Impaired (Adult and Pediatric) Goal: *Acute Goals and Plan of Care (Insert Text) Physical Therapy Goals Initiated 1/17/2019 1. Patient will move from supine to sit and sit to supine  and roll side to side in bed with minimal assistance/contact guard assist within 7 day(s). 2.  Patient will transfer from bed to chair and chair to bed with moderate assistance  using the least restrictive device within 7 day(s). 3.  Patient will perform sit to stand with moderate assistance  within 7 day(s). 4.  Patient will ambulate with minimal assistance/contact guard assist for 25 feet with the least restrictive device within 7 day(s). physical Therapy TREATMENT Patient: Elizabeth Villarreal (85 y.o. female) Date: 1/21/2019 Diagnosis: Generalized weakness Generalized weakness Precautions: Contact, Fall Chart, physical therapy assessment, plan of care and goals were reviewed. ASSESSMENT: 
The patient presents with Maximum assistance functional transfers and bed mobility. unable to ambulate,  . The following are barriers to independence while in acute care:  
-Cognitive and/or behavioral: orientation(oriented to self) -Medical condition: strength, sitting balance, standing balance and pain tolerance Prior level of function: modified independent/supervision with transfers, bed mobility and gait with RW   
 
PLAN: 
Patient continues to benefit from skilled intervention to address the above impairments. Continue treatment per established plan of care. Recommendations and Planned Interventions: 
Recommend with nursing patient to complete as able in order to maintain strength, endurance and independence: OOB to chair 2- 3x/day with max assist. Thank you for your assistance. Discharge recommendations: Rehab at skilled nursing facility (SNF): patient is working towards tolerating 3 hours of therapy (see barriers to discharging home) Patient's barriers to discharging home, in addition to above impairments: family availability to assist. 
As patient is much more dependent at this time. Equipment recommendations for successful discharge (if) home: none SUBJECTIVE:  
Patient stated My bottom hurts.  OBJECTIVE DATA SUMMARY:  
Critical Behavior: 
Neurologic State: Alert Orientation Level: Oriented to person Cognition: Decreased command following, Decreased attention/concentration Safety/Judgement: Decreased awareness of environment, Decreased insight into deficits Functional Mobility Training: 
Bed Mobility: 
Rolling: Maximum assistance Supine to Sit: Maximum assistance Scooting: Total assistance Transfers: 
Sit to Stand: Maximum assistance Stand to Sit: Maximum assistance Stand Pivot Transfers: Maximum assistance Bed to Chair: Assist x2 Balance: 
Sitting: Impaired; Without support Sitting - Static: Poor (constant support) Sitting - Dynamic: Poor (constant support) Standing: Impaired; With support Standing - Static: Poor Standing - Dynamic : Poor Activity Tolerance:  
poor Please refer to the flowsheet for vital signs taken during this treatment. elevated BP After treatment patient left:  
Up in chair Call light within reach RN notified COMMUNICATION/COLLABORATION:  
The patients plan of care was discussed with: Registered Nurse and  Emanuel Hess PT Time Calculation: 16 mins

## 2019-01-22 LAB
ANION GAP SERPL CALC-SCNC: 7 MMOL/L (ref 5–15)
BACTERIA SPEC CULT: NORMAL
BASOPHILS # BLD: 0 K/UL (ref 0–0.1)
BASOPHILS NFR BLD: 0 % (ref 0–1)
BUN SERPL-MCNC: 12 MG/DL (ref 6–20)
BUN/CREAT SERPL: 15 (ref 12–20)
CALCIUM SERPL-MCNC: 7.8 MG/DL (ref 8.5–10.1)
CHLORIDE SERPL-SCNC: 111 MMOL/L (ref 97–108)
CO2 SERPL-SCNC: 25 MMOL/L (ref 21–32)
CREAT SERPL-MCNC: 0.8 MG/DL (ref 0.55–1.02)
DIFFERENTIAL METHOD BLD: ABNORMAL
EOSINOPHIL # BLD: 0 K/UL (ref 0–0.4)
EOSINOPHIL NFR BLD: 0 % (ref 0–7)
ERYTHROCYTE [DISTWIDTH] IN BLOOD BY AUTOMATED COUNT: 15.7 % (ref 11.5–14.5)
GLUCOSE SERPL-MCNC: 74 MG/DL (ref 65–100)
HCT VFR BLD AUTO: 30.3 % (ref 35–47)
HGB BLD-MCNC: 9.7 G/DL (ref 11.5–16)
IMM GRANULOCYTES # BLD AUTO: 0 K/UL
IMM GRANULOCYTES NFR BLD AUTO: 0 %
LYMPHOCYTES # BLD: 105.8 K/UL (ref 0.8–3.5)
LYMPHOCYTES NFR BLD: 92 % (ref 12–49)
MCH RBC QN AUTO: 29.7 PG (ref 26–34)
MCHC RBC AUTO-ENTMCNC: 32 G/DL (ref 30–36.5)
MCV RBC AUTO: 92.7 FL (ref 80–99)
MONOCYTES # BLD: 3.4 K/UL (ref 0–1)
MONOCYTES NFR BLD: 3 % (ref 5–13)
NEUTS SEG # BLD: 5.7 K/UL (ref 1.8–8)
NEUTS SEG NFR BLD: 5 % (ref 32–75)
NRBC # BLD: 0 K/UL (ref 0–0.01)
NRBC BLD-RTO: 0 PER 100 WBC
PLATELET # BLD AUTO: 241 K/UL (ref 150–400)
PMV BLD AUTO: 11.5 FL (ref 8.9–12.9)
POTASSIUM SERPL-SCNC: 4.3 MMOL/L (ref 3.5–5.1)
RBC # BLD AUTO: 3.27 M/UL (ref 3.8–5.2)
RBC MORPH BLD: ABNORMAL
RBC MORPH BLD: ABNORMAL
SERVICE CMNT-IMP: NORMAL
SODIUM SERPL-SCNC: 143 MMOL/L (ref 136–145)
WBC # BLD AUTO: 114.9 K/UL (ref 3.6–11)
WBC MORPH BLD: ABNORMAL

## 2019-01-22 PROCEDURE — 65270000029 HC RM PRIVATE

## 2019-01-22 PROCEDURE — 36415 COLL VENOUS BLD VENIPUNCTURE: CPT

## 2019-01-22 PROCEDURE — 97535 SELF CARE MNGMENT TRAINING: CPT

## 2019-01-22 PROCEDURE — 97530 THERAPEUTIC ACTIVITIES: CPT

## 2019-01-22 PROCEDURE — 80048 BASIC METABOLIC PNL TOTAL CA: CPT

## 2019-01-22 PROCEDURE — 85025 COMPLETE CBC W/AUTO DIFF WBC: CPT

## 2019-01-22 PROCEDURE — 74011250636 HC RX REV CODE- 250/636: Performed by: INTERNAL MEDICINE

## 2019-01-22 RX ORDER — METOPROLOL SUCCINATE 25 MG/1
25 TABLET, EXTENDED RELEASE ORAL DAILY
Status: DISCONTINUED | OUTPATIENT
Start: 2019-01-22 | End: 2019-01-22

## 2019-01-22 RX ADMIN — Medication 10 ML: at 21:52

## 2019-01-22 RX ADMIN — HEPARIN SODIUM 5000 UNITS: 5000 INJECTION INTRAVENOUS; SUBCUTANEOUS at 00:47

## 2019-01-22 RX ADMIN — Medication 10 ML: at 07:50

## 2019-01-22 RX ADMIN — SODIUM CHLORIDE, SODIUM LACTATE, POTASSIUM CHLORIDE, AND CALCIUM CHLORIDE 75 ML/HR: 600; 310; 30; 20 INJECTION, SOLUTION INTRAVENOUS at 12:11

## 2019-01-22 NOTE — PROGRESS NOTES
01/22/19 1509 Vital Signs Temp 98.2 °F (36.8 °C) Temp Source Oral  
Pulse (Heart Rate) (!) 50 
(Notified RN) Heart Rate Source Monitor Resp Rate 18  
O2 Sat (%) 97 % Level of Consciousness Alert /84 MAP (Calculated) 106 BP 1 Method Automatic  
BP 1 Location Right arm BP Patient Position At rest  
MEWS Score 2 Dr Maria Elena Starkey aware, granddaughter is here and may be able to help with an ecg

## 2019-01-22 NOTE — PROGRESS NOTES
CM received follow-up calls from [address] [place] [address] and [facility]. Both facilities are will to accept patient pending insurance auth. CM called the patient's granddaughter Robert Andrews) to provide updates. [place] [place] has selected [facility]. CM will call Becky at [phone] [facility] ([phone]) to initiate authorization. Facility needs current PT/OT notes to send with insurance auth request.  Hopefully plan for d/c tomorrow. Will continue to follow.  VIBHA Rock, CRM

## 2019-01-22 NOTE — PROGRESS NOTES
Hospitalist Progress Note Venice Chase MD 
Answering service: [phone] or [phone] from in house phone Date of Service:  2019 NAME:  Mary Welch :  10/20/1932 MRN:  [id number] Admission Summary:  
 
57-year-old woman with a past medical history significant for chronic kidney disease, chronic lymphocytic leukemia, hypertension, dyslipidemia, chronic atrial fibrillation, status post CVA, was in her usual state of health until about 5 days ago when the patient developed generalized weakness, which is progressive and getting worse. The patient also complained of diarrhea, which is profuse. No blood or mucus in the stool. No sick contact. As a result of the generalized weakness, the patient has not been able to carry out activities of daily living. She is in bed most of the time. It was reported that the patient is able to ambulate with a walker, but for the past 5 days, the patient has not been able to do that because of the weakness and the diarrhea. The weakness was described as generalized weakness. No history of a fall at home. No history of fever, rigors or chills. The patient was brought to the emergency room because of worsening weakness. The weakness is associated with poor oral intake and poor appetite. When the patient arrived at the emergency room, her urinalysis was consistent with a urinary tract infection. Chest x-ray shows abnormality which could be suggestive of pneumonia as well. The patient was then referred to the hospitalist service for evaluation for admission. She was last admitted to this hospital in 2017. The patient was admitted and treated for acute CVA. Interval history / Subjective:  
  
 
Patient with dementia. Poor historian. Denied any pain.  
 
  
 
Assessment & Plan:  
 
Bradycardia: 
-HR in 50s  
-patient has history of paroxysmal atrial flutter and on amiodarone 200 mg BID and toprol  mg  
-Patient refused EKG today. -EKG on admission showed SVT with 2:1 conduction 
-will stop troprol XL today 
-She follows with  as OP -will consult cardiology team 
-will check TSH Paroxysmal atrial fib: 
-HR now in 50s as discussed above 
-on aspirin 325 mg Per chart review not on anticoagulation due to ? amyloid angiopathy -risk of bleeding Bacterial pneumonia with LLL infiltrate on Chest CT 
-suspected gram negative 
-blood ulture no growth so far 
-completed ceftriaxone and zithromax Bacteriuria: difficult to tell if this is a source of infection. Generalized weakness: 
-CT head negative for acute findings -PT/OT - SNF Hyperkalemia 
-improved Chronic kidney disease stage II-III 
-creatinine not reliable as she has low muscle mass 
-stable, monitor. Transaminitis 
-sec to ? amiodarone 
-Will hold statin for now -LFTs trending down 
-patient refusing tests and meds today Chronic lymphocytic leukemia, lost to follow-up. -Appreciate Oncology, outpatient follow up at discharge Hypertension 
-On Hydralazine. D/c beta blocker. 
-will add amlodipine if needed She refused all her medications today 
-Monitor. Dementia, supportive therapy. Code status: DNR 
DVT prophylaxis: heparin PTA: home Plan:  D/c SNF 1-2 days Care Plan discussed with: Patient/Family and Nurse Disposition: TBD, needs placement Hospital Problems  Date Reviewed: 1/16/2019 Codes Class Noted POA * (Principal) Generalized weakness ICD-10-CM: R53.1 ICD-9-CM: 780.79  1/16/2019 Yes Review of Systems:  
Review of systems not obtained due to patient factors. Vital Signs:  
 Last 24hrs VS reviewed since prior progress note. Most recent are: 
Visit Vitals /84 (BP 1 Location: Right arm, BP Patient Position: At rest) Pulse (!) 50 Temp 98.2 °F (36.8 °C) Resp 18 Wt 49.3 kg (108 lb 11 oz) SpO2 97% Breastfeeding?  No  
 BMI 18.09 kg/m² No intake or output data in the 24 hours ending 01/22/19 1801 Physical Examination:  
 
  
Constitutional:  No acute distress, cooperative, pleasant   
ENT:  Neck supple Resp:  decreased sounds at bases. No accessory muscle use CV:  Regular rhythm, decreased rate GI:  Soft, non distended, non tender, normoactive bowel sounds Musculoskeletal:  No edema, warm Neurologic:  Moves all extremities. Alert to self Data Review:  
 Review and/or order of clinical lab test 
Review and/or order of tests in the radiology section of CPT Review and/or order of tests in the medicine section of [facility] Labs:  
 
Recent Labs  
  01/22/19 
0107 01/20/19 
0200 .9* 99.1* HGB 9.7* 9.2* HCT 30.3* 29.2*  
 191 Recent Labs  
  01/22/19 
0107 01/21/19 
0506 01/20/19 
2228  143 145  
K 4.3 3.6 3.2*  
* 111* 110* CO2 25 26 26 BUN 12 12 16 CREA 0.80 0.82 0.84 GLU 74 73 77 CA 7.8* 7.8* 8.1* No results for input(s): SGOT, GPT, ALT, AP, TBIL, TBILI, TP, ALB, GLOB, GGT, AML, LPSE in the last 72 hours. No lab exists for component: AMYP, HLPSE No results for input(s): INR, PTP, APTT in the last 72 hours. No lab exists for component: INREXT, INREXT No results for input(s): FE, TIBC, PSAT, FERR in the last 72 hours. No results found for: FOL, RBCF No results for input(s): PH, PCO2, PO2 in the last 72 hours. No results for input(s): CPK, CKNDX, TROIQ in the last 72 hours. No lab exists for component: CPKMB Lab Results Component Value Date/Time Cholesterol, total 145 07/01/2017 02:48 AM  
 HDL Cholesterol 60 07/01/2017 02:48 AM  
 LDL, calculated 72.8 07/01/2017 02:48 AM  
 Triglyceride 61 07/01/2017 02:48 AM  
 CHOL/HDL Ratio 2.4 07/01/2017 02:48 AM  
 
Lab Results Component Value Date/Time  Glucose (POC) 96 01/20/2019 07:29 AM  
 Glucose (POC) 102 (H) 01/17/2019 10:10 AM  
 Glucose (POC) 114 (H) 07/08/2017 09:43 PM  
 Glucose (POC) 92 06/30/2017 06:49 AM  
 Glucose (POC) 90 11/02/2014 12:20 PM  
 
Lab Results Component Value Date/Time Color DARK YELLOW 01/16/2019 11:40 AM  
 Appearance CLOUDY (A) 01/16/2019 11:40 AM  
 Specific gravity 1.020 01/16/2019 11:40 AM  
 pH (UA) 6.0 01/16/2019 11:40 AM  
 Protein TRACE (A) 01/16/2019 11:40 AM  
 Glucose NEGATIVE  01/16/2019 11:40 AM  
 Ketone NEGATIVE  01/16/2019 11:40 AM  
 Bilirubin NEGATIVE  01/16/2019 11:40 AM  
 Urobilinogen 1.0 01/16/2019 11:40 AM  
 Nitrites NEGATIVE  01/16/2019 11:40 AM  
 Leukocyte Esterase MODERATE (A) 01/16/2019 11:40 AM  
 Epithelial cells FEW 01/16/2019 11:40 AM  
 Bacteria 1+ (A) 01/16/2019 11:40 AM  
 WBC 5-10 01/16/2019 11:40 AM  
 RBC 0-5 01/16/2019 11:40 AM  
 
 
 
Medications Reviewed:  
 
Current Facility-Administered Medications Medication Dose Route Frequency  lactated Ringers infusion  75 mL/hr IntraVENous CONTINUOUS  
 amiodarone (CORDARONE) tablet 200 mg  200 mg Oral BID  aspirin tablet 325 mg  325 mg Oral DAILY  hydrALAZINE (APRESOLINE) tablet 25 mg  25 mg Oral TID WITH MEALS  memantine (NAMENDA) tablet 5 mg  5 mg Oral DAILY  sodium chloride (NS) flush 5-40 mL  5-40 mL IntraVENous Q8H  
 sodium chloride (NS) flush 5-40 mL  5-40 mL IntraVENous PRN  
 heparin (porcine) injection 5,000 Units  5,000 Units SubCUTAneous Q8H  
 lactobac ac& pc-s.therm-b.anim (JEREMIAH Q/RISAQUAD)  1 Cap Oral DAILY  
 
______________________________________________________________________ EXPECTED LENGTH OF STAY: 3d 7h 
ACTUAL LENGTH OF STAY:          6 Ernesto Paniagua MD

## 2019-01-22 NOTE — PROGRESS NOTES
Problem: Self Care Deficits Care Plan (Adult) Goal: *Acute Goals and Plan of Care (Insert Text) Occupational Therapy Goals Initiated 1/17/2019 1. Patient will perform self-feeding with supervision/set-up within 7 day(s). 2.  Patient will perform grooming task EOB without support with supervision/set-up within 7 day(s). 3.  Patient will perform seated bathing with minimal assistance/contact guard assist within 7 day(s). 4.  Patient will perform toilet transfers to [facility] with RW with moderate assistance  within 7 day(s). 5.  Patient will perform all aspects of toileting with moderate assistance  within 7 day(s). Occupational Therapy TREATMENT Patient: Wilman Hoffman (65 y.o. female) Date: 1/22/2019 Diagnosis: Generalized weakness Generalized weakness Precautions: Contact, Fall Chart, occupational therapy assessment, plan of care, and goals were reviewed. ASSESSMENT:  
The patient presents with Total assistance upper body ADLs, Total assistance lower body ADLs, and Total assistance assist functional mobility with assist of 1 to 2. The following are barriers to independence with ADLs while in acute care:  
- Cognitive and/or behavioral: orientation, attention to task, command following , processing and initiation - Medical condition: strength and medical history,    
- Other:  History of dementia PLOF: Per chart, lived with granddaughter and was Mod I with RW and ADLs PLAN: 
Patient continues to benefit from skilled intervention to address the above impairments. Continue treatment per established plan of care. Recommendations and Planned Interventions: 
Self care, self feeding, bed mobility, sitting Discharge recommendations: Rehab at skilled nursing facility (SNF): patient is working towards tolerating 3 hours of therapy (see barriers to discharging home) Barriers to discharging home, in addition to above listed impairments: Patient requiring total care. Equipment recommendations for successful discharge (if) home: 24/7 care, hospital bed, W/C, Jack lift SUBJECTIVE:  
Patient stated No. OBJECTIVE DATA SUMMARY:  
Cognitive/Behavioral Status: 
Neurologic State: Lethargic(on arrival, alert with the stimulation of movement) Orientation Level: Disoriented to time;Disoriented to situation;Disoriented to place(responds to her first name) Cognition: Decreased attention/concentration; No command following; Impaired decision making Perception: Appears intact Perseveration: No perseveration noted Safety/Judgement: Decreased insight into deficits; Decreased awareness of environment Functional Mobility and Transfers for ADLs:Bed Mobility: 
Rolling: Maximum assistance;Assist x1 Transfers: 
  
  
  
 
Balance: ADL Intervention: 
Feeding Feeding Assistance: (declined eating) Drink to Mouth: Total assistance (dependent)(requested one sip) Grooming Washing Face: (declined) Toileting Toileting Assistance: Total assistance(dependent)(incontinent) Bladder Hygiene: Total assistance (dependent) Bowel Hygiene: Total assistance (dependent) Clothing Management: Total assistance (dependent) Cognitive Retraining Organizing/Sequencing: Breaking task down Safety/Judgement: Decreased insight into deficits; Decreased awareness of environment Cues: Tactile cues provided;Verbal cues provided;Visual cues provided Activity Tolerance:  
poor Please refer to the flowsheet for vital signs taken during this treatment. After treatment patient left:  
Supine in bed COMMUNICATION/COLLABORATION:  
The patients plan of care was discussed with: Physical Therapist and Registered Nurse Yves Miles Time Calculation: 25 mins

## 2019-01-22 NOTE — PROGRESS NOTES
Problem: Mobility Impaired (Adult and Pediatric) Goal: *Acute Goals and Plan of Care (Insert Text) Physical Therapy Goals Initiated 1/17/2019 1. Patient will move from supine to sit and sit to supine  and roll side to side in bed with minimal assistance/contact guard assist within 7 day(s). 2.  Patient will transfer from bed to chair and chair to bed with moderate assistance  using the least restrictive device within 7 day(s). 3.  Patient will perform sit to stand with moderate assistance  within 7 day(s). 4.  Patient will ambulate with minimal assistance/contact guard assist for 25 feet with the least restrictive device within 7 day(s). physical Therapy TREATMENT Patient: Laura Tate (38 y.o. female) Date: 1/22/2019 Diagnosis: Generalized weakness Generalized weakness Precautions: Contact, Fall Chart, physical therapy assessment, plan of care and goals were reviewed. ASSESSMENT: 
The patient presents with maximum assist for bed mobility (rolling only) todayl assistance refusing to participate  In transfers or sitting EOB. The following are barriers to independence while in acute care:  
-Cognitive and/or behavioral: orientation and initiation with patient declining care at times 
-Medical condition: strength, sitting balance and medical history   
-Other:    
 
Prior level of function: not fully clear but appears to have been ambulatory and doing ADLS prior to onset of recent decline PLAN: 
Patient continues to benefit from skilled intervention to address the above impairments. Continue treatment per established plan of care. Recommendations and Planned Interventions: 
Continue to address bed mobility, balance and transfers Discharge recommendations: Rehab at skilled nursing facility (SNF): patient is working towards tolerating 3 hours of therapy (see barriers to discharging home), 24 hour skilled services and physical assist during all functional mobility Patient's barriers to discharging home, in addition to above impairments: total assist driving to follow up medical appointment(s)/groceries/obtain medication family availability for education/training to then follow up at home. Equipment recommendations for successful discharge (if) home: for rehab/SNF SUBJECTIVE:  
Patient stated No. OBJECTIVE DATA SUMMARY:  
Critical Behavior: 
Neurologic State: Lethargic(on arrival, alert with the stimulation of movement) Orientation Level: Disoriented to time, Disoriented to situation, Disoriented to place(responds to her first name) Cognition: Decreased attention/concentration, No command following, Impaired decision making Safety/Judgement: Decreased insight into deficits, Decreased awareness of environment Functional Mobility Training: 
Bed Mobility: 
Rolling: Maximum assistance;Assist x1 Pain: 
Pre treatment: 0 /10 Post treatment:  0/10  
00After treatment patient left:  
Supine in bed Bed in low position Call light within reach RN notified COMMUNICATION/COLLABORATION:  
The patients plan of care was discussed with: Certified Occupational Therapy Assistant, Registered Nurse and  Brianna Castle PT Time Calculation: 25 mins

## 2019-01-22 NOTE — PROGRESS NOTES
Speech pathology note Patient refused PO intake despite encouragement from SLP. Will continue to follow for diet tolerance. Thank you. Angie Gan., CCC-SLP

## 2019-01-23 VITALS
BODY MASS INDEX: 18.09 KG/M2 | SYSTOLIC BLOOD PRESSURE: 145 MMHG | DIASTOLIC BLOOD PRESSURE: 76 MMHG | HEART RATE: 52 BPM | RESPIRATION RATE: 18 BRPM | OXYGEN SATURATION: 97 % | TEMPERATURE: 97.7 F | WEIGHT: 108.69 LBS

## 2019-01-23 LAB — TSH SERPL DL<=0.05 MIU/L-ACNC: 2.44 UIU/ML (ref 0.36–3.74)

## 2019-01-23 PROCEDURE — 97530 THERAPEUTIC ACTIVITIES: CPT

## 2019-01-23 PROCEDURE — 74011250637 HC RX REV CODE- 250/637: Performed by: INTERNAL MEDICINE

## 2019-01-23 PROCEDURE — 36415 COLL VENOUS BLD VENIPUNCTURE: CPT

## 2019-01-23 PROCEDURE — 84443 ASSAY THYROID STIM HORMONE: CPT

## 2019-01-23 PROCEDURE — 74011250636 HC RX REV CODE- 250/636: Performed by: INTERNAL MEDICINE

## 2019-01-23 RX ORDER — FUROSEMIDE 40 MG/1
20 TABLET ORAL DAILY
Qty: 1 TAB | Refills: 0 | Status: SHIPPED
Start: 2019-01-23

## 2019-01-23 RX ADMIN — HYDRALAZINE HYDROCHLORIDE 25 MG: 25 TABLET, FILM COATED ORAL at 13:57

## 2019-01-23 RX ADMIN — HEPARIN SODIUM 5000 UNITS: 5000 INJECTION INTRAVENOUS; SUBCUTANEOUS at 10:09

## 2019-01-23 RX ADMIN — SODIUM CHLORIDE, SODIUM LACTATE, POTASSIUM CHLORIDE, AND CALCIUM CHLORIDE 75 ML/HR: 600; 310; 30; 20 INJECTION, SOLUTION INTRAVENOUS at 01:32

## 2019-01-23 RX ADMIN — Medication 10 ML: at 13:57

## 2019-01-23 RX ADMIN — Medication 10 ML: at 05:56

## 2019-01-23 RX ADMIN — HEPARIN SODIUM 5000 UNITS: 5000 INJECTION INTRAVENOUS; SUBCUTANEOUS at 01:28

## 2019-01-23 RX ADMIN — HYDRALAZINE HYDROCHLORIDE 25 MG: 25 TABLET, FILM COATED ORAL at 10:09

## 2019-01-23 RX ADMIN — Medication 1 CAPSULE: at 10:08

## 2019-01-23 RX ADMIN — ASPIRIN 325 MG: 325 TABLET ORAL at 10:08

## 2019-01-23 RX ADMIN — MEMANTINE 5 MG: 5 TABLET ORAL at 10:13

## 2019-01-23 NOTE — PROGRESS NOTES
Hematology Oncology Progress Note Follow up for: CLL Chart notes reviewed since last visit. Case discussed with following: PCP. Patient complains of the following: weakness Additional concerns noted by the staff:  
 
Patient Vitals for the past 24 hrs: 
 BP Temp Pulse Resp SpO2 Weight  
01/22/19 2000 180/83 98.5 °F (36.9 °C) (!) 52 19 97 %   
01/22/19 1509 151/84 98.2 °F (36.8 °C) (!) 50 18 97 %   
01/22/19 0843      49.3 kg (108 lb 11 oz) 01/22/19 0351 159/75 98.8 °F (37.1 °C) (!) 50 18 97 %   
01/21/19 2137 177/87 98.6 °F (37 °C) (!) 50 16 96 %  Review of Systems: 
Constitutional No fevers, chills, night sweats, excessive fatigue or weight loss. Allergic/Immunologic No recent allergic reactions Eyes No significant visual difficulties. No diplopia. Hematologic/Lymphatic No easy bruising or bleeding. The patient denies any tender or palpable lymph nodes Respiratory No dyspnea on exertion, orthopnea, chest pain, cough or hemoptysis. Cardiovascular No anginal chest pain, irregular heart beat, tachycardia, palpitations or orthopnea. Gastrointestinal No nausea, vomiting, diarrhea, constipation, cramping, dysphagia, reflux, heartburn, GI bleeding, or early satiety. No change in bowel habits. Genitourinary (M) No hematuria, dysuria, increased frequency, urgency, hesitancy or incontinence. Musculoskeletal No joint pain, swelling or redness. No decreased range of motion. Neurologic No headache, blurred vision, and no areas of focal weakness or numbness. Normal gait. No sensory problems. Psychiatric No insomnia, depression, coco or mood swings. No psychotropic drugs. Physical Examination: 
Constitutional Alert, cooperative, oriented. Mood and affect appropriate. Head Normocephalic; no scars Eyes Conjunctivae and sclerae are clear and without icterus. Pupils are reactive and equal.  
Neck Supple without masses or thyromegaly. No jugular venous distension. Hematologic/Lymphatic No petechiae or purpura. No tender or palpable lymph nodes in the cervical, supraclavicular, axillary or inguinal area. Respiratory Lungs are clear to auscultation without rhonchi or wheezing. Cardiovascular Regular rate and rhythm of heart without murmurs, gallops or rubs. Abdomen Non-tender, non-distended, no masses, ascites or hepatosplenomegaly. Good bowel sounds. No guarding or rebound tenderness. No pulsatile masses. Musculoskeletal No tenderness or swelling, normal range of motion without obvious weakness. Extremities No visible deformities, no cyanosis, clubbing or edema. Skin No rashes, scars, or lesions suggestive of malignancy. No petechiae, purpura, or ecchymoses. No excoriations. Neurologic No sensory or motor deficits, normal cerebellar function, normal gait, cranial nerves intact. Psychiatric Alert and oriented times three. Coherent speech. Verbalizes understanding of our discussions today. Labs: 
Recent Results (from the past 24 hour(s)) CBC WITH AUTOMATED DIFF Collection Time: 01/22/19  1:07 AM  
Result Value Ref Range .9 (HH) 3.6 - 11.0 K/uL  
 RBC 3.27 (L) 3.80 - 5.20 M/uL HGB 9.7 (L) 11.5 - 16.0 g/dL HCT 30.3 (L) 35.0 - 47.0 % MCV 92.7 80.0 - 99.0 FL  
 MCH 29.7 26.0 - 34.0 PG  
 MCHC 32.0 30.0 - 36.5 g/dL  
 RDW 15.7 (H) 11.5 - 14.5 % PLATELET 910 136 - 366 K/uL MPV 11.5 8.9 - 12.9 FL  
 NRBC 0.0 0  WBC ABSOLUTE NRBC 0.00 0.00 - 0.01 K/uL NEUTROPHILS 5 (L) 32 - 75 % LYMPHOCYTES 92 (H) 12 - 49 % MONOCYTES 3 (L) 5 - 13 % EOSINOPHILS 0 0 - 7 % BASOPHILS 0 0 - 1 % IMMATURE GRANULOCYTES 0 %  
 ABS. NEUTROPHILS 5.7 1.8 - 8.0 K/UL  
 ABS. LYMPHOCYTES 105.8 (H) 0.8 - 3.5 K/UL  
 ABS. MONOCYTES 3.4 (H) 0.0 - 1.0 K/UL  
 ABS. EOSINOPHILS 0.0 0.0 - 0.4 K/UL  
 ABS. BASOPHILS 0.0 0.0 - 0.1 K/UL  
 ABS. IMM. GRANS. 0.0 K/UL  
 DF MANUAL    
 RBC COMMENTS ANISOCYTOSIS 1+ 
    
 RBC COMMENTS OVALOCYTES 
 PRESENT 
    
 WBC COMMENTS Differential performed on albumin smear METABOLIC PANEL, BASIC Collection Time: 01/22/19  1:07 AM  
Result Value Ref Range Sodium 143 136 - 145 mmol/L Potassium 4.3 3.5 - 5.1 mmol/L Chloride 111 (H) 97 - 108 mmol/L  
 CO2 25 21 - 32 mmol/L Anion gap 7 5 - 15 mmol/L Glucose 74 65 - 100 mg/dL BUN 12 6 - 20 MG/DL Creatinine 0.80 0.55 - 1.02 MG/DL  
 BUN/Creatinine ratio 15 12 - 20 GFR est AA >60 >60 ml/min/1.73m2 GFR est non-AA >60 >60 ml/min/1.73m2 Calcium 7.8 (L) 8.5 - 10.1 MG/DL Imaging: 
 
Chest X-Ray: Normal. 
Images:  abnormal Infiltrate LLL Assessment: 
Have reviewed her clinical status and CLL status. This is still an early stage CLL. No intervention is recommended Plan:  
Continue to manage medical and placement issues

## 2019-01-23 NOTE — PROGRESS NOTES
1503: Report was called to [facility] and spoke with [facility] LPN. SBAR was given and no other questions.

## 2019-01-23 NOTE — CONSULTS
Cardiology Note dictated # [id number]  Impressions: The patient has underlying slow atrial flutter with 2:1 AV block. She is hemodynamically stable.   Recommend:  Discontinue amiodarone  Check thyroid function if not already done  Add back a lower dose of her metoprolol as needed if her HR accelerates   No need for permanent pacing or other cardiac interventions at this time  Will see prn at your request  Thank you for this referral  Christine Mejía MD

## 2019-01-23 NOTE — PROGRESS NOTES
Problem: Falls - Risk of 
Goal: *Absence of Falls Document Salina Fearing Fall Risk and appropriate interventions in the flowsheet. Outcome: Progressing Towards Goal 
Fall Risk Interventions: 
  
 
Mentation Interventions: Adequate sleep, hydration, pain control Medication Interventions: Evaluate medications/consider consulting pharmacy, Patient to call before getting OOB Elimination Interventions: Call light in reach Problem: Pressure Injury - Risk of 
Goal: *Prevention of pressure injury Document Geremias Scale and appropriate interventions in the flowsheet. Outcome: Progressing Towards Goal 
Pressure Injury Interventions: 
Sensory Interventions: Assess changes in LOC Moisture Interventions: Absorbent underpads Activity Interventions: Pressure redistribution bed/mattress(bed type) Mobility Interventions: HOB 30 degrees or less, Pressure redistribution bed/mattress (bed type) Nutrition Interventions: Document food/fluid/supplement intake Friction and Shear Interventions: Apply protective barrier, creams and emollients

## 2019-01-23 NOTE — PROGRESS NOTES
CM noted patient d/c today. Uriel Kilpatrick has obtained insurance auth. Ambulance transport requested for 3pm today. Family (granddaughter Jane Shultz) aware of d/c today. CM left a vmm on [facility]'s phone ([phone]) re 3:30p transport. Assigned nurse to call report to [phone]. Packet left at nurses' station.   Adam Bird, BSW, CRM

## 2019-01-23 NOTE — CONSULTS
[address]    Randal Mao  MR#: [id number]  : 10/20/1932  ACCOUNT #: [de-identified]   DATE OF SERVICE: 2019    REFERRING PHYSICIAN:  Dr. Susie Clay:  Consult was requested to assess this patient who was noted to have some bradycardia. HISTORY OF PRESENT ILLNESS:  The patient is well known to me. However, I have not seen her since 10/2017. She has essentially persistent atrial flutter. She has hypertension. She has chronic lymphocytic leukemia, hyperlipidemia, chronic kidney disease, and a hemorrhagic stroke in 2017. She has chronic diastolic heart failure. She was admitted with altered mental status and lethargy, looks like, on 2019. I am seeing her tonight. She is awake and alert and I believe she sort of remembers me. She is lying flat and looks comfortable. She denies any pain, dyspnea, orthopnea, dizziness, or syncope. She is currently on amiodarone amongst her medications. Her metoprolol succinate is being held. REVIEW OF SYSTEMS:  Otherwise negative. PHYSICAL EXAMINATION:  GENERAL:  This is well-developed, alert, thin, elderly woman in no distress. VITAL SIGNS:  As follows:  Heart rate of 52, temperature is normal, respirations 19, blood pressure 180/83, sats 97%. HEENT:  Unremarkable. NECK:  Supple. No adenopathy. CHEST WALL:  Healed sternotomy. LUNGS:  Clear anteriorly. HEART:  Regular moderate rhythm. No friction rub or S3 gallop. ABDOMEN:  Soft, nontender. No bruits. EXTREMITIES:  No edema. Normal pedal pulses. NEUROLOGIC:  The patient is awake, alert, and appropriate. No focal motor signs. LABORATORY DATA:  Her EKG demonstrates slow atrial flutter. This is a 2:1 AV block and an effective rate of 76 beats a minute. I do see slower heart rates and her vital signs down to 46. Her labs reflect her CLL with a white count of 114,900.   BUN and creatinine are normal.    IMPRESSION:  The patient has periods of moderate bradycardia. She has underlying fairly permanent atrial flutter, slow atrial rate, with 2:1 conduction. She is hemodynamically stable and asymptomatic. RECOMMENDATIONS:  1. Would withdraw the amiodarone. Even after withdrawal, she will have blood levels of this medication for several weeks. 2.  Would resume some of her metoprolol succinate as needed for heart rate control. 3.  With long-term amiodarone use, would check her thyroid function as well. 4.  Finally, there is no need for pacing or other cardiac interventions at this time. Thank you for this referral.  We will follow. We will sign off for now and see MD CRISELDA Amezcua / SOURAV  D: 01/22/2019 20:42     T: 01/22/2019 21:28  JOB #: [id number]

## 2019-01-23 NOTE — DISCHARGE INSTRUCTIONS
Discharge SNF/Rehab Instructions/LTAC       PATIENT ID: Katina Carrasquillo  MRN: [id number]   YOB: 1932    DATE OF ADMISSION: 1/16/2019 11:21 AM    DATE OF DISCHARGE: 1/23/2019    PRIMARY CARE PROVIDER: Rolando Gonzalez MD       ATTENDING PHYSICIAN: Roland Webb MD  DISCHARGING PROVIDER: Homar Ruiz MD     To contact this individual call [phone] and ask the  to page. If unavailable ask to be transferred the Adult Hospitalist Department. CONSULTATIONS: IP CONSULT TO ONCOLOGY  IP CONSULT TO CARDIOLOGY    PROCEDURES/SURGERIES: * No surgery found *    ADMITTING DIAGNOSES & HOSPITAL COURSE:   55-year-old woman with a past medical history significant for chronic kidney disease, chronic lymphocytic leukemia, hypertension, dyslipidemia, chronic atrial fibrillation, status post CVA, was in her usual state of health until about 5 days ago when the patient developed generalized weakness, which is progressive and getting worse.  The patient also complained of diarrhea, which is profuse.  No blood or mucus in the stool.  No sick contact.  As a result of the generalized weakness, the patient has not been able to carry out activities of daily living.      Bradycardia:  -patient has history of paroxysmal atrial flutter and on amiodarone 200 mg BID and toprol  mg   -EKG on admission showed atrial flutter with 2:1 conduction  -Her heart rate was in 50s,so amiodarone and metoprolol were stopped. -If her heart rate increases >90 then low dose metoprolol can be started.   -She needs follow up with ,her cardiologist in 1 week       Paroxysmal atrial fib:  -HR now in 50s as discussed above  -on aspirin 325 mg  Per chart review not on anticoagulation due to ? amyloid angiopathy -risk of bleeding     Bacterial pneumonia with LLL infiltrate on Chest CT  -suspected gram negative  -completed ceftriaxone and zithromax     Bacteriuria: difficult to tell if this is a source of infection.      Generalized weakness:  -CT head negative for acute findings  -PT/OT - SNF     Hyperkalemia  -improved     Chronic kidney disease stage II-III  -creatinine not reliable as she has low muscle mass  -stable, monitor.  -repeat basal metabolic panel in 3-5 days     Transaminitis  -sec to ? amiodarone  -LFTs trending down       Chronic lymphocytic leukemia, lost to follow-up.  -she has stage 1 CLL, no interventions now per oncology  -[facility], outpatient follow up at discharge with Maxine Rubio     Hypertension  -On Hydralazine.      Dementia, supportive therapy    PENDING TEST RESULTS:   At the time of discharge the following test results are still pending: none    FOLLOW UP APPOINTMENTS:    Follow-up Information     Follow up With Specialties Details Why Contact Info    Cl Quiles MD [facility]   [address]      Audra Alcocer MD Cardiology In 1 week  200 [facility]  109 [facility] 7 [address]      Rama Valdez MD Hematology and Oncology, Hematology, Oncology In 3 weeks  [address] Hematology  [facility] 13  [phone]             ADDITIONAL CARE RECOMMENDATIONS:   1)Make an appointment with Sachi Loya in 1 week -cardiologist  2)follow up with your PCP and hematologist after discharge from rehab    DIET: mechanical soft diet    ACTIVITY: Activity as tolerated    WOUND CARE: na    EQUIPMENT needed: na      DISCHARGE MEDICATIONS:   See Medication Reconciliation Form      NOTIFY YOUR PHYSICIAN FOR ANY OF THE FOLLOWING:   Fever over 101 degrees for 24 hours. Chest pain, shortness of breath, fever, chills, nausea, vomiting, diarrhea, change in mentation, falling, weakness, bleeding. Severe pain or pain not relieved by medications. Or, any other signs or symptoms that you may have questions about.     DISPOSITION:    Home With:   OT  PT  HH  RN      X SNF/Inpatient Rehab/LTAC Independent/assisted living    Hospice    Other:       PATIENT CONDITION AT DISCHARGE:     Functional status    Poor    X Deconditioned     Independent      Cognition     Lucid     Forgetful    X Dementia      Catheters/lines (plus indication)    Renteria     PICC     PEG    X None      Code status     Full code    X DNR      PHYSICAL EXAMINATION AT DISCHARGE:     Constitutional:  No acute distress, cooperative, pleasant    ENT:  Neck supple    Resp:  decreased sounds at bases. No accessory muscle use   CV:  Regular rhythm, decreased rate    GI:  Soft, non distended, non tender, normoactive bowel sounds    Musculoskeletal:  No edema, warm    Neurologic:  Moves all extremities.   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 ([facility] ICD-10-CM: A74.083  ICD-9-CM: 434.01  6/30/2017 - Present        CLL (chronic lymphocytic leukemia) ([facility] ICD-10-CM: C91.90  ICD-9-CM: 204.10  Unknown - Present        A-fib ([facility] ICD-10-CM: I48.91  ICD-9-CM: 427.31  10/31/2014 - Present        CHF (congestive heart failure) ([facility] ICD-10-CM: I50.9  ICD-9-CM: 428.0  10/24/2014 - Present        Lower GI bleed ICD-10-CM: K92.2  ICD-9-CM: 578.9  7/18/2014 - Present        GIB (gastrointestinal bleeding) ICD-10-CM: K92.2  ICD-9-CM: 578.9  7/18/2014 - Present        Calf pain ICD-10-CM: M79.669  ICD-9-CM: 729.5  6/23/2010 - Present        HTN (hypertension) ICD-10-CM: I10  ICD-9-CM: 401.9  1/13/2010 - Present        History of transient ischemic attack (TIA) ICD-10-CM: Z86.73  ICD-9-CM: V12.54  1/13/2010 - Present        Allergic rhinitis ICD-10-CM: J30.9  ICD-9-CM: 477.9  1/13/2010 - Present        Mixed hyperlipidemia ICD-10-CM: E78.2  ICD-9-CM: 272.2  1/13/2010 - Present Smoker ICD-10-CM: F17.200  ICD-9-CM: 305.1  1/13/2010 - Present        RESOLVED: Hematochezia ICD-10-CM: K92.1  ICD-9-CM: 578.1  10/31/2014 - 11/5/2014        RESOLVED: ARF (acute renal failure) ([facility] ICD-10-CM: N17.9  ICD-9-CM: 584.9  10/31/2014 - 11/5/2014        RESOLVED: Hypokalemia ICD-10-CM: E87.6  ICD-9-CM: 276.8  10/31/2014 - 11/5/2014                    Signed:   Kayla Bird MD  1/23/2019  11:07 AM

## 2019-01-23 NOTE — PROGRESS NOTES
Problem: Mobility Impaired (Adult and Pediatric) Goal: *Acute Goals and Plan of Care (Insert Text) Physical Therapy Goals Initiated 1/17/2019 1. Patient will move from supine to sit and sit to supine  and roll side to side in bed with minimal assistance/contact guard assist within 7 day(s). 2.  Patient will transfer from bed to chair and chair to bed with moderate assistance  using the least restrictive device within 7 day(s). 3.  Patient will perform sit to stand with moderate assistance  within 7 day(s). 4.  Patient will ambulate with minimal assistance/contact guard assist for 25 feet with the least restrictive device within 7 day(s). physical Therapy TREATMENT Patient: Hallie Pedroza (50 y.o. female) Date: 1/23/2019 Diagnosis: Generalized weakness Generalized weakness Precautions: Contact, Fall Chart, physical therapy assessment, plan of care and goals were reviewed. ASSESSMENT: 
The patient presents with Maximum assistance and Assist x2 functional transfers, The following are barriers to independence while in acute care:  
-Cognitive and/or behavioral: orientation, processing and insight into deficits 
-Medical condition: strength, sitting balance, standing balance and hypertension   
-Other:    
 
Prior level of function: unclear but have noted ambulatory with RW and able to do ADLS. PLAN: 
Patient continues to benefit from skilled intervention to address the above impairments. Continue treatment per established plan of care. Recommendations and Planned Interventions: 
Bed mobility, transfers, gait training, the. Ex. Discharge recommendations: Rehab at skilled nursing facility (SNF): patient is working towards tolerating 3 hours of therapy (see barriers to discharging home) Patient's barriers to discharging home, in addition to above impairments: family availability to assist family availability for education/training to then follow up at home. Equipment recommendations for successful discharge (if) home: for SNF SUBJECTIVE:  
Patient stated My mouth is dry.  OBJECTIVE DATA SUMMARY:  
Critical Behavior: 
Neurologic State: Other (Comment)(Pt non-verbal answered questions by nodding head) Orientation Level: Other (Comment)(Pt only nodded to answer questions) Cognition: Decreased attention/concentration, Unable to assess (comment)(Pt non-verbal) Safety/Judgement: Decreased insight into deficits, Decreased awareness of environment Functional Mobility Training: 
Bed Mobility: 
  
Supine to Sit: Maximum assistance;Assist x1 Scooting: Total assistance Transfers: 
Sit to Stand: Maximum assistance;Assist x1 Stand to Sit: Maximum assistance Stand Pivot Transfers: Assist x2 Bed to Chair: Maximum assistance Balance: 
Sitting: Impaired; Without support Sitting - Static: Fair (occasional) Sitting - Dynamic: Fair (occasional) Standing: Impaired; With support Standing - Static: Poor Standing - Dynamic : PoorAmbulation/Gait Training: 
 non functional at this time (max x 2  To transfer with RW) After treatment patient left:  
Up in chair Caregiver at bedside COMMUNICATION/COLLABORATION:  
The patients plan of care was discussed with: Registered Nurse and  Winston Burton, PT Time Calculation: 21 mins

## 2019-01-23 NOTE — PROGRESS NOTES
Problem: Falls - Risk of 
Goal: *Absence of Falls Document Shamika Mcrae Fall Risk and appropriate interventions in the flowsheet. Outcome: Progressing Towards Goal 
Fall Risk Interventions: 
  
 
Mentation Interventions: Adequate sleep, hydration, pain control, Door open when patient unattended, More frequent rounding, Reorient patient, Room close to nurse's station Medication Interventions: Evaluate medications/consider consulting pharmacy, Patient to call before getting OOB Elimination Interventions: Call light in reach, Patient to call for help with toileting needs, Toileting schedule/hourly rounds Problem: Pressure Injury - Risk of 
Goal: *Prevention of pressure injury Document Geremias Scale and appropriate interventions in the flowsheet. Outcome: Progressing Towards Goal 
Pressure Injury Interventions: 
Sensory Interventions: Assess changes in LOC, Assess need for specialty bed Moisture Interventions: Absorbent underpads Activity Interventions: Pressure redistribution bed/mattress(bed type) Mobility Interventions: HOB 30 degrees or less, Pressure redistribution bed/mattress (bed type) Nutrition Interventions: Document food/fluid/supplement intake, Offer support with meals,snacks and hydration Friction and Shear Interventions: Apply protective barrier, creams and emollients, Lift sheet, Minimize layers

## 2019-01-24 NOTE — DISCHARGE SUMMARY
Discharge Summary       PATIENT ID: Thalia Lew  MRN: [id number]   YOB: 1932    DATE OF ADMISSION: 1/16/2019 11:21 AM    DATE OF DISCHARGE: 1/23/2019   PRIMARY CARE PROVIDER: Erlin Gaxiola MD     ATTENDING PHYSICIAN: Niraj Conley MD    DISCHARGING PROVIDER: Niraj Conley MD    To contact this individual call [phone] and ask the  to page. If unavailable ask to be transferred the Adult Hospitalist Department. CONSULTATIONS: IP CONSULT TO CARDIOLOGY    PROCEDURES/SURGERIES: * No surgery found *    ADMITTING DIAGNOSES & HOSPITAL COURSE:   51-year-old woman with a past medical history significant for chronic kidney disease, chronic lymphocytic leukemia, hypertension, dyslipidemia, chronic atrial fibrillation, status post CVA, was in her usual state of health until about 5 days ago when the patient developed generalized weakness, which is progressive and getting worse.  The patient also complained of diarrhea, which is profuse.  No blood or mucus in the stool.  No sick contact.  As a result of the generalized weakness, the patient has not been able to carry out activities of daily living.      Bradycardia:  -patient has history of paroxysmal atrial flutter and on amiodarone 200 mg BID and toprol  mg   -EKG on admission showed atrial flutter with 2:1 conduction  -Her heart rate was in 50s,so amiodarone and metoprolol were stopped. -If her heart rate increases >90 then low dose metoprolol can be started.   -She needs follow up with ,her cardiologist in 1 week       Paroxysmal atrial fib:  -HR now in 50s as discussed above  -on aspirin 325 mg  Per chart review not on anticoagulation due to ? amyloid angiopathy -risk of bleeding     Bacterial pneumonia with LLL infiltrate on Chest CT  -suspected gram negative  -completed ceftriaxone and zithromax     Bacteriuria: difficult to tell if this is a source of infection.      Generalized weakness:  -CT head negative for acute findings  -PT/OT - SNF     Hyperkalemia  -improved     Chronic kidney disease stage II-III  -creatinine not reliable as she has low muscle mass  -stable, monitor.  -repeat basal metabolic panel in 3-5 days     Transaminitis  -sec to ? amiodarone  -LFTs trending down       Chronic lymphocytic leukemia, lost to follow-up.  -she has stage 1 CLL, no interventions now per oncology  -[facility], outpatient follow up at discharge with Rhianna Goodrich     Hypertension  -On Hydralazine.      Dementia, supportive therapy                PENDING TEST RESULTS:   At the time of discharge the following test results are still pending: none          FOLLOW UP APPOINTMENTS:    Follow-up Information     Follow up With Specialties Details Why Contact Info    Clifton Chavez MD [facility]   713 [facility] 1800 [facility]      Ariana Galeas MD Cardiology In 1 week  200 [facility]  109 [facility]  [facility] 7 [address]      Azucena Guillen MD Hematology and Oncology, Hematology, Oncology In 3 weeks  235 [facility] Hematology  1400 [facility]  [phone]      [address] [place] [address]  1007 [facility]  [phone]               ADDITIONAL CARE RECOMMENDATIONS:   1)Make an appointment with Amarilis Aceves in 1 week -cardiologist  2)follow up with your PCP and hematologist after discharge from rehab    DIET: mechanical soft diet    ACTIVITY: Activity as tolerated    WOUND CARE: na    EQUIPMENT needed: na          DISCHARGE MEDICATIONS:  Discharge Medication List as of 1/23/2019  2:37 PM      CONTINUE these medications which have CHANGED    Details   furosemide (LASIX) 40 mg tablet Take 0.5 Tabs by mouth daily. , No Print, Disp-1 Tab, R-0         CONTINUE these medications which have NOT CHANGED    Details   atorvastatin (LIPITOR) 40 mg tablet Take 40 mg by mouth nightly., Historical Med      aspirin (ASPIRIN) 325 mg tablet Take 325 mg by mouth daily. , Historical Med      hydrALAZINE (APRESOLINE) 25 mg tablet Take 25 mg by mouth three (3) times daily (with meals). , Historical Med      memantine (NAMENDA) 5 mg tablet Take 5 mg by mouth daily. , Historical Med         STOP taking these medications       metoprolol succinate (TOPROL-XL) 100 mg tablet Comments:   Reason for Stopping:         potassium chloride SR (KLOR-CON 10) 10 mEq tablet Comments:   Reason for Stopping:         amiodarone (CORDARONE) 200 mg tablet Comments:   Reason for Stopping:         METOPROLOL SUCCINATE PO Comments:   Reason for Stopping:         potassium chloride SA (MICRO-K) 10 mEq capsule Comments:   Reason for Stopping:                 NOTIFY YOUR PHYSICIAN FOR ANY OF THE FOLLOWING:   Fever over 101 degrees for 24 hours. Chest pain, shortness of breath, fever, chills, nausea, vomiting, diarrhea, change in mentation, falling, weakness, bleeding. Severe pain or pain not relieved by medications. Or, any other signs or symptoms that you may have questions about. DISPOSITION:    Home With:   OT  PT  HH  RN      X Long term SNF/Inpatient Rehab    Independent/assisted living    Hospice    Other:       PATIENT CONDITION AT DISCHARGE:     Functional status    Poor    X Deconditioned     Independent      Cognition     Lucid     Forgetful    X Dementia      Catheters/lines (plus indication)    Renteria     PICC     PEG    X None      Code status     Full code    X DNR      PHYSICAL EXAMINATION AT DISCHARGE:     Constitutional:  No acute distress, cooperative, pleasant    ENT:  Neck supple    Resp:  decreased sounds at bases. No accessory muscle use   CV:  Regular rhythm, decreased rate    GI:  Soft, non distended, non tender, normoactive bowel sounds    Musculoskeletal:  No edema, warm    Neurologic:  Moves all extremities.   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 ([facility] ICD-10-CM: Y19.505  ICD-9-CM: 434.01  6/30/2017 - Present        CLL (chronic lymphocytic leukemia) ([facility] ICD-10-CM: C91.90  ICD-9-CM: 204.10  Unknown - Present        A-fib ([place] [address].) ICD-10-CM: I48.91  ICD-9-CM: 427.31  10/31/2014 - Present        CHF (congestive heart failure) ([facility] ICD-10-CM: I50.9  ICD-9-CM: 428.0  10/24/2014 - Present        Lower GI bleed ICD-10-CM: K92.2  ICD-9-CM: 578.9  7/18/2014 - Present        GIB (gastrointestinal bleeding) ICD-10-CM: K92.2  ICD-9-CM: 578.9  7/18/2014 - Present        Calf pain ICD-10-CM: M79.669  ICD-9-CM: 729.5  6/23/2010 - Present        HTN (hypertension) ICD-10-CM: I10  ICD-9-CM: 401.9  1/13/2010 - Present        History of transient ischemic attack (TIA) ICD-10-CM: Z86.73  ICD-9-CM: V12.54  1/13/2010 - Present        Allergic rhinitis ICD-10-CM: J30.9  ICD-9-CM: 477.9  1/13/2010 - Present        Mixed hyperlipidemia ICD-10-CM: E78.2  ICD-9-CM: 272.2  1/13/2010 - Present        Smoker ICD-10-CM: F17.200  ICD-9-CM: 305.1  1/13/2010 - Present        RESOLVED: Hematochezia ICD-10-CM: K92.1  ICD-9-CM: 578.1  10/31/2014 - 11/5/2014        RESOLVED: ARF (acute renal failure) ([facility] ICD-10-CM: N17.9  ICD-9-CM: 584.9  10/31/2014 - 11/5/2014        RESOLVED: Hypokalemia ICD-10-CM: E87.6  ICD-9-CM: 276.8  10/31/2014 - 11/5/2014              Greater than 35 minutes were spent with the patient on counseling and coordination of care    Signed:   Krystyna Lopez MD  1/23/2019  11:06 PM